# Patient Record
Sex: MALE | Race: WHITE | Employment: OTHER | URBAN - NONMETROPOLITAN AREA
[De-identification: names, ages, dates, MRNs, and addresses within clinical notes are randomized per-mention and may not be internally consistent; named-entity substitution may affect disease eponyms.]

---

## 2017-10-31 LAB
ALBUMIN SERPL-MCNC: 4.2 G/DL
ALP BLD-CCNC: 131 U/L
ALT SERPL-CCNC: 26 U/L
ANION GAP SERPL CALCULATED.3IONS-SCNC: NORMAL MMOL/L
AST SERPL-CCNC: 19 U/L
AVERAGE GLUCOSE: NORMAL
BASOPHILS ABSOLUTE: NORMAL
BASOPHILS RELATIVE PERCENT: NORMAL
BILIRUB SERPL-MCNC: 0.5 MG/DL (ref 0.1–1.4)
BUN BLDV-MCNC: 28 MG/DL
CALCIUM SERPL-MCNC: 9.9 MG/DL
CHLORIDE BLD-SCNC: 94 MMOL/L
CHOLESTEROL, TOTAL: 348 MG/DL
CHOLESTEROL/HDL RATIO: ABNORMAL
CO2: 19 MMOL/L
CREAT SERPL-MCNC: 1.8 MG/DL
CREATININE, URINE: 51.7
EOSINOPHILS ABSOLUTE: NORMAL
EOSINOPHILS RELATIVE PERCENT: NORMAL
GFR CALCULATED: 40
GLUCOSE BLD-MCNC: 475 MG/DL
HBA1C MFR BLD: 10.8 %
HCT VFR BLD CALC: 42.5 % (ref 41–53)
HDLC SERPL-MCNC: 43 MG/DL (ref 35–70)
HEMOGLOBIN: 14.4 G/DL (ref 13.5–17.5)
LDL CHOLESTEROL CALCULATED: 209 MG/DL (ref 0–160)
LYMPHOCYTES ABSOLUTE: NORMAL
LYMPHOCYTES RELATIVE PERCENT: NORMAL
MCH RBC QN AUTO: NORMAL PG
MCHC RBC AUTO-ENTMCNC: NORMAL G/DL
MCV RBC AUTO: NORMAL FL
MICROALBUMIN/CREAT 24H UR: 53.7 MG/G{CREAT}
MICROALBUMIN/CREAT UR-RTO: NORMAL
MONOCYTES ABSOLUTE: NORMAL
MONOCYTES RELATIVE PERCENT: NORMAL
NEUTROPHILS ABSOLUTE: NORMAL
NEUTROPHILS RELATIVE PERCENT: NORMAL
NONHDLC SERPL-MCNC: ABNORMAL MG/DL
PLATELET # BLD: 145 K/ΜL
PMV BLD AUTO: NORMAL FL
POTASSIUM SERPL-SCNC: 5.1 MMOL/L
PROSTATE SPECIFIC ANTIGEN: 0.13 NG/ML
RBC # BLD: 4.43 10^6/ΜL
SODIUM BLD-SCNC: 134 MMOL/L
TOTAL PROTEIN: 7.8
TRIGL SERPL-MCNC: 484 MG/DL
VLDLC SERPL CALC-MCNC: 97 MG/DL
WBC # BLD: 4.5 10^3/ML

## 2018-06-05 LAB
ALBUMIN SERPL-MCNC: 4.5 G/DL
ALP BLD-CCNC: 129 U/L
ALT SERPL-CCNC: 23 U/L
ANION GAP SERPL CALCULATED.3IONS-SCNC: NORMAL MMOL/L
AST SERPL-CCNC: 23 U/L
AVERAGE GLUCOSE: NORMAL
BILIRUB SERPL-MCNC: 0.3 MG/DL (ref 0.1–1.4)
BUN BLDV-MCNC: 24 MG/DL
CALCIUM SERPL-MCNC: 9.5 MG/DL
CHLORIDE BLD-SCNC: 96 MMOL/L
CHOLESTEROL, TOTAL: 324 MG/DL
CHOLESTEROL/HDL RATIO: ABNORMAL
CO2: 24 MMOL/L
CREAT SERPL-MCNC: 1.6 MG/DL
CREATININE, URINE: 93.7
GFR CALCULATED: 46
GLUCOSE BLD-MCNC: 419 MG/DL
HBA1C MFR BLD: 11.9 %
HDLC SERPL-MCNC: 60 MG/DL (ref 35–70)
LDL CHOLESTEROL CALCULATED: 214 MG/DL (ref 0–160)
MICROALBUMIN/CREAT 24H UR: 129 MG/G{CREAT}
MICROALBUMIN/CREAT UR-RTO: NORMAL
NONHDLC SERPL-MCNC: ABNORMAL MG/DL
POTASSIUM SERPL-SCNC: 4.5 MMOL/L
SODIUM BLD-SCNC: 132 MMOL/L
TOTAL PROTEIN: 7.7
TRIGL SERPL-MCNC: 253 MG/DL
VLDLC SERPL CALC-MCNC: 51 MG/DL

## 2019-01-25 LAB
BASOPHILS ABSOLUTE: ABNORMAL
BASOPHILS RELATIVE PERCENT: ABNORMAL
BUN BLDV-MCNC: 16 MG/DL
CALCIUM SERPL-MCNC: 8.9 MG/DL
CHLORIDE BLD-SCNC: 104 MMOL/L
CO2: 27 MMOL/L
CREAT SERPL-MCNC: 1.2 MG/DL
EOSINOPHILS ABSOLUTE: ABNORMAL
EOSINOPHILS RELATIVE PERCENT: ABNORMAL
GFR CALCULATED: 64
GLUCOSE BLD-MCNC: 202 MG/DL
HCT VFR BLD CALC: 37.8 % (ref 41–53)
HEMOGLOBIN: 12.5 G/DL (ref 13.5–17.5)
LYMPHOCYTES ABSOLUTE: ABNORMAL
LYMPHOCYTES RELATIVE PERCENT: ABNORMAL
MCH RBC QN AUTO: ABNORMAL PG
MCHC RBC AUTO-ENTMCNC: ABNORMAL G/DL
MCV RBC AUTO: ABNORMAL FL
MONOCYTES ABSOLUTE: ABNORMAL
MONOCYTES RELATIVE PERCENT: ABNORMAL
NEUTROPHILS ABSOLUTE: ABNORMAL
NEUTROPHILS RELATIVE PERCENT: ABNORMAL
PLATELET # BLD: 155 K/ΜL
PMV BLD AUTO: ABNORMAL FL
POTASSIUM SERPL-SCNC: 4.9 MMOL/L
RBC # BLD: 3.83 10^6/ΜL
SODIUM BLD-SCNC: 138 MMOL/L
WBC # BLD: 3.84 10^3/ML

## 2019-03-04 LAB
ALBUMIN SERPL-MCNC: 4.6 G/DL
ALP BLD-CCNC: 155 U/L
ALT SERPL-CCNC: 25 U/L
ANION GAP SERPL CALCULATED.3IONS-SCNC: NORMAL MMOL/L
AST SERPL-CCNC: 15 U/L
AVERAGE GLUCOSE: NORMAL
BILIRUB SERPL-MCNC: 0.8 MG/DL (ref 0.1–1.4)
BUN BLDV-MCNC: 32 MG/DL
CALCIUM SERPL-MCNC: 10.3 MG/DL
CHLORIDE BLD-SCNC: 88 MMOL/L
CHOLESTEROL, TOTAL: 345 MG/DL
CHOLESTEROL/HDL RATIO: ABNORMAL
CO2: 23 MMOL/L
CREAT SERPL-MCNC: 1.5 MG/DL
FOLATE: 8.5
GFR CALCULATED: 50
GLUCOSE BLD-MCNC: 605 MG/DL
HBA1C MFR BLD: 12.1 %
HDLC SERPL-MCNC: 50 MG/DL (ref 35–70)
LDL CHOLESTEROL CALCULATED: 198 MG/DL (ref 0–160)
NONHDLC SERPL-MCNC: ABNORMAL MG/DL
POTASSIUM SERPL-SCNC: 4.5 MMOL/L
SODIUM BLD-SCNC: 127 MMOL/L
TOTAL PROTEIN: 8.6
TRIGL SERPL-MCNC: 485 MG/DL
VITAMIN B-12: 892
VLDLC SERPL CALC-MCNC: 97 MG/DL

## 2019-06-11 LAB
ALBUMIN SERPL-MCNC: 4.2 G/DL
ALP BLD-CCNC: 149 U/L
ALT SERPL-CCNC: 29 U/L
ANION GAP SERPL CALCULATED.3IONS-SCNC: NORMAL MMOL/L
AST SERPL-CCNC: 16 U/L
AVERAGE GLUCOSE: NORMAL
BASOPHILS ABSOLUTE: ABNORMAL
BASOPHILS RELATIVE PERCENT: ABNORMAL
BILIRUB SERPL-MCNC: 0.4 MG/DL (ref 0.1–1.4)
BUN BLDV-MCNC: 32 MG/DL
CALCIUM SERPL-MCNC: 10.5 MG/DL
CHLORIDE BLD-SCNC: 91 MMOL/L
CHOLESTEROL, TOTAL: 194 MG/DL
CHOLESTEROL/HDL RATIO: NORMAL
CO2: 26 MMOL/L
CREAT SERPL-MCNC: 1.3 MG/DL
CREATININE, URINE: 109.4
EOSINOPHILS ABSOLUTE: ABNORMAL
EOSINOPHILS RELATIVE PERCENT: ABNORMAL
GFR CALCULATED: 59
GLUCOSE BLD-MCNC: 375 MG/DL
HBA1C MFR BLD: 12.3 %
HCT VFR BLD CALC: 39.1 % (ref 41–53)
HDLC SERPL-MCNC: 58 MG/DL (ref 35–70)
HEMOGLOBIN: 13 G/DL (ref 13.5–17.5)
LDL CHOLESTEROL CALCULATED: 99 MG/DL (ref 0–160)
LYMPHOCYTES ABSOLUTE: ABNORMAL
LYMPHOCYTES RELATIVE PERCENT: ABNORMAL
MCH RBC QN AUTO: ABNORMAL PG
MCHC RBC AUTO-ENTMCNC: ABNORMAL G/DL
MCV RBC AUTO: ABNORMAL FL
MICROALBUMIN/CREAT 24H UR: 172.7 MG/G{CREAT}
MICROALBUMIN/CREAT UR-RTO: NORMAL
MONOCYTES ABSOLUTE: ABNORMAL
MONOCYTES RELATIVE PERCENT: ABNORMAL
NEUTROPHILS ABSOLUTE: ABNORMAL
NEUTROPHILS RELATIVE PERCENT: ABNORMAL
NONHDLC SERPL-MCNC: NORMAL MG/DL
PLATELET # BLD: 150 K/ΜL
PMV BLD AUTO: ABNORMAL FL
POTASSIUM SERPL-SCNC: 5 MMOL/L
RBC # BLD: 4.05 10^6/ΜL
SODIUM BLD-SCNC: 128 MMOL/L
TOTAL PROTEIN: 7.8
TRIGL SERPL-MCNC: 186 MG/DL
VLDLC SERPL CALC-MCNC: 37 MG/DL
WBC # BLD: 4.6 10^3/ML

## 2019-07-05 LAB
BUN BLDV-MCNC: 22 MG/DL
CALCIUM SERPL-MCNC: 10.1 MG/DL
CHLORIDE BLD-SCNC: 93 MMOL/L
CO2: 21 MMOL/L
CREAT SERPL-MCNC: 1.4 MG/DL
GFR CALCULATED: 54
GLUCOSE BLD-MCNC: 520 MG/DL
POTASSIUM SERPL-SCNC: 5.3 MMOL/L
SODIUM BLD-SCNC: 127 MMOL/L

## 2019-07-09 LAB
BUN BLDV-MCNC: 30 MG/DL
CALCIUM SERPL-MCNC: 9.7 MG/DL
CHLORIDE BLD-SCNC: 91 MMOL/L
CO2: 19 MMOL/L
CREAT SERPL-MCNC: 1.4 MG/DL
GFR CALCULATED: 54
GLUCOSE BLD-MCNC: 518 MG/DL
POTASSIUM SERPL-SCNC: 4.8 MMOL/L
SODIUM BLD-SCNC: 127 MMOL/L
TSH SERPL DL<=0.05 MIU/L-ACNC: 3.85 UIU/ML

## 2019-09-09 LAB
ALBUMIN SERPL-MCNC: 4.2 G/DL
ALP BLD-CCNC: 147 U/L
ALT SERPL-CCNC: 22 U/L
ANION GAP SERPL CALCULATED.3IONS-SCNC: NORMAL MMOL/L
AST SERPL-CCNC: 21 U/L
AVERAGE GLUCOSE: NORMAL
BASOPHILS ABSOLUTE: ABNORMAL
BASOPHILS RELATIVE PERCENT: ABNORMAL
BILIRUB SERPL-MCNC: 0.3 MG/DL (ref 0.1–1.4)
BUN BLDV-MCNC: 27 MG/DL
CALCIUM SERPL-MCNC: 10.1 MG/DL
CHLORIDE BLD-SCNC: 99 MMOL/L
CHOLESTEROL, TOTAL: 185 MG/DL
CHOLESTEROL/HDL RATIO: NORMAL
CO2: 23 MMOL/L
CREAT SERPL-MCNC: 1.4 MG/DL
CREATININE, URINE: 54.9
EOSINOPHILS ABSOLUTE: ABNORMAL
EOSINOPHILS RELATIVE PERCENT: ABNORMAL
GFR CALCULATED: 54
GLUCOSE BLD-MCNC: 367 MG/DL
HBA1C MFR BLD: 11.7 %
HCT VFR BLD CALC: 39.4 % (ref 41–53)
HDLC SERPL-MCNC: 55 MG/DL (ref 35–70)
HEMOGLOBIN: 13.3 G/DL (ref 13.5–17.5)
LDL CHOLESTEROL CALCULATED: 103 MG/DL (ref 0–160)
LYMPHOCYTES ABSOLUTE: ABNORMAL
LYMPHOCYTES RELATIVE PERCENT: ABNORMAL
MCH RBC QN AUTO: ABNORMAL PG
MCHC RBC AUTO-ENTMCNC: ABNORMAL G/DL
MCV RBC AUTO: ABNORMAL FL
MICROALBUMIN/CREAT 24H UR: 130.3 MG/G{CREAT}
MICROALBUMIN/CREAT UR-RTO: NORMAL
MONOCYTES ABSOLUTE: ABNORMAL
MONOCYTES RELATIVE PERCENT: ABNORMAL
NEUTROPHILS ABSOLUTE: ABNORMAL
NEUTROPHILS RELATIVE PERCENT: ABNORMAL
NONHDLC SERPL-MCNC: NORMAL MG/DL
PLATELET # BLD: 175 K/ΜL
PMV BLD AUTO: ABNORMAL FL
POTASSIUM SERPL-SCNC: 4.6 MMOL/L
RBC # BLD: 4.09 10^6/ΜL
SODIUM BLD-SCNC: 136 MMOL/L
TOTAL PROTEIN: 7.9
TRIGL SERPL-MCNC: 135 MG/DL
VLDLC SERPL CALC-MCNC: 27 MG/DL
WBC # BLD: 3.81 10^3/ML

## 2019-12-11 LAB
ALBUMIN SERPL-MCNC: 3.9 G/DL
ALP BLD-CCNC: 93 U/L
ALT SERPL-CCNC: 46 U/L
ANION GAP SERPL CALCULATED.3IONS-SCNC: NORMAL MMOL/L
AST SERPL-CCNC: 28 U/L
AVERAGE GLUCOSE: NORMAL
BILIRUB SERPL-MCNC: 0.4 MG/DL (ref 0.1–1.4)
BUN BLDV-MCNC: 15 MG/DL
CALCIUM SERPL-MCNC: 9.5 MG/DL
CHLORIDE BLD-SCNC: 100 MMOL/L
CO2: 25 MMOL/L
CREAT SERPL-MCNC: 1.1 MG/DL
CREATININE, URINE: 207.4
GFR CALCULATED: 71
GLUCOSE BLD-MCNC: 178 MG/DL
HBA1C MFR BLD: 11.8 %
MICROALBUMIN/CREAT 24H UR: 733.8 MG/G{CREAT}
MICROALBUMIN/CREAT UR-RTO: NORMAL
POTASSIUM SERPL-SCNC: 4.3 MMOL/L
SODIUM BLD-SCNC: 137 MMOL/L
TOTAL PROTEIN: 7.2

## 2020-03-12 LAB
ALBUMIN SERPL-MCNC: 3.9 G/DL
ALP BLD-CCNC: 151 U/L
ALT SERPL-CCNC: 23 U/L
ANION GAP SERPL CALCULATED.3IONS-SCNC: NORMAL MMOL/L
AST SERPL-CCNC: 14 U/L
AVERAGE GLUCOSE: NORMAL
BILIRUB SERPL-MCNC: 0.2 MG/DL (ref 0.1–1.4)
BUN BLDV-MCNC: 27 MG/DL
CALCIUM SERPL-MCNC: 9.5 MG/DL
CHLORIDE BLD-SCNC: 101 MMOL/L
CHOLESTEROL, TOTAL: 282 MG/DL
CHOLESTEROL/HDL RATIO: NORMAL
CO2: 25 MMOL/L
CREAT SERPL-MCNC: 1.3 MG/DL
GFR CALCULATED: 58
GLUCOSE BLD-MCNC: 282 MG/DL
HBA1C MFR BLD: 14 %
HDLC SERPL-MCNC: 47 MG/DL (ref 35–70)
LDL CHOLESTEROL CALCULATED: 142 MG/DL (ref 0–160)
NONHDLC SERPL-MCNC: NORMAL MG/DL
POTASSIUM SERPL-SCNC: 4.4 MMOL/L
SODIUM BLD-SCNC: 136 MMOL/L
TOTAL PROTEIN: 7
TRIGL SERPL-MCNC: 466 MG/DL
VLDLC SERPL CALC-MCNC: 93 MG/DL

## 2020-06-08 LAB
ALBUMIN SERPL-MCNC: 4.2 G/DL
ALP BLD-CCNC: 111 U/L
ALT SERPL-CCNC: 26 U/L
ANION GAP SERPL CALCULATED.3IONS-SCNC: NORMAL MMOL/L
AST SERPL-CCNC: 24 U/L
AVERAGE GLUCOSE: NORMAL
BILIRUB SERPL-MCNC: 0.3 MG/DL (ref 0.1–1.4)
BUN BLDV-MCNC: 28 MG/DL
CALCIUM SERPL-MCNC: 10 MG/DL
CHLORIDE BLD-SCNC: 96 MMOL/L
CO2: 26 MMOL/L
CREAT SERPL-MCNC: 1.8 MG/DL
GFR CALCULATED: 40
GLUCOSE BLD-MCNC: 236 MG/DL
HBA1C MFR BLD: 12.3 %
POTASSIUM SERPL-SCNC: 4.4 MMOL/L
SODIUM BLD-SCNC: 134 MMOL/L
TOTAL PROTEIN: 7.4

## 2020-08-18 LAB
ALBUMIN SERPL-MCNC: 4 G/DL
ALP BLD-CCNC: 120 U/L
ALT SERPL-CCNC: 18 U/L
ANION GAP SERPL CALCULATED.3IONS-SCNC: NORMAL MMOL/L
AST SERPL-CCNC: 13 U/L
BASOPHILS ABSOLUTE: ABNORMAL
BASOPHILS RELATIVE PERCENT: ABNORMAL
BILIRUB SERPL-MCNC: 0.5 MG/DL (ref 0.1–1.4)
BILIRUBIN, URINE: POSITIVE
BLOOD, URINE: POSITIVE
BUN BLDV-MCNC: 41 MG/DL
CALCIUM SERPL-MCNC: 9.6 MG/DL
CHLORIDE BLD-SCNC: 91 MMOL/L
CLARITY: CLEAR
CO2: 14 MMOL/L
COLOR: YELLOW
CREAT SERPL-MCNC: 1.9 MG/DL
EOSINOPHILS ABSOLUTE: ABNORMAL
EOSINOPHILS RELATIVE PERCENT: ABNORMAL
GFR CALCULATED: 38
GLUCOSE BLD-MCNC: 496 MG/DL
GLUCOSE URINE: >=1000
HCT VFR BLD CALC: 37.5 % (ref 41–53)
HEMOGLOBIN: 12.3 G/DL (ref 13.5–17.5)
KETONES, URINE: POSITIVE
LEUKOCYTE ESTERASE, URINE: POSITIVE
LYMPHOCYTES ABSOLUTE: ABNORMAL
LYMPHOCYTES RELATIVE PERCENT: ABNORMAL
MCH RBC QN AUTO: ABNORMAL PG
MCHC RBC AUTO-ENTMCNC: ABNORMAL G/DL
MCV RBC AUTO: ABNORMAL FL
MONOCYTES ABSOLUTE: ABNORMAL
MONOCYTES RELATIVE PERCENT: ABNORMAL
NEUTROPHILS ABSOLUTE: ABNORMAL
NEUTROPHILS RELATIVE PERCENT: ABNORMAL
NITRITE, URINE: NEGATIVE
PH UA: 5.5 (ref 4.5–8)
PLATELET # BLD: 270 K/ΜL
PMV BLD AUTO: ABNORMAL FL
POTASSIUM SERPL-SCNC: 5.5 MMOL/L
PROTEIN UA: ABNORMAL
RBC # BLD: 3.92 10^6/ΜL
SODIUM BLD-SCNC: 128 MMOL/L
SPECIFIC GRAVITY, URINE: 1.02
TOTAL PROTEIN: 7.9
UROBILINOGEN, URINE: NORMAL
WBC # BLD: 9.34 10^3/ML

## 2020-08-21 LAB
ALBUMIN SERPL-MCNC: 3.3 G/DL
ALP BLD-CCNC: 105 U/L
ALT SERPL-CCNC: 16 U/L
ANION GAP SERPL CALCULATED.3IONS-SCNC: NORMAL MMOL/L
AST SERPL-CCNC: 16 U/L
BASOPHILS ABSOLUTE: ABNORMAL
BASOPHILS RELATIVE PERCENT: ABNORMAL
BILIRUB SERPL-MCNC: 0.3 MG/DL (ref 0.1–1.4)
BUN BLDV-MCNC: 27 MG/DL
CALCIUM SERPL-MCNC: 9.5 MG/DL
CHLORIDE BLD-SCNC: 99 MMOL/L
CO2: 22 MMOL/L
CREAT SERPL-MCNC: 1.3 MG/DL
EOSINOPHILS ABSOLUTE: ABNORMAL
EOSINOPHILS RELATIVE PERCENT: ABNORMAL
GFR CALCULATED: 58
GLUCOSE BLD-MCNC: 172 MG/DL
HCT VFR BLD CALC: 31.9 % (ref 41–53)
HEMOGLOBIN: 10.5 G/DL (ref 13.5–17.5)
LYMPHOCYTES ABSOLUTE: ABNORMAL
LYMPHOCYTES RELATIVE PERCENT: ABNORMAL
MCH RBC QN AUTO: ABNORMAL PG
MCHC RBC AUTO-ENTMCNC: ABNORMAL G/DL
MCV RBC AUTO: ABNORMAL FL
MONOCYTES ABSOLUTE: ABNORMAL
MONOCYTES RELATIVE PERCENT: ABNORMAL
NEUTROPHILS ABSOLUTE: ABNORMAL
NEUTROPHILS RELATIVE PERCENT: ABNORMAL
PLATELET # BLD: 175 K/ΜL
PMV BLD AUTO: ABNORMAL FL
POTASSIUM SERPL-SCNC: 4.3 MMOL/L
RBC # BLD: 3.37 10^6/ΜL
SODIUM BLD-SCNC: 133 MMOL/L
TOTAL PROTEIN: 7
WBC # BLD: 7.8 10^3/ML

## 2020-12-07 LAB
ALBUMIN SERPL-MCNC: 4.1 G/DL
ALP BLD-CCNC: 72 U/L
ALT SERPL-CCNC: 19 U/L
ANION GAP SERPL CALCULATED.3IONS-SCNC: NORMAL MMOL/L
AST SERPL-CCNC: 19 U/L
AVERAGE GLUCOSE: NORMAL
BASOPHILS ABSOLUTE: ABNORMAL
BASOPHILS RELATIVE PERCENT: ABNORMAL
BILIRUB SERPL-MCNC: 0.4 MG/DL (ref 0.1–1.4)
BUN BLDV-MCNC: 19 MG/DL
C-PEPTIDE: 0.3
CALCIUM SERPL-MCNC: 9.3 MG/DL
CHLORIDE BLD-SCNC: 99 MMOL/L
CHOLESTEROL, TOTAL: 188 MG/DL
CHOLESTEROL/HDL RATIO: NORMAL
CO2: 26 MMOL/L
CREAT SERPL-MCNC: 1.4 MG/DL
CREATININE, URINE: 180.3
EOSINOPHILS ABSOLUTE: ABNORMAL
EOSINOPHILS RELATIVE PERCENT: ABNORMAL
FOLATE: 16.5
GFR CALCULATED: 54
GLUCOSE BLD-MCNC: 103 MG/DL
HBA1C MFR BLD: 9.4 %
HCT VFR BLD CALC: 38.4 % (ref 41–53)
HDLC SERPL-MCNC: 51 MG/DL (ref 35–70)
HEMOGLOBIN: 12.6 G/DL (ref 13.5–17.5)
LDL CHOLESTEROL CALCULATED: 111 MG/DL (ref 0–160)
LYMPHOCYTES ABSOLUTE: ABNORMAL
LYMPHOCYTES RELATIVE PERCENT: ABNORMAL
MCH RBC QN AUTO: ABNORMAL PG
MCHC RBC AUTO-ENTMCNC: ABNORMAL G/DL
MCV RBC AUTO: ABNORMAL FL
MICROALBUMIN/CREAT 24H UR: 198.1 MG/G{CREAT}
MICROALBUMIN/CREAT UR-RTO: NORMAL
MONOCYTES ABSOLUTE: ABNORMAL
MONOCYTES RELATIVE PERCENT: ABNORMAL
NEUTROPHILS ABSOLUTE: ABNORMAL
NEUTROPHILS RELATIVE PERCENT: ABNORMAL
NONHDLC SERPL-MCNC: NORMAL MG/DL
PLATELET # BLD: 129 K/ΜL
PMV BLD AUTO: ABNORMAL FL
POTASSIUM SERPL-SCNC: 4.3 MMOL/L
RBC # BLD: 4.08 10^6/ΜL
SODIUM BLD-SCNC: 137 MMOL/L
TOTAL PROTEIN: 7.3
TRIGL SERPL-MCNC: 130 MG/DL
VITAMIN B-12: 434
VLDLC SERPL CALC-MCNC: 26 MG/DL
WBC # BLD: 6.67 10^3/ML

## 2021-03-16 LAB
ALBUMIN SERPL-MCNC: 4.2 G/DL
ALP BLD-CCNC: 101 U/L
ALT SERPL-CCNC: 21 U/L
ANION GAP SERPL CALCULATED.3IONS-SCNC: NORMAL MMOL/L
AST SERPL-CCNC: 16 U/L
AVERAGE GLUCOSE: NORMAL
BILIRUB SERPL-MCNC: 0.6 MG/DL (ref 0.1–1.4)
BUN BLDV-MCNC: 32 MG/DL
CALCIUM SERPL-MCNC: 9.9 MG/DL
CHLORIDE BLD-SCNC: 98 MMOL/L
CHOLESTEROL, TOTAL: 199 MG/DL
CHOLESTEROL/HDL RATIO: NORMAL
CO2: 24 MMOL/L
CREAT SERPL-MCNC: 1.6 MG/DL
GFR CALCULATED: 46
GLUCOSE BLD-MCNC: 339 MG/DL
HBA1C MFR BLD: 8.3 %
HDLC SERPL-MCNC: 52 MG/DL (ref 35–70)
LDL CHOLESTEROL CALCULATED: 94 MG/DL (ref 0–160)
NONHDLC SERPL-MCNC: NORMAL MG/DL
POTASSIUM SERPL-SCNC: 4.9 MMOL/L
SODIUM BLD-SCNC: 137 MMOL/L
TOTAL PROTEIN: 7.8
TRIGL SERPL-MCNC: 266 MG/DL
VLDLC SERPL CALC-MCNC: 53 MG/DL

## 2021-03-19 LAB
CREATININE, URINE: 126.3
MICROALBUMIN/CREAT 24H UR: 179.5 MG/G{CREAT}
MICROALBUMIN/CREAT UR-RTO: NORMAL

## 2021-06-16 LAB
ALBUMIN SERPL-MCNC: 4 G/DL
ALP BLD-CCNC: 83 U/L
ALT SERPL-CCNC: 37 U/L
ANION GAP SERPL CALCULATED.3IONS-SCNC: NORMAL MMOL/L
AST SERPL-CCNC: 23 U/L
AVERAGE GLUCOSE: NORMAL
BASOPHILS ABSOLUTE: NORMAL
BASOPHILS RELATIVE PERCENT: NORMAL
BILIRUB SERPL-MCNC: 0.3 MG/DL (ref 0.1–1.4)
BUN BLDV-MCNC: 21 MG/DL
CALCIUM SERPL-MCNC: 9 MG/DL
CHLORIDE BLD-SCNC: 106 MMOL/L
CHOLESTEROL, TOTAL: 154 MG/DL
CHOLESTEROL/HDL RATIO: NORMAL
CO2: 28 MMOL/L
CREAT SERPL-MCNC: 1.1 MG/DL
EOSINOPHILS ABSOLUTE: NORMAL
EOSINOPHILS RELATIVE PERCENT: NORMAL
GFR CALCULATED: 71
GLUCOSE BLD-MCNC: 66 MG/DL
HBA1C MFR BLD: 7.8 %
HCT VFR BLD CALC: 42.2 % (ref 41–53)
HDLC SERPL-MCNC: 52 MG/DL (ref 35–70)
HEMOGLOBIN: 13.8 G/DL (ref 13.5–17.5)
LDL CHOLESTEROL CALCULATED: 83 MG/DL (ref 0–160)
LYMPHOCYTES ABSOLUTE: NORMAL
LYMPHOCYTES RELATIVE PERCENT: NORMAL
MCH RBC QN AUTO: NORMAL PG
MCHC RBC AUTO-ENTMCNC: NORMAL G/DL
MCV RBC AUTO: NORMAL FL
MONOCYTES ABSOLUTE: NORMAL
MONOCYTES RELATIVE PERCENT: NORMAL
NEUTROPHILS ABSOLUTE: NORMAL
NEUTROPHILS RELATIVE PERCENT: NORMAL
NONHDLC SERPL-MCNC: NORMAL MG/DL
PLATELET # BLD: 141 K/ΜL
PMV BLD AUTO: NORMAL FL
POTASSIUM SERPL-SCNC: 4.4 MMOL/L
RBC # BLD: 4.33 10^6/ΜL
SODIUM BLD-SCNC: 144 MMOL/L
TOTAL PROTEIN: 7.1
TRIGL SERPL-MCNC: 94 MG/DL
VLDLC SERPL CALC-MCNC: 19 MG/DL
WBC # BLD: 5.13 10^3/ML

## 2021-09-13 LAB
ALBUMIN SERPL-MCNC: 4.3 G/DL
ALP BLD-CCNC: 106 U/L
ALT SERPL-CCNC: 41 U/L
ANION GAP SERPL CALCULATED.3IONS-SCNC: 14 MMOL/L
AST SERPL-CCNC: 33 U/L
BASOPHILS ABSOLUTE: ABNORMAL
BASOPHILS RELATIVE PERCENT: ABNORMAL
BILIRUB SERPL-MCNC: 0.3 MG/DL (ref 0.1–1.4)
BILIRUBIN, URINE: NEGATIVE
BLOOD, URINE: NEGATIVE
BUN BLDV-MCNC: 26 MG/DL
CALCIUM SERPL-MCNC: 9 MG/DL
CHLORIDE BLD-SCNC: 100 MMOL/L
CHOLESTEROL, TOTAL: 157 MG/DL
CHOLESTEROL/HDL RATIO: NORMAL
CLARITY: CLEAR
CO2: 26 MMOL/L
COLOR: YELLOW
CREAT SERPL-MCNC: 1.3 MG/DL
EOSINOPHILS ABSOLUTE: ABNORMAL
EOSINOPHILS RELATIVE PERCENT: ABNORMAL
GFR CALCULATED: 58
GLUCOSE BLD-MCNC: 220 MG/DL
GLUCOSE URINE: NORMAL
HCT VFR BLD CALC: 38.2 % (ref 41–53)
HDLC SERPL-MCNC: 48 MG/DL (ref 35–70)
HEMOGLOBIN: 12.9 G/DL (ref 13.5–17.5)
KETONES, URINE: NORMAL
LDL CHOLESTEROL CALCULATED: 71 MG/DL (ref 0–160)
LEUKOCYTE ESTERASE, URINE: NEGATIVE
LYMPHOCYTES ABSOLUTE: ABNORMAL
LYMPHOCYTES RELATIVE PERCENT: ABNORMAL
MCH RBC QN AUTO: ABNORMAL PG
MCHC RBC AUTO-ENTMCNC: ABNORMAL G/DL
MCV RBC AUTO: ABNORMAL FL
MONOCYTES ABSOLUTE: ABNORMAL
MONOCYTES RELATIVE PERCENT: ABNORMAL
NEUTROPHILS ABSOLUTE: ABNORMAL
NEUTROPHILS RELATIVE PERCENT: ABNORMAL
NITRITE, URINE: NEGATIVE
NONHDLC SERPL-MCNC: NORMAL MG/DL
PH UA: 6 (ref 4.5–8)
PLATELET # BLD: 141 K/ΜL
PMV BLD AUTO: ABNORMAL FL
POTASSIUM SERPL-SCNC: 4.2 MMOL/L
PROTEIN UA: NORMAL
RBC # BLD: 3.93 10^6/ΜL
SODIUM BLD-SCNC: 136 MMOL/L
SPECIFIC GRAVITY, URINE: 1.02
TOTAL PROTEIN: 6.9
TRIGL SERPL-MCNC: 191 MG/DL
UROBILINOGEN, URINE: NORMAL
VLDLC SERPL CALC-MCNC: 38 MG/DL
WBC # BLD: 5.78 10^3/ML

## 2021-11-15 LAB
ALBUMIN SERPL-MCNC: 4.3 G/DL
ALP BLD-CCNC: 108 U/L
ALT SERPL-CCNC: 87 U/L
ANION GAP SERPL CALCULATED.3IONS-SCNC: NORMAL MMOL/L
AST SERPL-CCNC: 46 U/L
AVERAGE GLUCOSE: NORMAL
BASOPHILS ABSOLUTE: NORMAL
BASOPHILS RELATIVE PERCENT: NORMAL
BILIRUB SERPL-MCNC: 0.4 MG/DL (ref 0.1–1.4)
BUN BLDV-MCNC: 27 MG/DL
CALCIUM SERPL-MCNC: 9.9 MG/DL
CHLORIDE BLD-SCNC: 100 MMOL/L
CHOLESTEROL, TOTAL: 211 MG/DL
CHOLESTEROL/HDL RATIO: NORMAL
CO2: 26 MMOL/L
CREAT SERPL-MCNC: 1.4 MG/DL
CREATININE, URINE: 120.9
EOSINOPHILS ABSOLUTE: NORMAL
EOSINOPHILS RELATIVE PERCENT: NORMAL
GFR CALCULATED: 53
GLUCOSE BLD-MCNC: 328 MG/DL
HBA1C MFR BLD: 10.2 %
HCT VFR BLD CALC: 41.4 % (ref 41–53)
HDLC SERPL-MCNC: 58 MG/DL (ref 35–70)
HEMOGLOBIN: 13.8 G/DL (ref 13.5–17.5)
LDL CHOLESTEROL CALCULATED: 131 MG/DL (ref 0–160)
LYMPHOCYTES ABSOLUTE: NORMAL
LYMPHOCYTES RELATIVE PERCENT: NORMAL
MCH RBC QN AUTO: NORMAL PG
MCHC RBC AUTO-ENTMCNC: NORMAL G/DL
MCV RBC AUTO: NORMAL FL
MICROALBUMIN/CREAT 24H UR: 784.9 MG/G{CREAT}
MICROALBUMIN/CREAT UR-RTO: NORMAL
MONOCYTES ABSOLUTE: NORMAL
MONOCYTES RELATIVE PERCENT: NORMAL
NEUTROPHILS ABSOLUTE: NORMAL
NEUTROPHILS RELATIVE PERCENT: NORMAL
NONHDLC SERPL-MCNC: NORMAL MG/DL
PLATELET # BLD: 150 K/ΜL
PMV BLD AUTO: NORMAL FL
POTASSIUM SERPL-SCNC: 5 MMOL/L
RBC # BLD: 4.2 10^6/ΜL
SODIUM BLD-SCNC: 136 MMOL/L
TOTAL PROTEIN: 7.6
TRIGL SERPL-MCNC: 110 MG/DL
VLDLC SERPL CALC-MCNC: 22 MG/DL
WBC # BLD: 5.36 10^3/ML

## 2021-12-14 LAB
ALBUMIN SERPL-MCNC: 4.2 G/DL
ALP BLD-CCNC: 116 U/L
ALT SERPL-CCNC: 43 U/L
ANION GAP SERPL CALCULATED.3IONS-SCNC: NORMAL MMOL/L
AST SERPL-CCNC: 28 U/L
BILIRUB SERPL-MCNC: 0.3 MG/DL (ref 0.1–1.4)
BUN BLDV-MCNC: 22 MG/DL
CALCIUM SERPL-MCNC: 9.1 MG/DL
CHLORIDE BLD-SCNC: 103 MMOL/L
CO2: 27 MMOL/L
CREAT SERPL-MCNC: 1.1 MG/DL
GFR CALCULATED: 71
GLUCOSE BLD-MCNC: 164 MG/DL
POTASSIUM SERPL-SCNC: 4.2 MMOL/L
SODIUM BLD-SCNC: 138 MMOL/L
TOTAL PROTEIN: 6.9

## 2021-12-16 PROBLEM — M47.812 SPONDYLOSIS OF CERVICAL SPINE: Status: ACTIVE | Noted: 2021-12-16

## 2021-12-16 PROBLEM — E11.21 DIABETIC NEPHROPATHY (HCC): Status: ACTIVE | Noted: 2021-12-16

## 2021-12-16 PROBLEM — N18.30 ANEMIA DUE TO STAGE 3 CHRONIC KIDNEY DISEASE (HCC): Status: ACTIVE | Noted: 2021-12-16

## 2021-12-16 PROBLEM — E66.3 OVERWEIGHT WITH BODY MASS INDEX (BMI) 25.0-29.9: Status: ACTIVE | Noted: 2021-12-16

## 2021-12-16 PROBLEM — D63.1 ANEMIA DUE TO STAGE 3 CHRONIC KIDNEY DISEASE (HCC): Status: ACTIVE | Noted: 2021-12-16

## 2021-12-16 PROBLEM — F41.8 MIXED ANXIETY DEPRESSIVE DISORDER: Status: ACTIVE | Noted: 2021-12-16

## 2021-12-16 PROBLEM — G89.4 CHRONIC PAIN SYNDROME: Status: ACTIVE | Noted: 2021-12-16

## 2021-12-16 PROBLEM — E11.9 DIABETES MELLITUS (HCC): Status: ACTIVE | Noted: 2021-12-16

## 2021-12-16 PROBLEM — I48.91 ATRIAL FIBRILLATION (HCC): Status: ACTIVE | Noted: 2021-12-16

## 2021-12-16 PROBLEM — R09.89 CAROTID BRUIT: Status: ACTIVE | Noted: 2021-12-16

## 2021-12-16 PROBLEM — I35.0 AORTIC VALVE STENOSIS: Status: ACTIVE | Noted: 2021-12-16

## 2021-12-16 PROBLEM — I10 BENIGN ESSENTIAL HYPERTENSION: Status: ACTIVE | Noted: 2021-12-16

## 2021-12-16 RX ORDER — INSULIN ASPART 100 [IU]/ML
8 INJECTION, SOLUTION INTRAVENOUS; SUBCUTANEOUS
COMMUNITY

## 2021-12-16 RX ORDER — ACETAMINOPHEN 500 MG
500 TABLET ORAL EVERY 6 HOURS PRN
COMMUNITY

## 2021-12-16 RX ORDER — INSULIN GLARGINE 100 [IU]/ML
22 INJECTION, SOLUTION SUBCUTANEOUS 2 TIMES DAILY
COMMUNITY

## 2021-12-16 RX ORDER — LISINOPRIL 20 MG/1
20 TABLET ORAL DAILY
COMMUNITY

## 2021-12-16 RX ORDER — M-VIT,TX,IRON,MINS/CALC/FOLIC 27MG-0.4MG
1 TABLET ORAL DAILY
COMMUNITY

## 2021-12-16 RX ORDER — LOVASTATIN 40 MG/1
40 TABLET ORAL NIGHTLY
COMMUNITY

## 2021-12-16 RX ORDER — GABAPENTIN 800 MG/1
800 TABLET ORAL 2 TIMES DAILY
COMMUNITY

## 2021-12-16 RX ORDER — MAGNESIUM OXIDE 400 MG/1
400 TABLET ORAL DAILY
COMMUNITY

## 2022-08-05 LAB
ALBUMIN SERPL-MCNC: 3.1 G/DL
ALP BLD-CCNC: 134 U/L
ALT SERPL-CCNC: 36 U/L
ANION GAP SERPL CALCULATED.3IONS-SCNC: 13 MMOL/L
AST SERPL-CCNC: 29 U/L
BASOPHILS ABSOLUTE: ABNORMAL
BASOPHILS RELATIVE PERCENT: ABNORMAL
BILIRUB SERPL-MCNC: 0.3 MG/DL (ref 0.1–1.4)
BUN BLDV-MCNC: 37 MG/DL
CALCIUM SERPL-MCNC: 9 MG/DL
CHLORIDE BLD-SCNC: 106 MMOL/L
CHOLESTEROL, TOTAL: 120 MG/DL
CHOLESTEROL/HDL RATIO: NORMAL
CO2: 25 MMOL/L
CREAT SERPL-MCNC: 2.3 MG/DL
CREATININE, URINE: 84.6
EOSINOPHILS ABSOLUTE: ABNORMAL
EOSINOPHILS RELATIVE PERCENT: ABNORMAL
GFR CALCULATED: 30
GLUCOSE BLD-MCNC: 89 MG/DL
HCT VFR BLD CALC: 29.5 % (ref 41–53)
HDLC SERPL-MCNC: 56 MG/DL (ref 35–70)
HEMOGLOBIN: 9.5 G/DL (ref 13.5–17.5)
LDL CHOLESTEROL CALCULATED: 53 MG/DL (ref 0–160)
LYMPHOCYTES ABSOLUTE: ABNORMAL
LYMPHOCYTES RELATIVE PERCENT: ABNORMAL
MCH RBC QN AUTO: ABNORMAL PG
MCHC RBC AUTO-ENTMCNC: ABNORMAL G/DL
MCV RBC AUTO: ABNORMAL FL
MICROALBUMIN/CREAT 24H UR: NORMAL MG/G{CREAT}
MICROALBUMIN/CREAT UR-RTO: 352
MONOCYTES ABSOLUTE: ABNORMAL
MONOCYTES RELATIVE PERCENT: ABNORMAL
NEUTROPHILS ABSOLUTE: ABNORMAL
NEUTROPHILS RELATIVE PERCENT: ABNORMAL
NONHDLC SERPL-MCNC: NORMAL MG/DL
PLATELET # BLD: 154 K/ΜL
PMV BLD AUTO: ABNORMAL FL
POTASSIUM SERPL-SCNC: 4.4 MMOL/L
RBC # BLD: 3.02 10^6/ΜL
SODIUM BLD-SCNC: 140 MMOL/L
TOTAL PROTEIN: 7.5
TRIGL SERPL-MCNC: 55 MG/DL
VLDLC SERPL CALC-MCNC: 11 MG/DL
WBC # BLD: 6.72 10^3/ML

## 2022-09-16 LAB
ALBUMIN SERPL-MCNC: 3.4 G/DL
ALP BLD-CCNC: 165 U/L
ALT SERPL-CCNC: 26 U/L
ANION GAP SERPL CALCULATED.3IONS-SCNC: 13 MMOL/L
AST SERPL-CCNC: 24 U/L
BASOPHILS ABSOLUTE: ABNORMAL
BASOPHILS RELATIVE PERCENT: ABNORMAL
BILIRUB SERPL-MCNC: NORMAL MG/DL
BUN BLDV-MCNC: 31 MG/DL
CALCIUM SERPL-MCNC: 9.2 MG/DL
CHLORIDE BLD-SCNC: 103 MMOL/L
CO2: 26 MMOL/L
CREAT SERPL-MCNC: 2.5 MG/DL
EOSINOPHILS ABSOLUTE: ABNORMAL
EOSINOPHILS RELATIVE PERCENT: ABNORMAL
GFR CALCULATED: 27
GLUCOSE BLD-MCNC: 327 MG/DL
HCT VFR BLD CALC: 30.2 % (ref 41–53)
HEMOGLOBIN: 10 G/DL (ref 13.5–17.5)
LYMPHOCYTES ABSOLUTE: ABNORMAL
LYMPHOCYTES RELATIVE PERCENT: ABNORMAL
MCH RBC QN AUTO: ABNORMAL PG
MCHC RBC AUTO-ENTMCNC: ABNORMAL G/DL
MCV RBC AUTO: ABNORMAL FL
MONOCYTES ABSOLUTE: ABNORMAL
MONOCYTES RELATIVE PERCENT: ABNORMAL
NEUTROPHILS ABSOLUTE: ABNORMAL
NEUTROPHILS RELATIVE PERCENT: ABNORMAL
PLATELET # BLD: 157 K/ΜL
PMV BLD AUTO: ABNORMAL FL
POTASSIUM SERPL-SCNC: 5.3 MMOL/L
RBC # BLD: 3.13 10^6/ΜL
SODIUM BLD-SCNC: 135 MMOL/L
TOTAL PROTEIN: 7.2
WBC # BLD: 6.64 10^3/ML

## 2022-09-19 PROBLEM — A41.9 SEPSIS (HCC): Status: ACTIVE | Noted: 2022-09-19

## 2022-09-19 PROBLEM — I10 ESSENTIAL HYPERTENSION, BENIGN: Status: ACTIVE | Noted: 2022-09-19

## 2022-09-19 PROBLEM — E11.3299 MILD NONPROLIFERATIVE DIABETIC RETINOPATHY ASSOCIATED WITH TYPE 2 DIABETES MELLITUS (HCC): Status: ACTIVE | Noted: 2021-09-02

## 2022-09-19 PROBLEM — M20.42 ACQUIRED HAMMER TOE OF LEFT FOOT: Status: ACTIVE | Noted: 2022-09-19

## 2022-09-19 PROBLEM — I35.0 AORTIC STENOSIS: Status: ACTIVE | Noted: 2022-09-19

## 2022-09-19 PROBLEM — R55 VASOVAGAL SYNCOPE: Status: ACTIVE | Noted: 2019-01-07

## 2022-09-19 PROBLEM — H04.123 DRY EYES: Status: ACTIVE | Noted: 2021-09-02

## 2022-09-19 PROBLEM — I30.9 ACUTE PERICARDIAL EFFUSION: Status: ACTIVE | Noted: 2022-09-19

## 2022-09-19 PROBLEM — R19.7 DIARRHEA: Status: ACTIVE | Noted: 2022-09-19

## 2022-09-19 PROBLEM — R74.8 ELEVATED LIVER ENZYMES: Status: ACTIVE | Noted: 2022-09-19

## 2022-09-19 PROBLEM — L97.521 ULCER OF LEFT FOOT, LIMITED TO BREAKDOWN OF SKIN (HCC): Status: ACTIVE | Noted: 2022-09-19

## 2022-09-19 PROBLEM — M20.41 ACQUIRED HAMMER TOE OF RIGHT FOOT: Status: ACTIVE | Noted: 2022-09-19

## 2022-09-19 PROBLEM — E78.2 MIXED HYPERLIPIDEMIA: Status: ACTIVE | Noted: 2022-09-19

## 2022-09-19 PROBLEM — R06.09 DYSPNEA ON EXERTION: Status: ACTIVE | Noted: 2019-01-07

## 2022-09-19 PROBLEM — E11.42 POLYNEUROPATHY DUE TO TYPE 2 DIABETES MELLITUS (HCC): Status: ACTIVE | Noted: 2022-09-19

## 2022-09-28 LAB
ALBUMIN SERPL-MCNC: 3.8 G/DL
ALP BLD-CCNC: 179 U/L
ALT SERPL-CCNC: 50 U/L
ANION GAP SERPL CALCULATED.3IONS-SCNC: 14 MMOL/L
AST SERPL-CCNC: 36 U/L
BASOPHILS ABSOLUTE: ABNORMAL
BASOPHILS RELATIVE PERCENT: ABNORMAL
BILIRUB SERPL-MCNC: 0.2 MG/DL (ref 0.1–1.4)
BUN BLDV-MCNC: 38 MG/DL
CALCIUM SERPL-MCNC: 9.4 MG/DL
CHLORIDE BLD-SCNC: 103 MMOL/L
CO2: 25 MMOL/L
CREAT SERPL-MCNC: 2 MG/DL
EOSINOPHILS ABSOLUTE: ABNORMAL
EOSINOPHILS RELATIVE PERCENT: ABNORMAL
GFR CALCULATED: 24
GLUCOSE BLD-MCNC: 181 MG/DL
HCT VFR BLD CALC: 35.9 % (ref 41–53)
HEMOGLOBIN: 11.5 G/DL (ref 13.5–17.5)
LYMPHOCYTES ABSOLUTE: ABNORMAL
LYMPHOCYTES RELATIVE PERCENT: ABNORMAL
MCH RBC QN AUTO: ABNORMAL PG
MCHC RBC AUTO-ENTMCNC: ABNORMAL G/DL
MCV RBC AUTO: ABNORMAL FL
MONOCYTES ABSOLUTE: ABNORMAL
MONOCYTES RELATIVE PERCENT: ABNORMAL
NEUTROPHILS ABSOLUTE: ABNORMAL
NEUTROPHILS RELATIVE PERCENT: ABNORMAL
PLATELET # BLD: 189 K/ΜL
PMV BLD AUTO: ABNORMAL FL
POTASSIUM SERPL-SCNC: 5.4 MMOL/L
RBC # BLD: 3.6 10^6/ΜL
SODIUM BLD-SCNC: 137 MMOL/L
TOTAL PROTEIN: 8.1
WBC # BLD: 7.05 10^3/ML

## 2022-10-05 LAB
ALBUMIN SERPL-MCNC: 3.5 G/DL
BUN / CREAT RATIO: NORMAL
BUN BLDV-MCNC: 30 MG/DL
CALCIUM SERPL-MCNC: 8.8 MG/DL
CHLORIDE BLD-SCNC: 102 MMOL/L
CO2: 22 MMOL/L
CREAT SERPL-MCNC: 2.7 MG/DL
GLUCOSE: 390
PHOSPHORUS: 3.7 MG/DL
POTASSIUM SERPL-SCNC: 4.7 MMOL/L
SODIUM BLD-SCNC: 136 MMOL/L

## 2022-10-13 LAB
T4 TOTAL: 0.35
TSH SERPL DL<=0.05 MIU/L-ACNC: 108.83 UIU/ML

## 2023-03-30 LAB
ALBUMIN SERPL-MCNC: 4.1 G/DL
ALP BLD-CCNC: 91 U/L
ALT SERPL-CCNC: 43 U/L
ANION GAP SERPL CALCULATED.3IONS-SCNC: 14 MMOL/L
AST SERPL-CCNC: 46 U/L
BILIRUB SERPL-MCNC: 0.4 MG/DL (ref 0.1–1.4)
BUN BLDV-MCNC: 30 MG/DL
CALCIUM SERPL-MCNC: 8.9 MG/DL
CHLORIDE BLD-SCNC: 103 MMOL/L
CO2: 21 MMOL/L
CREAT SERPL-MCNC: 2.1 MG/DL
EGFR: 33
GLUCOSE BLD-MCNC: 89 MG/DL
POTASSIUM SERPL-SCNC: 4.4 MMOL/L
SODIUM BLD-SCNC: 134 MMOL/L
TOTAL PROTEIN: 6.8

## 2023-06-16 LAB
ALBUMIN SERPL-MCNC: 4.1 G/DL
ALP BLD-CCNC: 134 U/L
ALT SERPL-CCNC: 38 U/L
ANION GAP SERPL CALCULATED.3IONS-SCNC: 20 MMOL/L
AST SERPL-CCNC: 35 U/L
BILIRUB SERPL-MCNC: 0.3 MG/DL (ref 0.1–1.4)
BUN BLDV-MCNC: 52 MG/DL
CALCIUM SERPL-MCNC: 9.5 MG/DL
CHLORIDE BLD-SCNC: 97 MMOL/L
CHOLESTEROL, TOTAL: 211 MG/DL
CHOLESTEROL/HDL RATIO: NORMAL
CO2: 20 MMOL/L
CREAT SERPL-MCNC: 3.6 MG/DL
EGFR: 18
GLUCOSE BLD-MCNC: 317 MG/DL
HDLC SERPL-MCNC: 49 MG/DL (ref 35–70)
LDL CHOLESTEROL CALCULATED: 83 MG/DL (ref 0–160)
NONHDLC SERPL-MCNC: NORMAL MG/DL
POTASSIUM SERPL-SCNC: 4.9 MMOL/L
SODIUM BLD-SCNC: 132 MMOL/L
TOTAL PROTEIN: 6.9
TRIGL SERPL-MCNC: 397 MG/DL
TSH SERPL DL<=0.05 MIU/L-ACNC: 37.1 UIU/ML
VLDLC SERPL CALC-MCNC: 79 MG/DL

## 2023-06-22 RX ORDER — AMIODARONE HYDROCHLORIDE 100 MG/1
100 TABLET ORAL DAILY
COMMUNITY

## 2023-06-22 RX ORDER — AMLODIPINE BESYLATE 5 MG/1
5 TABLET ORAL DAILY
COMMUNITY

## 2023-06-22 RX ORDER — QUETIAPINE FUMARATE 100 MG/1
100 TABLET, FILM COATED ORAL 2 TIMES DAILY
COMMUNITY

## 2023-06-22 RX ORDER — LEVOTHYROXINE SODIUM 0.1 MG/1
50 TABLET ORAL DAILY
COMMUNITY

## 2023-07-14 LAB
BUN BLDV-MCNC: 37 MG/DL
CALCIUM SERPL-MCNC: 8.8 MG/DL
CHLORIDE BLD-SCNC: 105 MMOL/L
CO2: 20 MMOL/L
CREAT SERPL-MCNC: 3 MG/DL
EGFR: 22
GLUCOSE BLD-MCNC: 144 MG/DL
PHOSPHORUS: 3.7 MG/DL
POTASSIUM SERPL-SCNC: 4.4 MMOL/L
SODIUM BLD-SCNC: 138 MMOL/L

## 2023-08-08 ENCOUNTER — OFFICE VISIT (OUTPATIENT)
Dept: NEPHROLOGY | Age: 73
End: 2023-08-08
Payer: MEDICARE

## 2023-08-08 VITALS
HEIGHT: 72 IN | WEIGHT: 167 LBS | SYSTOLIC BLOOD PRESSURE: 116 MMHG | OXYGEN SATURATION: 98 % | HEART RATE: 74 BPM | DIASTOLIC BLOOD PRESSURE: 70 MMHG | BODY MASS INDEX: 22.62 KG/M2

## 2023-08-08 DIAGNOSIS — N18.4 CKD (CHRONIC KIDNEY DISEASE) STAGE 4, GFR 15-29 ML/MIN (HCC): Primary | ICD-10-CM

## 2023-08-08 PROBLEM — R74.8 ABNORMAL LEVELS OF OTHER SERUM ENZYMES: Status: ACTIVE | Noted: 2021-12-14

## 2023-08-08 PROBLEM — K85.20 ALCOHOL INDUCED ACUTE PANCREATITIS WITHOUT NECROSIS OR INFECTION: Status: ACTIVE | Noted: 2022-01-22

## 2023-08-08 PROBLEM — R81 GLYCOSURIA: Status: ACTIVE | Noted: 2022-06-08

## 2023-08-08 PROBLEM — L89.150 PRESSURE ULCER OF SACRAL REGION, UNSTAGEABLE (HCC): Status: ACTIVE | Noted: 2022-06-08

## 2023-08-08 PROBLEM — T46.2X5A: Status: ACTIVE | Noted: 2022-10-13

## 2023-08-08 PROBLEM — D75.89 OTHER SPECIFIED DISEASES OF BLOOD AND BLOOD-FORMING ORGANS: Status: ACTIVE | Noted: 2022-06-08

## 2023-08-08 PROBLEM — K85.90 ACUTE PANCREATITIS WITHOUT NECROSIS OR INFECTION, UNSPECIFIED: Status: ACTIVE | Noted: 2022-01-22

## 2023-08-08 PROBLEM — E83.51 HYPOCALCEMIA: Status: ACTIVE | Noted: 2022-06-08

## 2023-08-08 PROBLEM — D50.0 IRON DEFICIENCY ANEMIA SECONDARY TO BLOOD LOSS (CHRONIC): Status: ACTIVE | Noted: 2022-03-29

## 2023-08-08 PROBLEM — F17.220 NICOTINE DEPENDENCE, CHEWING TOBACCO, UNCOMPLICATED: Status: ACTIVE | Noted: 2022-03-29

## 2023-08-08 PROBLEM — C61 MALIGNANT NEOPLASM OF PROSTATE (HCC): Status: ACTIVE | Noted: 2022-04-26

## 2023-08-08 PROBLEM — I21.A1 MYOCARDIAL INFARCTION TYPE 2 (HCC): Status: ACTIVE | Noted: 2022-09-16

## 2023-08-08 PROBLEM — E87.6 HYPOKALEMIA: Status: ACTIVE | Noted: 2022-06-08

## 2023-08-08 PROBLEM — R35.0 FREQUENCY OF MICTURITION: Status: ACTIVE | Noted: 2022-08-05

## 2023-08-08 PROBLEM — E83.39 OTHER DISORDERS OF PHOSPHORUS METABOLISM: Status: ACTIVE | Noted: 2022-06-08

## 2023-08-08 PROBLEM — E87.20 ACIDOSIS: Status: ACTIVE | Noted: 2022-06-08

## 2023-08-08 PROBLEM — E83.41 HYPERMAGNESEMIA: Status: ACTIVE | Noted: 2022-06-08

## 2023-08-08 PROBLEM — D64.9 ANEMIA, UNSPECIFIED: Status: ACTIVE | Noted: 2022-03-18

## 2023-08-08 PROBLEM — E05.80 OTHER THYROTOXICOSIS WITHOUT THYROTOXIC CRISIS OR STORM: Status: ACTIVE | Noted: 2022-10-13

## 2023-08-08 PROBLEM — L89.890 PRESSURE ULCER OF OTHER SITE, UNSTAGEABLE (HCC): Status: ACTIVE | Noted: 2022-06-08

## 2023-08-08 PROBLEM — R78.81 BACTEREMIA: Status: ACTIVE | Noted: 2022-06-08

## 2023-08-08 PROBLEM — R77.1 ABNORMALITY OF GLOBULIN: Status: ACTIVE | Noted: 2022-04-26

## 2023-08-08 PROBLEM — N17.9 ACUTE KIDNEY FAILURE, UNSPECIFIED (HCC): Status: ACTIVE | Noted: 2022-03-29

## 2023-08-08 PROBLEM — N30.01 ACUTE CYSTITIS WITH HEMATURIA: Status: ACTIVE | Noted: 2022-03-29

## 2023-08-08 PROBLEM — E44.0 MODERATE PROTEIN-CALORIE MALNUTRITION (HCC): Status: ACTIVE | Noted: 2022-03-07

## 2023-08-08 PROBLEM — D72.829 ELEVATED WHITE BLOOD CELL COUNT, UNSPECIFIED: Status: ACTIVE | Noted: 2022-06-08

## 2023-08-08 PROCEDURE — 1123F ACP DISCUSS/DSCN MKR DOCD: CPT | Performed by: INTERNAL MEDICINE

## 2023-08-08 PROCEDURE — 3078F DIAST BP <80 MM HG: CPT | Performed by: INTERNAL MEDICINE

## 2023-08-08 PROCEDURE — 99204 OFFICE O/P NEW MOD 45 MIN: CPT | Performed by: INTERNAL MEDICINE

## 2023-08-08 PROCEDURE — 3074F SYST BP LT 130 MM HG: CPT | Performed by: INTERNAL MEDICINE

## 2023-08-08 RX ORDER — INSULIN DETEMIR 100 [IU]/ML
INJECTION, SOLUTION SUBCUTANEOUS
COMMUNITY
Start: 2023-07-27

## 2023-08-08 RX ORDER — TAMSULOSIN HYDROCHLORIDE 0.4 MG/1
0.4 CAPSULE ORAL DAILY
COMMUNITY

## 2023-08-08 RX ORDER — PANTOPRAZOLE SODIUM 40 MG/1
40 TABLET, DELAYED RELEASE ORAL DAILY
COMMUNITY

## 2023-08-08 RX ORDER — THIAMINE HYDROCHLORIDE 100 MG/ML
100 INJECTION, SOLUTION INTRAMUSCULAR; INTRAVENOUS DAILY
COMMUNITY

## 2023-08-08 RX ORDER — LANOLIN ALCOHOL/MO/W.PET/CERES
400 CREAM (GRAM) TOPICAL DAILY
COMMUNITY

## 2023-08-08 RX ORDER — BUSPIRONE HYDROCHLORIDE 10 MG/1
10 TABLET ORAL 2 TIMES DAILY
COMMUNITY
Start: 2023-05-28

## 2023-08-08 NOTE — PROGRESS NOTES
[address]  [facility] [place] [address]  Dept: [phone]  Loc: [phone]  Outpatient Consult  [phone]  8/8/2023 1:26 PM EDT        Pt Name:    Suzette Noel  YOB: 1950  Primary Care Physician:  Easton Crystal MD     Chief Complaint:   Chief Complaint   Patient presents with    New Patient     Dr. Paresh Cox proteinuria         Background Information/Interval History:   The patient is a 72y.o. year old  male referred by Dr. Paresh Cox for worsening CKD IV. He has a history of uncontrolled DM >20 years. A1C was up to 10 in March. Has been above 12 in 2018 and 2019, > 14 in 2020. Has uncontrolled hypothyroidism ( TSH was 172 in Feb), PAFib, Hx CVA, prostate CA s/p radiation, moderate As, renal cysts  Last Urine MA was 352 mg/g in 2022. Creatinine was ranging around 2.5-2.8 mg/dL. Went up to 3.6 in June with a repeat of 3.0 mg/dL 7/14. He is accompanied by his son and daughter-in-law. Denies family history of kidney disease. Denies nsaid use. Bp looks good today  No edema. Has frothy urine. He reports sugars are doing better with Mounjaro. Has renal cysts seen on prior imaging. Allergies:  Patient has no known allergies.         Past Medical History:  Past Medical History:   Diagnosis Date    A-fib ([facility])     Chronic pain syndrome     CVA (cerebral vascular accident) ([facility])     Diabetes mellitus ([facility])     Heart murmur     Hyperlipidemia         Past Surgical History:  Past Surgical History:   Procedure Laterality Date    CATARACT REMOVAL Bilateral     CIRCUMCISION      SHOULDER SURGERY      TONSILLECTOMY      UPPER GASTROINTESTINAL ENDOSCOPY          Family History:  Family History   Problem Relation Age of Onset    Heart Disease Mother     Heart Attack Mother         Social History:  Social History     Socioeconomic History    Marital status:      Spouse name: Not on file

## 2023-08-09 DIAGNOSIS — N18.4 CKD (CHRONIC KIDNEY DISEASE) STAGE 4, GFR 15-29 ML/MIN (HCC): ICD-10-CM

## 2023-08-10 ENCOUNTER — TELEPHONE (OUTPATIENT)
Dept: NEPHROLOGY | Age: 73
End: 2023-08-10

## 2023-08-10 NOTE — TELEPHONE ENCOUNTER
----- Message from Riky Carlos DO sent at 8/9/2023  5:03 PM EDT -----  Ultrasound reviewed.  Cysts on kidneys look ok, no concerns

## 2023-08-14 LAB
BASOPHILS ABSOLUTE: 0.03 /ΜL
BASOPHILS RELATIVE PERCENT: 0.5 %
BUN BLDV-MCNC: 39 MG/DL
CALCIUM SERPL-MCNC: 9.6 MG/DL
CHLORIDE BLD-SCNC: 105 MMOL/L
CO2: 21 MMOL/L
CREAT SERPL-MCNC: 2.9 MG/DL
CREATININE, URINE: 71.9
EGFR: 23
EOSINOPHILS ABSOLUTE: 0.14 /ΜL
EOSINOPHILS RELATIVE PERCENT: 2.5 %
GLUCOSE BLD-MCNC: 176 MG/DL
HCT VFR BLD CALC: 34.4 % (ref 41–53)
HEMOGLOBIN: 11.5 G/DL (ref 13.5–17.5)
LYMPHOCYTES ABSOLUTE: 1.24 /ΜL
LYMPHOCYTES RELATIVE PERCENT: 21.8 %
MAGNESIUM: 2.2 MG/DL
MCH RBC QN AUTO: 31.9 PG
MCHC RBC AUTO-ENTMCNC: 33.4 G/DL
MCV RBC AUTO: 95.5 FL
MICROALBUMIN/CREAT 24H UR: >1140 MG/G{CREAT}
MICROALBUMIN/CREAT UR-RTO: 1585.5
MONOCYTES ABSOLUTE: 0.45 /ΜL
MONOCYTES RELATIVE PERCENT: 7.9 %
NEUTROPHILS ABSOLUTE: 3.8 /ΜL
NEUTROPHILS RELATIVE PERCENT: 66.9 %
PHOSPHORUS: 4.4 MG/DL
PLATELET # BLD: 176 K/ΜL
PMV BLD AUTO: 11 FL
POTASSIUM SERPL-SCNC: 4.3 MMOL/L
PTH INTACT: 96
RBC # BLD: 3.6 10^6/ΜL
SODIUM BLD-SCNC: 139 MMOL/L
WBC # BLD: 5.68 10^3/ML

## 2023-09-15 LAB — VITAMIN B-12: 837

## 2023-09-26 ENCOUNTER — OFFICE VISIT (OUTPATIENT)
Dept: NEPHROLOGY | Age: 73
End: 2023-09-26
Payer: MEDICARE

## 2023-09-26 VITALS
OXYGEN SATURATION: 98 % | SYSTOLIC BLOOD PRESSURE: 114 MMHG | HEART RATE: 74 BPM | BODY MASS INDEX: 23.06 KG/M2 | DIASTOLIC BLOOD PRESSURE: 58 MMHG | WEIGHT: 170 LBS

## 2023-09-26 DIAGNOSIS — N18.4 CKD (CHRONIC KIDNEY DISEASE) STAGE 4, GFR 15-29 ML/MIN (HCC): Primary | ICD-10-CM

## 2023-09-26 PROCEDURE — 1123F ACP DISCUSS/DSCN MKR DOCD: CPT | Performed by: INTERNAL MEDICINE

## 2023-09-26 PROCEDURE — 99214 OFFICE O/P EST MOD 30 MIN: CPT | Performed by: INTERNAL MEDICINE

## 2023-09-26 PROCEDURE — 3074F SYST BP LT 130 MM HG: CPT | Performed by: INTERNAL MEDICINE

## 2023-09-26 PROCEDURE — 3078F DIAST BP <80 MM HG: CPT | Performed by: INTERNAL MEDICINE

## 2023-09-26 RX ORDER — LISINOPRIL 5 MG/1
5 TABLET ORAL DAILY
Qty: 90 TABLET | Refills: 1 | Status: SHIPPED | OUTPATIENT
Start: 2023-09-26

## 2023-09-26 RX ORDER — VIT C/B6/B5/MAGNESIUM/HERB 173 50-5-6-5MG
CAPSULE ORAL DAILY
COMMUNITY

## 2023-10-16 ENCOUNTER — TELEPHONE (OUTPATIENT)
Dept: NEPHROLOGY | Age: 73
End: 2023-10-16

## 2023-10-16 DIAGNOSIS — N18.4 CKD (CHRONIC KIDNEY DISEASE) STAGE 4, GFR 15-29 ML/MIN (HCC): Primary | ICD-10-CM

## 2023-10-16 NOTE — TELEPHONE ENCOUNTER
Left message informing pt to stop Lisinopril and repeat labs in 2 weeks. Asked for a call back to confirm he understood the message.

## 2023-10-16 NOTE — TELEPHONE ENCOUNTER
Avelino Carrasco DO   Sent: Mon October 16, 2023  3:19 PM   To: [facility] Clinical Staff                Message    Stop lisinopril as kidney function is worse with it   Bmp 2 weeks

## 2023-10-30 LAB
BUN BLDV-MCNC: 36 MG/DL
CALCIUM SERPL-MCNC: 9.4 MG/DL
CHLORIDE BLD-SCNC: 105 MMOL/L
CO2: 21 MMOL/L
CREAT SERPL-MCNC: 3.2 MG/DL
EGFR: 20
GLUCOSE BLD-MCNC: 88 MG/DL
POTASSIUM SERPL-SCNC: 4.5 MMOL/L
SODIUM BLD-SCNC: 139 MMOL/L

## 2023-12-30 ENCOUNTER — HOSPITAL ENCOUNTER (INPATIENT)
Age: 73
LOS: 17 days | Discharge: NURSING, REHAB OR LONG-TERM CARE | DRG: 329 | End: 2024-01-16
Attending: STUDENT IN AN ORGANIZED HEALTH CARE EDUCATION/TRAINING PROGRAM | Admitting: STUDENT IN AN ORGANIZED HEALTH CARE EDUCATION/TRAINING PROGRAM
Payer: MEDICARE

## 2023-12-30 ENCOUNTER — APPOINTMENT (OUTPATIENT)
Dept: CT IMAGING | Age: 73
DRG: 329 | End: 2023-12-30
Attending: STUDENT IN AN ORGANIZED HEALTH CARE EDUCATION/TRAINING PROGRAM
Payer: MEDICARE

## 2023-12-30 DIAGNOSIS — K35.32 PERFORATED APPENDIX: ICD-10-CM

## 2023-12-30 DIAGNOSIS — D63.1 ANEMIA DUE TO STAGE 3A CHRONIC KIDNEY DISEASE (HCC): ICD-10-CM

## 2023-12-30 DIAGNOSIS — R01.1 MURMUR: Primary | ICD-10-CM

## 2023-12-30 DIAGNOSIS — N18.31 ANEMIA DUE TO STAGE 3A CHRONIC KIDNEY DISEASE (HCC): ICD-10-CM

## 2023-12-30 DIAGNOSIS — N17.9 ACUTE KIDNEY INJURY (HCC): ICD-10-CM

## 2023-12-30 PROBLEM — K56.7 ILEUS (HCC): Status: ACTIVE | Noted: 2023-12-30

## 2023-12-30 PROBLEM — N18.4 CKD (CHRONIC KIDNEY DISEASE) STAGE 4, GFR 15-29 ML/MIN (HCC): Status: ACTIVE | Noted: 2023-12-30

## 2023-12-30 LAB
ALBUMIN SERPL BCG-MCNC: 3.3 G/DL (ref 3.5–5.1)
ALP SERPL-CCNC: 94 U/L (ref 38–126)
ALT SERPL W/O P-5'-P-CCNC: 20 U/L (ref 11–66)
ANION GAP SERPL CALC-SCNC: 17 MEQ/L (ref 8–16)
APTT PPP: 46.1 SECONDS (ref 22–38)
AST SERPL-CCNC: 28 U/L (ref 5–40)
BACTERIA: ABNORMAL
BASOPHILS ABSOLUTE: 0 THOU/MM3 (ref 0–0.1)
BASOPHILS NFR BLD AUTO: 0.1 %
BILIRUB SERPL-MCNC: 0.2 MG/DL (ref 0.3–1.2)
BILIRUB UR QL STRIP: NEGATIVE
BUN SERPL-MCNC: 63 MG/DL (ref 7–22)
C CAYETANENSIS DNA SPEC QL NAA+PROBE: NOT DETECTED
C DIFF GDH STL QL: NEGATIVE
CALCIUM SERPL-MCNC: 8.8 MG/DL (ref 8.5–10.5)
CAMPY SP DNA.DIARRHEA STL QL NAA+PROBE: NOT DETECTED
CASTS #/AREA URNS LPF: ABNORMAL /LPF
CASTS #/AREA URNS LPF: ABNORMAL /LPF
CHARACTER UR: CLEAR
CHARCOAL URNS QL MICRO: ABNORMAL
CHLORIDE 24H UR-SRATE: < 20 MEQ/L
CHLORIDE SERPL-SCNC: 103 MEQ/L (ref 98–111)
CO2 SERPL-SCNC: 18 MEQ/L (ref 23–33)
COLOR UR: YELLOW
CREAT SERPL-MCNC: 6.4 MG/DL (ref 0.4–1.2)
CREAT UR-MCNC: 173.5 MG/DL
CRYPTOSP DNA SPEC QL NAA+PROBE: NOT DETECTED
CRYSTALS URNS QL MICRO: ABNORMAL
DEPRECATED RDW RBC AUTO: 46.9 FL (ref 35–45)
DEPRECATED RDW RBC AUTO: 48.3 FL (ref 35–45)
E COLI O157H7 DNA SPEC QL NAA+PROBE: ABNORMAL
E HISTOLYT DNA SPEC QL NAA+PROBE: NOT DETECTED
EAEC PAA PLAS AGGR+AATA ST NAA+NON-PRB: NOT DETECTED
EC STX1+STX2 + H7 FLIC SPEC NAA+PROBE: NOT DETECTED
EKG ATRIAL RATE: 90 BPM
EKG P AXIS: 70 DEGREES
EKG P-R INTERVAL: 192 MS
EKG Q-T INTERVAL: 352 MS
EKG QRS DURATION: 96 MS
EKG QTC CALCULATION (BAZETT): 430 MS
EKG R AXIS: -40 DEGREES
EKG T AXIS: 93 DEGREES
EKG VENTRICULAR RATE: 90 BPM
EOSINOPHIL NFR BLD AUTO: 0.4 %
EOSINOPHILS ABSOLUTE: 0 THOU/MM3 (ref 0–0.4)
EPEC EAE GENE STL QL NAA+NON-PROBE: DETECTED
EPITHELIAL CELLS, UA: ABNORMAL /HPF
ERYTHROCYTE [DISTWIDTH] IN BLOOD BY AUTOMATED COUNT: 12.7 % (ref 11.5–14.5)
ERYTHROCYTE [DISTWIDTH] IN BLOOD BY AUTOMATED COUNT: 12.7 % (ref 11.5–14.5)
ETEC LTA+ST1A+ST1B TOX ST NAA+NON-PROBE: NOT DETECTED
G LAMBLIA DNA SPEC QL NAA+PROBE: NOT DETECTED
GFR SERPL CREATININE-BSD FRML MDRD: 9 ML/MIN/1.73M2
GLUCOSE BLD STRIP.AUTO-MCNC: 103 MG/DL (ref 70–108)
GLUCOSE BLD STRIP.AUTO-MCNC: 235 MG/DL (ref 70–108)
GLUCOSE BLD STRIP.AUTO-MCNC: 241 MG/DL (ref 70–108)
GLUCOSE SERPL-MCNC: 84 MG/DL (ref 70–108)
GLUCOSE UR QL STRIP.AUTO: NEGATIVE MG/DL
HADV DNA SPEC QL NAA+PROBE: NOT DETECTED
HASTV RNA SPEC QL NAA+PROBE: NOT DETECTED
HCT VFR BLD AUTO: 29.3 % (ref 42–52)
HCT VFR BLD AUTO: 31.2 % (ref 42–52)
HGB BLD-MCNC: 10.1 GM/DL (ref 14–18)
HGB BLD-MCNC: 9.3 GM/DL (ref 14–18)
HGB UR QL STRIP.AUTO: ABNORMAL
IMM GRANULOCYTES # BLD AUTO: 0.05 THOU/MM3 (ref 0–0.07)
IMM GRANULOCYTES NFR BLD AUTO: 0.5 %
INR PPP: 1.23 (ref 0.85–1.13)
KETONES UR QL STRIP.AUTO: NEGATIVE
LACTATE SERPL-SCNC: 1.4 MMOL/L (ref 0.5–2)
LEUKOCYTE ESTERASE UR QL STRIP.AUTO: NEGATIVE
LYMPHOCYTES ABSOLUTE: 0.5 THOU/MM3 (ref 1–4.8)
LYMPHOCYTES NFR BLD AUTO: 5 %
MAGNESIUM SERPL-MCNC: 2.7 MG/DL (ref 1.6–2.4)
MCH RBC QN AUTO: 32.4 PG (ref 26–33)
MCH RBC QN AUTO: 32.6 PG (ref 26–33)
MCHC RBC AUTO-ENTMCNC: 31.7 GM/DL (ref 32.2–35.5)
MCHC RBC AUTO-ENTMCNC: 32.4 GM/DL (ref 32.2–35.5)
MCV RBC AUTO: 100.6 FL (ref 80–94)
MCV RBC AUTO: 102.1 FL (ref 80–94)
MONOCYTES ABSOLUTE: 0.3 THOU/MM3 (ref 0.4–1.3)
MONOCYTES NFR BLD AUTO: 3.5 %
NEUTROPHILS NFR BLD AUTO: 90.5 %
NITRITE UR QL STRIP.AUTO: NEGATIVE
NOROVIRUS GI + GII RNA STL NAA+PROBE: NOT DETECTED
NRBC BLD AUTO-RTO: 0 /100 WBC
OSMOLALITY UR: 334 MOSMOL/KG (ref 250–750)
P SHIGELLOIDES DNA STL QL NAA+PROBE: NOT DETECTED
PH UR STRIP.AUTO: 5 [PH] (ref 5–9)
PHOSPHATE SERPL-MCNC: 3.6 MG/DL (ref 2.4–4.7)
PLATELET # BLD AUTO: 124 THOU/MM3 (ref 130–400)
PLATELET # BLD AUTO: 140 THOU/MM3 (ref 130–400)
PMV BLD AUTO: 12 FL (ref 9.4–12.4)
PMV BLD AUTO: 12.1 FL (ref 9.4–12.4)
POTASSIUM SERPL-SCNC: 4.4 MEQ/L (ref 3.5–5.2)
POTASSIUM UR-SCNC: 49.7 MEQ/L
PROT SERPL-MCNC: 6.5 G/DL (ref 6.1–8)
PROT UR STRIP.AUTO-MCNC: 100 MG/DL
RBC # BLD AUTO: 2.87 MILL/MM3 (ref 4.7–6.1)
RBC # BLD AUTO: 3.1 MILL/MM3 (ref 4.7–6.1)
RBC #/AREA URNS HPF: ABNORMAL /HPF
RENAL EPI CELLS #/AREA URNS HPF: ABNORMAL /[HPF]
RV RNA SPEC QL NAA+PROBE: NOT DETECTED
SALMONELLA DNA SPEC QL NAA+PROBE: NOT DETECTED
SAPOVIRUS RNA SPEC QL NAA+PROBE: NOT DETECTED
SEGMENTED NEUTROPHILS ABSOLUTE COUNT: 8.5 THOU/MM3 (ref 1.8–7.7)
SHIGELLA SP+EIEC IPAH ST NAA+NON-PROBE: NOT DETECTED
SODIUM SERPL-SCNC: 138 MEQ/L (ref 135–145)
SODIUM UR-SCNC: 29 MEQ/L
SPECIFIC GRAVITY UA: 1.01 (ref 1–1.03)
UROBILINOGEN, URINE: 0.2 EU/DL (ref 0–1)
V CHOLERAE DNA SPEC QL NAA+PROBE: NOT DETECTED
VIBRIO DNA SPEC NAA+PROBE: NOT DETECTED
WBC # BLD AUTO: 8.8 THOU/MM3 (ref 4.8–10.8)
WBC # BLD AUTO: 9.4 THOU/MM3 (ref 4.8–10.8)
WBC #/AREA URNS HPF: ABNORMAL /HPF
Y ENTERO RECN STL QL NAA+PROBE: NOT DETECTED
YEAST LIKE FUNGI URNS QL MICRO: ABNORMAL

## 2023-12-30 PROCEDURE — 85025 COMPLETE CBC W/AUTO DIFF WBC: CPT

## 2023-12-30 PROCEDURE — 84100 ASSAY OF PHOSPHORUS: CPT

## 2023-12-30 PROCEDURE — 2580000003 HC RX 258: Performed by: PHYSICIAN ASSISTANT

## 2023-12-30 PROCEDURE — 87086 URINE CULTURE/COLONY COUNT: CPT

## 2023-12-30 PROCEDURE — 83735 ASSAY OF MAGNESIUM: CPT

## 2023-12-30 PROCEDURE — 82948 REAGENT STRIP/BLOOD GLUCOSE: CPT

## 2023-12-30 PROCEDURE — 83935 ASSAY OF URINE OSMOLALITY: CPT

## 2023-12-30 PROCEDURE — 51798 US URINE CAPACITY MEASURE: CPT

## 2023-12-30 PROCEDURE — 2580000003 HC RX 258: Performed by: INTERNAL MEDICINE

## 2023-12-30 PROCEDURE — 6360000002 HC RX W HCPCS: Performed by: INTERNAL MEDICINE

## 2023-12-30 PROCEDURE — 6370000000 HC RX 637 (ALT 250 FOR IP)

## 2023-12-30 PROCEDURE — 6360000002 HC RX W HCPCS: Performed by: STUDENT IN AN ORGANIZED HEALTH CARE EDUCATION/TRAINING PROGRAM

## 2023-12-30 PROCEDURE — 2580000003 HC RX 258: Performed by: STUDENT IN AN ORGANIZED HEALTH CARE EDUCATION/TRAINING PROGRAM

## 2023-12-30 PROCEDURE — 99223 1ST HOSP IP/OBS HIGH 75: CPT | Performed by: INTERNAL MEDICINE

## 2023-12-30 PROCEDURE — 85027 COMPLETE CBC AUTOMATED: CPT

## 2023-12-30 PROCEDURE — 99223 1ST HOSP IP/OBS HIGH 75: CPT | Performed by: STUDENT IN AN ORGANIZED HEALTH CARE EDUCATION/TRAINING PROGRAM

## 2023-12-30 PROCEDURE — 85730 THROMBOPLASTIN TIME PARTIAL: CPT

## 2023-12-30 PROCEDURE — 2140000000 HC CCU INTERMEDIATE R&B

## 2023-12-30 PROCEDURE — 87040 BLOOD CULTURE FOR BACTERIA: CPT

## 2023-12-30 PROCEDURE — 83605 ASSAY OF LACTIC ACID: CPT

## 2023-12-30 PROCEDURE — 81001 URINALYSIS AUTO W/SCOPE: CPT

## 2023-12-30 PROCEDURE — 82436 ASSAY OF URINE CHLORIDE: CPT

## 2023-12-30 PROCEDURE — 87449 NOS EACH ORGANISM AG IA: CPT

## 2023-12-30 PROCEDURE — 84133 ASSAY OF URINE POTASSIUM: CPT

## 2023-12-30 PROCEDURE — 84300 ASSAY OF URINE SODIUM: CPT

## 2023-12-30 PROCEDURE — 87507 IADNA-DNA/RNA PROBE TQ 12-25: CPT

## 2023-12-30 PROCEDURE — 93005 ELECTROCARDIOGRAM TRACING: CPT | Performed by: STUDENT IN AN ORGANIZED HEALTH CARE EDUCATION/TRAINING PROGRAM

## 2023-12-30 PROCEDURE — 36415 COLL VENOUS BLD VENIPUNCTURE: CPT

## 2023-12-30 PROCEDURE — 93010 ELECTROCARDIOGRAM REPORT: CPT | Performed by: INTERNAL MEDICINE

## 2023-12-30 PROCEDURE — 85610 PROTHROMBIN TIME: CPT

## 2023-12-30 PROCEDURE — 82570 ASSAY OF URINE CREATININE: CPT

## 2023-12-30 PROCEDURE — 80053 COMPREHEN METABOLIC PANEL: CPT

## 2023-12-30 RX ORDER — ATORVASTATIN CALCIUM 10 MG/1
10 TABLET, FILM COATED ORAL NIGHTLY
Status: DISCONTINUED | OUTPATIENT
Start: 2023-12-30 | End: 2024-01-16 | Stop reason: HOSPADM

## 2023-12-30 RX ORDER — LEVOTHYROXINE SODIUM 0.05 MG/1
50 TABLET ORAL DAILY
Status: DISCONTINUED | OUTPATIENT
Start: 2023-12-30 | End: 2024-01-16 | Stop reason: HOSPADM

## 2023-12-30 RX ORDER — HEPARIN SODIUM 10000 [USP'U]/100ML
5-30 INJECTION, SOLUTION INTRAVENOUS CONTINUOUS
Status: DISCONTINUED | OUTPATIENT
Start: 2023-12-30 | End: 2024-01-02

## 2023-12-30 RX ORDER — MAGNESIUM SULFATE IN WATER 40 MG/ML
2000 INJECTION, SOLUTION INTRAVENOUS PRN
Status: DISCONTINUED | OUTPATIENT
Start: 2023-12-30 | End: 2023-12-30

## 2023-12-30 RX ORDER — DEXTROSE, SODIUM CHLORIDE, SODIUM LACTATE, POTASSIUM CHLORIDE, AND CALCIUM CHLORIDE 5; .6; .31; .03; .02 G/100ML; G/100ML; G/100ML; G/100ML; G/100ML
INJECTION, SOLUTION INTRAVENOUS CONTINUOUS
Status: DISCONTINUED | OUTPATIENT
Start: 2023-12-30 | End: 2023-12-30

## 2023-12-30 RX ORDER — SODIUM CHLORIDE 0.9 % (FLUSH) 0.9 %
5-40 SYRINGE (ML) INJECTION EVERY 12 HOURS SCHEDULED
Status: DISCONTINUED | OUTPATIENT
Start: 2023-12-30 | End: 2024-01-16 | Stop reason: HOSPADM

## 2023-12-30 RX ORDER — SODIUM CHLORIDE 9 MG/ML
INJECTION, SOLUTION INTRAVENOUS CONTINUOUS
Status: DISCONTINUED | OUTPATIENT
Start: 2023-12-30 | End: 2023-12-30

## 2023-12-30 RX ORDER — SODIUM CHLORIDE 0.9 % (FLUSH) 0.9 %
5-40 SYRINGE (ML) INJECTION PRN
Status: DISCONTINUED | OUTPATIENT
Start: 2023-12-30 | End: 2024-01-16 | Stop reason: HOSPADM

## 2023-12-30 RX ORDER — BUSPIRONE HYDROCHLORIDE 10 MG/1
10 TABLET ORAL 2 TIMES DAILY
Status: DISCONTINUED | OUTPATIENT
Start: 2023-12-30 | End: 2024-01-16 | Stop reason: HOSPADM

## 2023-12-30 RX ORDER — HEPARIN SODIUM 1000 [USP'U]/ML
4000 INJECTION, SOLUTION INTRAVENOUS; SUBCUTANEOUS PRN
Status: DISCONTINUED | OUTPATIENT
Start: 2023-12-30 | End: 2024-01-02

## 2023-12-30 RX ORDER — POTASSIUM CHLORIDE 20 MEQ/1
40 TABLET, EXTENDED RELEASE ORAL PRN
Status: DISCONTINUED | OUTPATIENT
Start: 2023-12-30 | End: 2023-12-30

## 2023-12-30 RX ORDER — SODIUM CHLORIDE 9 MG/ML
INJECTION, SOLUTION INTRAVENOUS PRN
Status: DISCONTINUED | OUTPATIENT
Start: 2023-12-30 | End: 2024-01-16 | Stop reason: HOSPADM

## 2023-12-30 RX ORDER — AMLODIPINE BESYLATE 5 MG/1
5 TABLET ORAL DAILY
Status: DISCONTINUED | OUTPATIENT
Start: 2023-12-30 | End: 2024-01-08

## 2023-12-30 RX ORDER — HEPARIN SODIUM 1000 [USP'U]/ML
4000 INJECTION, SOLUTION INTRAVENOUS; SUBCUTANEOUS ONCE
Status: COMPLETED | OUTPATIENT
Start: 2023-12-30 | End: 2023-12-30

## 2023-12-30 RX ORDER — ACETAMINOPHEN 650 MG/1
650 SUPPOSITORY RECTAL EVERY 6 HOURS PRN
Status: DISCONTINUED | OUTPATIENT
Start: 2023-12-30 | End: 2024-01-02

## 2023-12-30 RX ORDER — POLYETHYLENE GLYCOL 3350 17 G/17G
17 POWDER, FOR SOLUTION ORAL DAILY PRN
Status: DISCONTINUED | OUTPATIENT
Start: 2023-12-30 | End: 2024-01-02

## 2023-12-30 RX ORDER — SODIUM CHLORIDE, SODIUM LACTATE, POTASSIUM CHLORIDE, CALCIUM CHLORIDE 600; 310; 30; 20 MG/100ML; MG/100ML; MG/100ML; MG/100ML
INJECTION, SOLUTION INTRAVENOUS CONTINUOUS
Status: DISCONTINUED | OUTPATIENT
Start: 2023-12-30 | End: 2023-12-31

## 2023-12-30 RX ORDER — ACETAMINOPHEN 325 MG/1
650 TABLET ORAL EVERY 6 HOURS PRN
Status: DISCONTINUED | OUTPATIENT
Start: 2023-12-30 | End: 2024-01-02

## 2023-12-30 RX ORDER — HEPARIN SODIUM 1000 [USP'U]/ML
2000 INJECTION, SOLUTION INTRAVENOUS; SUBCUTANEOUS PRN
Status: DISCONTINUED | OUTPATIENT
Start: 2023-12-30 | End: 2024-01-02

## 2023-12-30 RX ORDER — DEXTROSE AND SODIUM CHLORIDE 5; .45 G/100ML; G/100ML
INJECTION, SOLUTION INTRAVENOUS CONTINUOUS
Status: DISCONTINUED | OUTPATIENT
Start: 2023-12-30 | End: 2023-12-30

## 2023-12-30 RX ORDER — GABAPENTIN 600 MG/1
300 TABLET ORAL NIGHTLY
Status: DISCONTINUED | OUTPATIENT
Start: 2023-12-30 | End: 2024-01-16 | Stop reason: HOSPADM

## 2023-12-30 RX ORDER — QUETIAPINE FUMARATE 100 MG/1
100 TABLET, FILM COATED ORAL 2 TIMES DAILY
Status: DISCONTINUED | OUTPATIENT
Start: 2023-12-30 | End: 2024-01-16 | Stop reason: HOSPADM

## 2023-12-30 RX ORDER — AMIODARONE HYDROCHLORIDE 200 MG/1
100 TABLET ORAL DAILY
Status: DISCONTINUED | OUTPATIENT
Start: 2023-12-30 | End: 2024-01-16 | Stop reason: HOSPADM

## 2023-12-30 RX ORDER — DEXTROSE MONOHYDRATE, SODIUM CHLORIDE, AND POTASSIUM CHLORIDE 50; 1.49; 4.5 G/1000ML; G/1000ML; G/1000ML
INJECTION, SOLUTION INTRAVENOUS CONTINUOUS
Status: DISCONTINUED | OUTPATIENT
Start: 2023-12-30 | End: 2023-12-30

## 2023-12-30 RX ORDER — ONDANSETRON 4 MG/1
4 TABLET, ORALLY DISINTEGRATING ORAL EVERY 8 HOURS PRN
Status: DISCONTINUED | OUTPATIENT
Start: 2023-12-30 | End: 2024-01-16 | Stop reason: HOSPADM

## 2023-12-30 RX ORDER — TAMSULOSIN HYDROCHLORIDE 0.4 MG/1
0.4 CAPSULE ORAL DAILY
Status: DISCONTINUED | OUTPATIENT
Start: 2023-12-30 | End: 2024-01-16 | Stop reason: HOSPADM

## 2023-12-30 RX ORDER — POTASSIUM CHLORIDE 7.45 MG/ML
10 INJECTION INTRAVENOUS PRN
Status: DISCONTINUED | OUTPATIENT
Start: 2023-12-30 | End: 2023-12-30

## 2023-12-30 RX ORDER — ONDANSETRON 2 MG/ML
4 INJECTION INTRAMUSCULAR; INTRAVENOUS EVERY 6 HOURS PRN
Status: DISCONTINUED | OUTPATIENT
Start: 2023-12-30 | End: 2024-01-16 | Stop reason: HOSPADM

## 2023-12-30 RX ADMIN — PIPERACILLIN AND TAZOBACTAM 3375 MG: 3; .375 INJECTION, POWDER, FOR SOLUTION INTRAVENOUS at 20:20

## 2023-12-30 RX ADMIN — SODIUM CHLORIDE, POTASSIUM CHLORIDE, SODIUM LACTATE AND CALCIUM CHLORIDE: 600; 310; 30; 20 INJECTION, SOLUTION INTRAVENOUS at 22:27

## 2023-12-30 RX ADMIN — DEXTROSE AND SODIUM CHLORIDE: 5; 450 INJECTION, SOLUTION INTRAVENOUS at 06:07

## 2023-12-30 RX ADMIN — HEPARIN SODIUM 2000 UNITS: 1000 INJECTION INTRAVENOUS; SUBCUTANEOUS at 22:25

## 2023-12-30 RX ADMIN — BUSPIRONE HYDROCHLORIDE 10 MG: 10 TABLET ORAL at 20:03

## 2023-12-30 RX ADMIN — LEVOTHYROXINE SODIUM 50 MCG: 0.05 TABLET ORAL at 09:56

## 2023-12-30 RX ADMIN — AMIODARONE HYDROCHLORIDE 100 MG: 200 TABLET ORAL at 09:56

## 2023-12-30 RX ADMIN — HEPARIN SODIUM 12 UNITS/KG/HR: 10000 INJECTION, SOLUTION INTRAVENOUS at 15:43

## 2023-12-30 RX ADMIN — QUETIAPINE FUMARATE 100 MG: 100 TABLET ORAL at 20:03

## 2023-12-30 RX ADMIN — QUETIAPINE FUMARATE 100 MG: 100 TABLET ORAL at 09:56

## 2023-12-30 RX ADMIN — PIPERACILLIN AND TAZOBACTAM 4500 MG: 4; .5 INJECTION, POWDER, FOR SOLUTION INTRAVENOUS at 06:47

## 2023-12-30 RX ADMIN — SODIUM CHLORIDE, SODIUM LACTATE, POTASSIUM CHLORIDE, CALCIUM CHLORIDE AND DEXTROSE MONOHYDRATE: 5; 600; 310; 30; 20 INJECTION, SOLUTION INTRAVENOUS at 18:33

## 2023-12-30 RX ADMIN — SODIUM CHLORIDE, SODIUM LACTATE, POTASSIUM CHLORIDE, CALCIUM CHLORIDE AND DEXTROSE MONOHYDRATE: 5; 600; 310; 30; 20 INJECTION, SOLUTION INTRAVENOUS at 09:48

## 2023-12-30 RX ADMIN — BUSPIRONE HYDROCHLORIDE 10 MG: 10 TABLET ORAL at 09:56

## 2023-12-30 RX ADMIN — PIPERACILLIN AND TAZOBACTAM 3375 MG: 3; .375 INJECTION, POWDER, FOR SOLUTION INTRAVENOUS at 12:17

## 2023-12-30 RX ADMIN — HEPARIN SODIUM 4000 UNITS: 1000 INJECTION INTRAVENOUS; SUBCUTANEOUS at 15:42

## 2023-12-30 RX ADMIN — GABAPENTIN 300 MG: 600 TABLET, FILM COATED ORAL at 20:03

## 2023-12-30 RX ADMIN — ATORVASTATIN CALCIUM 10 MG: 10 TABLET, FILM COATED ORAL at 20:03

## 2023-12-30 NOTE — PROCEDURES
Bladder scan completed at 4:36AM and results told to Linda ABURTO. Scan showed 343 mL in the patients bladder. Ledy

## 2023-12-30 NOTE — H&P
has been created using voice recognition software. It may contain minor errors which are inherent in voice recognition technology

## 2023-12-30 NOTE — CONSULTS
[facility]    [address] [id number]  [phone]     Hospital Consult  12/30/2023 7:31 AM    Pt Name:    Phi Sanz  MRN:     [id number]   [id number]  YOB: 1950  Admit Date:    12/30/2023  4:02 AM  Primary Care Physician:  Kaushal Ramsay MD        Reason for Consult:  JOSE on CKD IV    History:   The patient is a 73 y.o. male seen in renal consult for JOSE. Baseline creatinine around 3 with CKD due to diabetic nephropathy. Also has hx of prostate CA, uncontrolled DM, HTN, Pafib, CVA. He presented to [facility] c/o severe diarrhea since Tuesday associated with abdominal pain. Was found to have JOSE with creatinine in the 6s so was transferred here for nephrology care.  There was concern for possible appendicitis as well so surgery was consulted, they feel more colitis and managing medically.  Nephrology consulted for JOSE. Has had severe diarrhea, recent antibiotic use.  CT at outside hospital also showed blaccer wall thickening consistent with chronic bladder outlet obstruction or cystitis, enlarged prostate bland, b/l hyperdense renal lesions, suggestive of hemorrhagic of proteinaceious cysts, recommending CT or MRI.     Past Medical History:  Past Medical History:   Diagnosis Date    A-fib (HCC)     Chronic pain syndrome     CVA (cerebral vascular accident) (HCC)     Diabetes mellitus (HCC)     Heart murmur     Hyperlipidemia        Past Surgical History:  Past Surgical History:   Procedure Laterality Date    CATARACT REMOVAL Bilateral     CIRCUMCISION      SHOULDER SURGERY      TONSILLECTOMY      UPPER GASTROINTESTINAL ENDOSCOPY         Family History:  Family History   Problem Relation Age of Onset    Heart Disease Mother     Heart Attack Mother        Social History:  Social History     Socioeconomic History    Marital status:      Spouse name: Not on file    Number of children: Not on file    Years of education: Not on file    Highest

## 2023-12-30 NOTE — PROCEDURES
Bladder scan was completed by J Leopold at 1445. The residual amount of 331 ml was resulted to Teja RN.

## 2023-12-31 ENCOUNTER — APPOINTMENT (OUTPATIENT)
Dept: GENERAL RADIOLOGY | Age: 73
DRG: 329 | End: 2023-12-31
Attending: STUDENT IN AN ORGANIZED HEALTH CARE EDUCATION/TRAINING PROGRAM
Payer: MEDICARE

## 2023-12-31 LAB
ALBUMIN SERPL BCG-MCNC: 2.5 G/DL (ref 3.5–5.1)
ALP SERPL-CCNC: 82 U/L (ref 38–126)
ALT SERPL W/O P-5'-P-CCNC: 16 U/L (ref 11–66)
ANION GAP SERPL CALC-SCNC: 17 MEQ/L (ref 8–16)
APTT PPP: 52.6 SECONDS (ref 22–38)
APTT PPP: 55.3 SECONDS (ref 22–38)
APTT PPP: 56.7 SECONDS (ref 22–38)
AST SERPL-CCNC: 18 U/L (ref 5–40)
BACTERIA UR CULT: ABNORMAL
BASE EXCESS BLDA CALC-SCNC: -8.4 MMOL/L (ref -2–3)
BILIRUB CONJ SERPL-MCNC: < 0.2 MG/DL (ref 0–0.3)
BILIRUB SERPL-MCNC: 0.2 MG/DL (ref 0.3–1.2)
BUN SERPL-MCNC: 62 MG/DL (ref 7–22)
CALCIUM SERPL-MCNC: 8.2 MG/DL (ref 8.5–10.5)
CHLORIDE SERPL-SCNC: 100 MEQ/L (ref 98–111)
CO2 SERPL-SCNC: 15 MEQ/L (ref 23–33)
COLLECTED BY:: ABNORMAL
CREAT SERPL-MCNC: 6.5 MG/DL (ref 0.4–1.2)
DAT IGG: NORMAL
DAT POLY-SP REAG RBC QL: NORMAL
DEPRECATED MEAN GLUCOSE BLD GHB EST-ACNC: 126 MG/DL (ref 70–126)
DEPRECATED RDW RBC AUTO: 48.2 FL (ref 35–45)
ERYTHROCYTE [DISTWIDTH] IN BLOOD BY AUTOMATED COUNT: 12.9 % (ref 11.5–14.5)
FERRITIN SERPL IA-MCNC: 1536 NG/ML (ref 22–322)
FOLATE SERPL-MCNC: > 20 NG/ML (ref 4.8–24.2)
GFR SERPL CREATININE-BSD FRML MDRD: 8 ML/MIN/1.73M2
GLUCOSE BLD STRIP.AUTO-MCNC: 230 MG/DL (ref 70–108)
GLUCOSE BLD STRIP.AUTO-MCNC: 244 MG/DL (ref 70–108)
GLUCOSE BLD STRIP.AUTO-MCNC: 264 MG/DL (ref 70–108)
GLUCOSE BLD STRIP.AUTO-MCNC: 279 MG/DL (ref 70–108)
GLUCOSE SERPL-MCNC: 221 MG/DL (ref 70–108)
HBA1C MFR BLD HPLC: 6.2 % (ref 4.4–6.4)
HCO3 BLDA-SCNC: 15 MMOL/L (ref 23–28)
HCT VFR BLD AUTO: 26.7 % (ref 42–52)
HGB BLD-MCNC: 8.5 GM/DL (ref 14–18)
HGB RETIC QN AUTO: 30.6 PG (ref 28.2–35.7)
IMM RETICS NFR: 6.3 % (ref 2.3–13.4)
IRON SATN MFR SERPL: 11 % (ref 20–50)
IRON SERPL-MCNC: 15 UG/DL (ref 65–195)
LDH SERPL L TO P-CCNC: 126 U/L (ref 100–190)
MAGNESIUM SERPL-MCNC: 2.3 MG/DL (ref 1.6–2.4)
MCH RBC QN AUTO: 32.4 PG (ref 26–33)
MCHC RBC AUTO-ENTMCNC: 31.8 GM/DL (ref 32.2–35.5)
MCV RBC AUTO: 101.9 FL (ref 80–94)
ORGANISM: ABNORMAL
PCO2 TEMP ADJ BLDMV: 24 MMHG (ref 41–51)
PH BLDMV: 7.41 [PH] (ref 7.31–7.41)
PHOSPHATE SERPL-MCNC: 4 MG/DL (ref 2.4–4.7)
PLATELET # BLD AUTO: 121 THOU/MM3 (ref 130–400)
PMV BLD AUTO: 12 FL (ref 9.4–12.4)
PO2 BLDMV: 55 MMHG (ref 25–40)
POTASSIUM SERPL-SCNC: 4.2 MEQ/L (ref 3.5–5.2)
PROT SERPL-MCNC: 5.2 G/DL (ref 6.1–8)
RBC # BLD AUTO: 2.62 MILL/MM3 (ref 4.7–6.1)
RETICS # AUTO: 27 THOU/MM3 (ref 20–115)
RETICS/RBC NFR AUTO: 0.9 % (ref 0.5–2)
SAO2 % BLDMV: 89 %
SODIUM SERPL-SCNC: 132 MEQ/L (ref 135–145)
TIBC SERPL-MCNC: 135 UG/DL (ref 171–450)
VIT B12 SERPL-MCNC: 1218 PG/ML (ref 211–911)
WBC # BLD AUTO: 9.1 THOU/MM3 (ref 4.8–10.8)

## 2023-12-31 PROCEDURE — 85046 RETICYTE/HGB CONCENTRATE: CPT

## 2023-12-31 PROCEDURE — 86880 COOMBS TEST DIRECT: CPT

## 2023-12-31 PROCEDURE — 83735 ASSAY OF MAGNESIUM: CPT

## 2023-12-31 PROCEDURE — 82803 BLOOD GASES ANY COMBINATION: CPT

## 2023-12-31 PROCEDURE — 99232 SBSQ HOSP IP/OBS MODERATE 35: CPT | Performed by: INTERNAL MEDICINE

## 2023-12-31 PROCEDURE — 6370000000 HC RX 637 (ALT 250 FOR IP)

## 2023-12-31 PROCEDURE — 2580000003 HC RX 258: Performed by: INTERNAL MEDICINE

## 2023-12-31 PROCEDURE — 83036 HEMOGLOBIN GLYCOSYLATED A1C: CPT

## 2023-12-31 PROCEDURE — 6360000002 HC RX W HCPCS: Performed by: INTERNAL MEDICINE

## 2023-12-31 PROCEDURE — 6360000002 HC RX W HCPCS: Performed by: STUDENT IN AN ORGANIZED HEALTH CARE EDUCATION/TRAINING PROGRAM

## 2023-12-31 PROCEDURE — 82607 VITAMIN B-12: CPT

## 2023-12-31 PROCEDURE — 85027 COMPLETE CBC AUTOMATED: CPT

## 2023-12-31 PROCEDURE — 83615 LACTATE (LD) (LDH) ENZYME: CPT

## 2023-12-31 PROCEDURE — 6370000000 HC RX 637 (ALT 250 FOR IP): Performed by: INTERNAL MEDICINE

## 2023-12-31 PROCEDURE — 51798 US URINE CAPACITY MEASURE: CPT

## 2023-12-31 PROCEDURE — 83010 ASSAY OF HAPTOGLOBIN QUANT: CPT

## 2023-12-31 PROCEDURE — 83540 ASSAY OF IRON: CPT

## 2023-12-31 PROCEDURE — 2500000003 HC RX 250 WO HCPCS: Performed by: INTERNAL MEDICINE

## 2023-12-31 PROCEDURE — 82948 REAGENT STRIP/BLOOD GLUCOSE: CPT

## 2023-12-31 PROCEDURE — 82746 ASSAY OF FOLIC ACID SERUM: CPT

## 2023-12-31 PROCEDURE — 2140000000 HC CCU INTERMEDIATE R&B

## 2023-12-31 PROCEDURE — 85730 THROMBOPLASTIN TIME PARTIAL: CPT

## 2023-12-31 PROCEDURE — 82248 BILIRUBIN DIRECT: CPT

## 2023-12-31 PROCEDURE — 36415 COLL VENOUS BLD VENIPUNCTURE: CPT

## 2023-12-31 PROCEDURE — 82728 ASSAY OF FERRITIN: CPT

## 2023-12-31 PROCEDURE — 84100 ASSAY OF PHOSPHORUS: CPT

## 2023-12-31 PROCEDURE — 80053 COMPREHEN METABOLIC PANEL: CPT

## 2023-12-31 PROCEDURE — 74018 RADEX ABDOMEN 1 VIEW: CPT

## 2023-12-31 PROCEDURE — 83550 IRON BINDING TEST: CPT

## 2023-12-31 PROCEDURE — 2580000003 HC RX 258: Performed by: STUDENT IN AN ORGANIZED HEALTH CARE EDUCATION/TRAINING PROGRAM

## 2023-12-31 RX ORDER — INSULIN LISPRO 100 [IU]/ML
0-4 INJECTION, SOLUTION INTRAVENOUS; SUBCUTANEOUS NIGHTLY
Status: DISCONTINUED | OUTPATIENT
Start: 2023-12-31 | End: 2024-01-02

## 2023-12-31 RX ORDER — INSULIN LISPRO 100 [IU]/ML
0-4 INJECTION, SOLUTION INTRAVENOUS; SUBCUTANEOUS
Status: DISCONTINUED | OUTPATIENT
Start: 2023-12-31 | End: 2024-01-02

## 2023-12-31 RX ORDER — DEXTROSE MONOHYDRATE 100 MG/ML
INJECTION, SOLUTION INTRAVENOUS CONTINUOUS PRN
Status: DISCONTINUED | OUTPATIENT
Start: 2023-12-31 | End: 2024-01-16 | Stop reason: HOSPADM

## 2023-12-31 RX ORDER — IBUPROFEN 600 MG/1
1 TABLET ORAL PRN
Status: DISCONTINUED | OUTPATIENT
Start: 2023-12-31 | End: 2024-01-16 | Stop reason: HOSPADM

## 2023-12-31 RX ADMIN — BUSPIRONE HYDROCHLORIDE 10 MG: 10 TABLET ORAL at 08:10

## 2023-12-31 RX ADMIN — QUETIAPINE FUMARATE 100 MG: 100 TABLET ORAL at 19:55

## 2023-12-31 RX ADMIN — PIPERACILLIN AND TAZOBACTAM 3375 MG: 3; .375 INJECTION, POWDER, FOR SOLUTION INTRAVENOUS at 04:10

## 2023-12-31 RX ADMIN — HEPARIN SODIUM 19 UNITS/KG/HR: 10000 INJECTION, SOLUTION INTRAVENOUS at 15:31

## 2023-12-31 RX ADMIN — LEVOTHYROXINE SODIUM 50 MCG: 0.05 TABLET ORAL at 08:10

## 2023-12-31 RX ADMIN — BUSPIRONE HYDROCHLORIDE 10 MG: 10 TABLET ORAL at 19:55

## 2023-12-31 RX ADMIN — GABAPENTIN 300 MG: 600 TABLET, FILM COATED ORAL at 19:55

## 2023-12-31 RX ADMIN — QUETIAPINE FUMARATE 100 MG: 100 TABLET ORAL at 08:10

## 2023-12-31 RX ADMIN — ATORVASTATIN CALCIUM 10 MG: 10 TABLET, FILM COATED ORAL at 19:55

## 2023-12-31 RX ADMIN — PIPERACILLIN SODIUM AND TAZOBACTAM SODIUM 3375 MG: 3; .375 INJECTION, POWDER, LYOPHILIZED, FOR SOLUTION INTRAVENOUS at 17:05

## 2023-12-31 RX ADMIN — INSULIN LISPRO 2 UNITS: 100 INJECTION, SOLUTION INTRAVENOUS; SUBCUTANEOUS at 11:32

## 2023-12-31 RX ADMIN — SODIUM BICARBONATE: 84 INJECTION, SOLUTION INTRAVENOUS at 09:20

## 2023-12-31 RX ADMIN — AMIODARONE HYDROCHLORIDE 100 MG: 200 TABLET ORAL at 08:10

## 2023-12-31 RX ADMIN — INSULIN LISPRO 2 UNITS: 100 INJECTION, SOLUTION INTRAVENOUS; SUBCUTANEOUS at 17:04

## 2023-12-31 RX ADMIN — INSULIN LISPRO 1 UNITS: 100 INJECTION, SOLUTION INTRAVENOUS; SUBCUTANEOUS at 09:15

## 2023-12-31 NOTE — CONSULTS
[address] [id number]                                  CONSULTATION    PATIENT NAME: NABIL GUALLPA                    :        1950  MED REC NO:   [id number]                           ROOM:       0032  ACCOUNT NO:   [id number]                           ADMIT DATE: 2023  PROVIDER:     Chaitanya Noble MD    CONSULT DATE:  2023    CHIEF COMPLAINT: Possible perforated appendicitis.    HISTORY OF PRESENT ILLNESS:  The patient is a 73-year-old male who  basically has had a 5-day history of diarrhea.  He initially denies  having any abdominal pain with the diarrhea, but over the last 24 hours  or so he has had onset of some right lower quadrant pain.  The patient  was weak, was taken to the emergency room at [facility]  where he was noted to have a creatinine of 6, and a CT scan was  performed that was apparently read there as likely colitis.  He was  transferred here for further evaluation.  He does see Dr. Richardson as  an outpatient.  I did discuss the case with her, and apparently he is  rather noncompliant, having out of control of diabetes.  He denies any  other issues with me, but he does have some hypertension and  hypercholesterolism.  Nonetheless, a CT scan was then read by outside  radiologist; it was read as possible perforated appendicitis with a few  air bubbles in the right lower quadrant, significant inflammation.  He  was transferred and admitted to the Hospital Service.  I was called  _____ this morning for my evaluation.    PAST MEDICAL HISTORY:  Again is apparently positive for AFib which he  did not mention to me.  He has a history of CVA, diabetes,  hyperlipidemia, and hypertension.    PAST SURGICAL HISTORY:  Includes a remote tonsillectomy.  He has had  cataract removal.  He has had some orthopedic procedures on his  shoulder.  He has had no abdominal surgeries.    SOCIAL

## 2023-12-31 NOTE — PROCEDURES
Bladder scan completed at 12:00AM and results told to Linda ABURTO. Scan showed 304 mL in the patients bladder. Ledy

## 2024-01-01 ENCOUNTER — APPOINTMENT (OUTPATIENT)
Dept: CT IMAGING | Age: 74
DRG: 329 | End: 2024-01-01
Attending: STUDENT IN AN ORGANIZED HEALTH CARE EDUCATION/TRAINING PROGRAM
Payer: MEDICARE

## 2024-01-01 LAB
ANION GAP SERPL CALC-SCNC: 17 MEQ/L (ref 8–16)
APTT PPP: 64.3 SECONDS (ref 22–38)
APTT PPP: 67.2 SECONDS (ref 22–38)
APTT PPP: 69.1 SECONDS (ref 22–38)
BUN SERPL-MCNC: 62 MG/DL (ref 7–22)
CALCIUM SERPL-MCNC: 8.2 MG/DL (ref 8.5–10.5)
CHLORIDE SERPL-SCNC: 98 MEQ/L (ref 98–111)
CO2 SERPL-SCNC: 17 MEQ/L (ref 23–33)
CREAT SERPL-MCNC: 5.7 MG/DL (ref 0.4–1.2)
DEPRECATED RDW RBC AUTO: 47.1 FL (ref 35–45)
ERYTHROCYTE [DISTWIDTH] IN BLOOD BY AUTOMATED COUNT: 13 % (ref 11.5–14.5)
GFR SERPL CREATININE-BSD FRML MDRD: 10 ML/MIN/1.73M2
GLUCOSE BLD STRIP.AUTO-MCNC: 262 MG/DL (ref 70–108)
GLUCOSE BLD STRIP.AUTO-MCNC: 277 MG/DL (ref 70–108)
GLUCOSE BLD STRIP.AUTO-MCNC: 288 MG/DL (ref 70–108)
GLUCOSE BLD STRIP.AUTO-MCNC: 294 MG/DL (ref 70–108)
GLUCOSE SERPL-MCNC: 256 MG/DL (ref 70–108)
HCT VFR BLD AUTO: 26.4 % (ref 42–52)
HGB BLD-MCNC: 8.7 GM/DL (ref 14–18)
MCH RBC QN AUTO: 33 PG (ref 26–33)
MCHC RBC AUTO-ENTMCNC: 33 GM/DL (ref 32.2–35.5)
MCV RBC AUTO: 100 FL (ref 80–94)
PHOSPHATE SERPL-MCNC: 4.4 MG/DL (ref 2.4–4.7)
PLATELET # BLD AUTO: 146 THOU/MM3 (ref 130–400)
PMV BLD AUTO: 11.6 FL (ref 9.4–12.4)
POTASSIUM SERPL-SCNC: 4.2 MEQ/L (ref 3.5–5.2)
RBC # BLD AUTO: 2.64 MILL/MM3 (ref 4.7–6.1)
REVIEWED BY: NORMAL
SMEAR REVIEW: NORMAL
SODIUM SERPL-SCNC: 132 MEQ/L (ref 135–145)
WBC # BLD AUTO: 9.7 THOU/MM3 (ref 4.8–10.8)

## 2024-01-01 PROCEDURE — 99232 SBSQ HOSP IP/OBS MODERATE 35: CPT | Performed by: INTERNAL MEDICINE

## 2024-01-01 PROCEDURE — 2580000003 HC RX 258: Performed by: STUDENT IN AN ORGANIZED HEALTH CARE EDUCATION/TRAINING PROGRAM

## 2024-01-01 PROCEDURE — 2140000000 HC CCU INTERMEDIATE R&B

## 2024-01-01 PROCEDURE — 84100 ASSAY OF PHOSPHORUS: CPT

## 2024-01-01 PROCEDURE — 36415 COLL VENOUS BLD VENIPUNCTURE: CPT

## 2024-01-01 PROCEDURE — 80048 BASIC METABOLIC PNL TOTAL CA: CPT

## 2024-01-01 PROCEDURE — 74176 CT ABD & PELVIS W/O CONTRAST: CPT

## 2024-01-01 PROCEDURE — 2500000003 HC RX 250 WO HCPCS: Performed by: INTERNAL MEDICINE

## 2024-01-01 PROCEDURE — 6370000000 HC RX 637 (ALT 250 FOR IP): Performed by: INTERNAL MEDICINE

## 2024-01-01 PROCEDURE — 2580000003 HC RX 258: Performed by: INTERNAL MEDICINE

## 2024-01-01 PROCEDURE — 85730 THROMBOPLASTIN TIME PARTIAL: CPT

## 2024-01-01 PROCEDURE — 82948 REAGENT STRIP/BLOOD GLUCOSE: CPT

## 2024-01-01 PROCEDURE — 6360000002 HC RX W HCPCS: Performed by: STUDENT IN AN ORGANIZED HEALTH CARE EDUCATION/TRAINING PROGRAM

## 2024-01-01 PROCEDURE — 85027 COMPLETE CBC AUTOMATED: CPT

## 2024-01-01 PROCEDURE — 6370000000 HC RX 637 (ALT 250 FOR IP)

## 2024-01-01 RX ADMIN — AMIODARONE HYDROCHLORIDE 100 MG: 200 TABLET ORAL at 08:40

## 2024-01-01 RX ADMIN — LEVOTHYROXINE SODIUM 50 MCG: 0.05 TABLET ORAL at 08:40

## 2024-01-01 RX ADMIN — SODIUM BICARBONATE: 84 INJECTION, SOLUTION INTRAVENOUS at 03:19

## 2024-01-01 RX ADMIN — ATORVASTATIN CALCIUM 10 MG: 10 TABLET, FILM COATED ORAL at 22:15

## 2024-01-01 RX ADMIN — PIPERACILLIN SODIUM AND TAZOBACTAM SODIUM 3375 MG: 3; .375 INJECTION, POWDER, LYOPHILIZED, FOR SOLUTION INTRAVENOUS at 03:19

## 2024-01-01 RX ADMIN — BUSPIRONE HYDROCHLORIDE 10 MG: 10 TABLET ORAL at 08:40

## 2024-01-01 RX ADMIN — INSULIN LISPRO 2 UNITS: 100 INJECTION, SOLUTION INTRAVENOUS; SUBCUTANEOUS at 17:17

## 2024-01-01 RX ADMIN — INSULIN LISPRO 2 UNITS: 100 INJECTION, SOLUTION INTRAVENOUS; SUBCUTANEOUS at 08:46

## 2024-01-01 RX ADMIN — QUETIAPINE FUMARATE 100 MG: 100 TABLET ORAL at 22:14

## 2024-01-01 RX ADMIN — QUETIAPINE FUMARATE 100 MG: 100 TABLET ORAL at 08:40

## 2024-01-01 RX ADMIN — PIPERACILLIN SODIUM AND TAZOBACTAM SODIUM 3375 MG: 3; .375 INJECTION, POWDER, LYOPHILIZED, FOR SOLUTION INTRAVENOUS at 17:19

## 2024-01-01 RX ADMIN — ACETAMINOPHEN 650 MG: 325 TABLET ORAL at 08:40

## 2024-01-01 RX ADMIN — INSULIN LISPRO 2 UNITS: 100 INJECTION, SOLUTION INTRAVENOUS; SUBCUTANEOUS at 13:23

## 2024-01-01 RX ADMIN — GABAPENTIN 300 MG: 600 TABLET, FILM COATED ORAL at 22:14

## 2024-01-01 RX ADMIN — BUSPIRONE HYDROCHLORIDE 10 MG: 10 TABLET ORAL at 22:15

## 2024-01-01 ASSESSMENT — PAIN DESCRIPTION - LOCATION: LOCATION: ABDOMEN

## 2024-01-01 ASSESSMENT — PAIN DESCRIPTION - DESCRIPTORS: DESCRIPTORS: ACHING;DISCOMFORT

## 2024-01-01 ASSESSMENT — PAIN DESCRIPTION - ORIENTATION: ORIENTATION: RIGHT;LOWER

## 2024-01-01 ASSESSMENT — PAIN SCALES - GENERAL
PAINLEVEL_OUTOF10: 3
PAINLEVEL_OUTOF10: 0

## 2024-01-01 ASSESSMENT — PAIN - FUNCTIONAL ASSESSMENT: PAIN_FUNCTIONAL_ASSESSMENT: ACTIVITIES ARE NOT PREVENTED

## 2024-01-02 ENCOUNTER — ANESTHESIA (OUTPATIENT)
Dept: OPERATING ROOM | Age: 74
End: 2024-01-02
Payer: MEDICARE

## 2024-01-02 ENCOUNTER — ANESTHESIA EVENT (OUTPATIENT)
Dept: OPERATING ROOM | Age: 74
End: 2024-01-02
Payer: MEDICARE

## 2024-01-02 PROBLEM — E44.0 MODERATE MALNUTRITION (HCC): Chronic | Status: ACTIVE | Noted: 2022-03-07

## 2024-01-02 LAB
ANION GAP SERPL CALC-SCNC: 23 MEQ/L (ref 8–16)
BUN SERPL-MCNC: 58 MG/DL (ref 7–22)
CALCIUM SERPL-MCNC: 8.1 MG/DL (ref 8.5–10.5)
CHLORIDE SERPL-SCNC: 96 MEQ/L (ref 98–111)
CO2 SERPL-SCNC: 14 MEQ/L (ref 23–33)
CREAT SERPL-MCNC: 5.7 MG/DL (ref 0.4–1.2)
DEPRECATED RDW RBC AUTO: 46.5 FL (ref 35–45)
ERYTHROCYTE [DISTWIDTH] IN BLOOD BY AUTOMATED COUNT: 13 % (ref 11.5–14.5)
GFR SERPL CREATININE-BSD FRML MDRD: 10 ML/MIN/1.73M2
GLUCOSE BLD STRIP.AUTO-MCNC: 247 MG/DL (ref 70–108)
GLUCOSE BLD STRIP.AUTO-MCNC: 289 MG/DL (ref 70–108)
GLUCOSE BLD STRIP.AUTO-MCNC: 289 MG/DL (ref 70–108)
GLUCOSE SERPL-MCNC: 274 MG/DL (ref 70–108)
HCT VFR BLD AUTO: 26 % (ref 42–52)
HGB BLD-MCNC: 8.4 GM/DL (ref 14–18)
LACTATE SERPL-SCNC: 0.6 MMOL/L (ref 0.5–2)
MCH RBC QN AUTO: 32.1 PG (ref 26–33)
MCHC RBC AUTO-ENTMCNC: 32.3 GM/DL (ref 32.2–35.5)
MCV RBC AUTO: 99.2 FL (ref 80–94)
PHOSPHATE SERPL-MCNC: 4.3 MG/DL (ref 2.4–4.7)
PLATELET # BLD AUTO: 161 THOU/MM3 (ref 130–400)
PMV BLD AUTO: 11.3 FL (ref 9.4–12.4)
POTASSIUM SERPL-SCNC: 3.9 MEQ/L (ref 3.5–5.2)
RBC # BLD AUTO: 2.62 MILL/MM3 (ref 4.7–6.1)
SODIUM SERPL-SCNC: 133 MEQ/L (ref 135–145)
WBC # BLD AUTO: 9.4 THOU/MM3 (ref 4.8–10.8)

## 2024-01-02 PROCEDURE — 87205 SMEAR GRAM STAIN: CPT

## 2024-01-02 PROCEDURE — 2580000003 HC RX 258: Performed by: STUDENT IN AN ORGANIZED HEALTH CARE EDUCATION/TRAINING PROGRAM

## 2024-01-02 PROCEDURE — 2580000003 HC RX 258: Performed by: INTERNAL MEDICINE

## 2024-01-02 PROCEDURE — 99232 SBSQ HOSP IP/OBS MODERATE 35: CPT | Performed by: INTERNAL MEDICINE

## 2024-01-02 PROCEDURE — 36415 COLL VENOUS BLD VENIPUNCTURE: CPT

## 2024-01-02 PROCEDURE — 3700000001 HC ADD 15 MINUTES (ANESTHESIA): Performed by: SURGERY

## 2024-01-02 PROCEDURE — 6360000002 HC RX W HCPCS: Performed by: NURSE ANESTHETIST, CERTIFIED REGISTERED

## 2024-01-02 PROCEDURE — 82948 REAGENT STRIP/BLOOD GLUCOSE: CPT

## 2024-01-02 PROCEDURE — 0W9J0ZZ DRAINAGE OF PELVIC CAVITY, OPEN APPROACH: ICD-10-PCS | Performed by: SURGERY

## 2024-01-02 PROCEDURE — 6360000002 HC RX W HCPCS: Performed by: INTERNAL MEDICINE

## 2024-01-02 PROCEDURE — 85027 COMPLETE CBC AUTOMATED: CPT

## 2024-01-02 PROCEDURE — 0DTH0ZZ RESECTION OF CECUM, OPEN APPROACH: ICD-10-PCS | Performed by: SURGERY

## 2024-01-02 PROCEDURE — 2500000003 HC RX 250 WO HCPCS: Performed by: INTERNAL MEDICINE

## 2024-01-02 PROCEDURE — 3600000014 HC SURGERY LEVEL 4 ADDTL 15MIN: Performed by: SURGERY

## 2024-01-02 PROCEDURE — 84100 ASSAY OF PHOSPHORUS: CPT

## 2024-01-02 PROCEDURE — 7100000000 HC PACU RECOVERY - FIRST 15 MIN: Performed by: SURGERY

## 2024-01-02 PROCEDURE — 6360000002 HC RX W HCPCS: Performed by: STUDENT IN AN ORGANIZED HEALTH CARE EDUCATION/TRAINING PROGRAM

## 2024-01-02 PROCEDURE — 2500000003 HC RX 250 WO HCPCS: Performed by: NURSE ANESTHETIST, CERTIFIED REGISTERED

## 2024-01-02 PROCEDURE — 6370000000 HC RX 637 (ALT 250 FOR IP)

## 2024-01-02 PROCEDURE — 2720000010 HC SURG SUPPLY STERILE: Performed by: SURGERY

## 2024-01-02 PROCEDURE — 6370000000 HC RX 637 (ALT 250 FOR IP): Performed by: INTERNAL MEDICINE

## 2024-01-02 PROCEDURE — 3700000000 HC ANESTHESIA ATTENDED CARE: Performed by: SURGERY

## 2024-01-02 PROCEDURE — 2500000003 HC RX 250 WO HCPCS: Performed by: SURGERY

## 2024-01-02 PROCEDURE — 2580000003 HC RX 258: Performed by: NURSE ANESTHETIST, CERTIFIED REGISTERED

## 2024-01-02 PROCEDURE — 80048 BASIC METABOLIC PNL TOTAL CA: CPT

## 2024-01-02 PROCEDURE — 2709999900 HC NON-CHARGEABLE SUPPLY: Performed by: SURGERY

## 2024-01-02 PROCEDURE — 87070 CULTURE OTHR SPECIMN AEROBIC: CPT

## 2024-01-02 PROCEDURE — 2140000000 HC CCU INTERMEDIATE R&B

## 2024-01-02 PROCEDURE — 6370000000 HC RX 637 (ALT 250 FOR IP): Performed by: SURGERY

## 2024-01-02 PROCEDURE — 7100000001 HC PACU RECOVERY - ADDTL 15 MIN: Performed by: SURGERY

## 2024-01-02 PROCEDURE — 87077 CULTURE AEROBIC IDENTIFY: CPT

## 2024-01-02 PROCEDURE — 88307 TISSUE EXAM BY PATHOLOGIST: CPT

## 2024-01-02 PROCEDURE — 87075 CULTR BACTERIA EXCEPT BLOOD: CPT

## 2024-01-02 PROCEDURE — 83605 ASSAY OF LACTIC ACID: CPT

## 2024-01-02 PROCEDURE — 2580000003 HC RX 258: Performed by: SURGERY

## 2024-01-02 PROCEDURE — 3600000004 HC SURGERY LEVEL 4 BASE: Performed by: SURGERY

## 2024-01-02 PROCEDURE — 87186 SC STD MICRODIL/AGAR DIL: CPT

## 2024-01-02 PROCEDURE — 6360000002 HC RX W HCPCS: Performed by: SURGERY

## 2024-01-02 RX ORDER — DEXAMETHASONE SODIUM PHOSPHATE 10 MG/ML
INJECTION, EMULSION INTRAMUSCULAR; INTRAVENOUS PRN
Status: DISCONTINUED | OUTPATIENT
Start: 2024-01-02 | End: 2024-01-02 | Stop reason: SDUPTHER

## 2024-01-02 RX ORDER — SODIUM CHLORIDE 9 MG/ML
INJECTION, SOLUTION INTRAVENOUS CONTINUOUS PRN
Status: DISCONTINUED | OUTPATIENT
Start: 2024-01-02 | End: 2024-01-02 | Stop reason: SDUPTHER

## 2024-01-02 RX ORDER — SUCCINYLCHOLINE/SOD CL,ISO/PF 200MG/10ML
SYRINGE (ML) INTRAVENOUS PRN
Status: DISCONTINUED | OUTPATIENT
Start: 2024-01-02 | End: 2024-01-02 | Stop reason: SDUPTHER

## 2024-01-02 RX ORDER — INSULIN LISPRO 100 [IU]/ML
0-8 INJECTION, SOLUTION INTRAVENOUS; SUBCUTANEOUS
Status: DISCONTINUED | OUTPATIENT
Start: 2024-01-02 | End: 2024-01-03

## 2024-01-02 RX ORDER — INSULIN LISPRO 100 [IU]/ML
0.05 INJECTION, SOLUTION INTRAVENOUS; SUBCUTANEOUS
Status: DISCONTINUED | OUTPATIENT
Start: 2024-01-02 | End: 2024-01-03

## 2024-01-02 RX ORDER — ONDANSETRON 2 MG/ML
INJECTION INTRAMUSCULAR; INTRAVENOUS PRN
Status: DISCONTINUED | OUTPATIENT
Start: 2024-01-02 | End: 2024-01-02 | Stop reason: SDUPTHER

## 2024-01-02 RX ORDER — ROCURONIUM BROMIDE 10 MG/ML
INJECTION, SOLUTION INTRAVENOUS PRN
Status: DISCONTINUED | OUTPATIENT
Start: 2024-01-02 | End: 2024-01-02 | Stop reason: SDUPTHER

## 2024-01-02 RX ORDER — LIDOCAINE HCL/PF 100 MG/5ML
SYRINGE (ML) INJECTION PRN
Status: DISCONTINUED | OUTPATIENT
Start: 2024-01-02 | End: 2024-01-02 | Stop reason: SDUPTHER

## 2024-01-02 RX ORDER — FENTANYL CITRATE 50 UG/ML
INJECTION, SOLUTION INTRAMUSCULAR; INTRAVENOUS PRN
Status: DISCONTINUED | OUTPATIENT
Start: 2024-01-02 | End: 2024-01-02 | Stop reason: SDUPTHER

## 2024-01-02 RX ORDER — MIDAZOLAM HYDROCHLORIDE 1 MG/ML
INJECTION INTRAMUSCULAR; INTRAVENOUS PRN
Status: DISCONTINUED | OUTPATIENT
Start: 2024-01-02 | End: 2024-01-02 | Stop reason: SDUPTHER

## 2024-01-02 RX ORDER — PROPOFOL 10 MG/ML
INJECTION, EMULSION INTRAVENOUS PRN
Status: DISCONTINUED | OUTPATIENT
Start: 2024-01-02 | End: 2024-01-02 | Stop reason: SDUPTHER

## 2024-01-02 RX ORDER — INSULIN LISPRO 100 [IU]/ML
0-4 INJECTION, SOLUTION INTRAVENOUS; SUBCUTANEOUS NIGHTLY
Status: DISCONTINUED | OUTPATIENT
Start: 2024-01-02 | End: 2024-01-03

## 2024-01-02 RX ORDER — INSULIN GLARGINE 100 [IU]/ML
0.15 INJECTION, SOLUTION SUBCUTANEOUS NIGHTLY
Status: DISCONTINUED | OUTPATIENT
Start: 2024-01-02 | End: 2024-01-03

## 2024-01-02 RX ORDER — PHENYLEPHRINE HCL IN 0.9% NACL 1 MG/10 ML
SYRINGE (ML) INTRAVENOUS PRN
Status: DISCONTINUED | OUTPATIENT
Start: 2024-01-02 | End: 2024-01-02 | Stop reason: SDUPTHER

## 2024-01-02 RX ADMIN — Medication 140 MG: at 17:23

## 2024-01-02 RX ADMIN — Medication 200 MCG: at 18:52

## 2024-01-02 RX ADMIN — ONDANSETRON 4 MG: 2 INJECTION INTRAMUSCULAR; INTRAVENOUS at 17:55

## 2024-01-02 RX ADMIN — HEPARIN SODIUM 21 UNITS/KG/HR: 10000 INJECTION, SOLUTION INTRAVENOUS at 03:38

## 2024-01-02 RX ADMIN — SODIUM BICARBONATE: 84 INJECTION, SOLUTION INTRAVENOUS at 01:33

## 2024-01-02 RX ADMIN — INSULIN LISPRO 4 UNITS: 100 INJECTION, SOLUTION INTRAVENOUS; SUBCUTANEOUS at 08:31

## 2024-01-02 RX ADMIN — ROCURONIUM BROMIDE 10 MG: 10 INJECTION INTRAVENOUS at 17:45

## 2024-01-02 RX ADMIN — LEVOTHYROXINE SODIUM 50 MCG: 0.05 TABLET ORAL at 06:28

## 2024-01-02 RX ADMIN — SODIUM CHLORIDE: 9 INJECTION, SOLUTION INTRAVENOUS at 17:18

## 2024-01-02 RX ADMIN — ROCURONIUM BROMIDE 40 MG: 10 INJECTION INTRAVENOUS at 17:30

## 2024-01-02 RX ADMIN — SUGAMMADEX 200 MG: 100 INJECTION, SOLUTION INTRAVENOUS at 19:23

## 2024-01-02 RX ADMIN — BUSPIRONE HYDROCHLORIDE 10 MG: 10 TABLET ORAL at 08:31

## 2024-01-02 RX ADMIN — Medication 200 MCG: at 17:55

## 2024-01-02 RX ADMIN — Medication 200 MCG: at 18:39

## 2024-01-02 RX ADMIN — Medication 100 MG: at 17:23

## 2024-01-02 RX ADMIN — PIPERACILLIN SODIUM AND TAZOBACTAM SODIUM 3375 MG: 3; .375 INJECTION, POWDER, LYOPHILIZED, FOR SOLUTION INTRAVENOUS at 16:00

## 2024-01-02 RX ADMIN — PIPERACILLIN SODIUM AND TAZOBACTAM SODIUM 3375 MG: 3; .375 INJECTION, POWDER, LYOPHILIZED, FOR SOLUTION INTRAVENOUS at 03:44

## 2024-01-02 RX ADMIN — QUETIAPINE FUMARATE 100 MG: 100 TABLET ORAL at 08:31

## 2024-01-02 RX ADMIN — MIDAZOLAM 2 MG: 1 INJECTION INTRAMUSCULAR; INTRAVENOUS at 17:18

## 2024-01-02 RX ADMIN — PROPOFOL 130 MG: 10 INJECTION, EMULSION INTRAVENOUS at 17:23

## 2024-01-02 RX ADMIN — FENTANYL CITRATE 50 MCG: 50 INJECTION, SOLUTION INTRAMUSCULAR; INTRAVENOUS at 17:49

## 2024-01-02 RX ADMIN — AMIODARONE HYDROCHLORIDE 100 MG: 200 TABLET ORAL at 08:31

## 2024-01-02 RX ADMIN — Medication 200 MCG: at 18:05

## 2024-01-02 RX ADMIN — SODIUM CHLORIDE: 9 INJECTION, SOLUTION INTRAVENOUS at 19:41

## 2024-01-02 RX ADMIN — Medication 100 MCG: at 17:37

## 2024-01-02 RX ADMIN — FENTANYL CITRATE 50 MCG: 50 INJECTION, SOLUTION INTRAMUSCULAR; INTRAVENOUS at 18:46

## 2024-01-02 RX ADMIN — FENTANYL CITRATE 75 MCG: 50 INJECTION, SOLUTION INTRAMUSCULAR; INTRAVENOUS at 19:34

## 2024-01-02 RX ADMIN — HYDROMORPHONE HYDROCHLORIDE 0.5 MG: 1 INJECTION, SOLUTION INTRAMUSCULAR; INTRAVENOUS; SUBCUTANEOUS at 22:40

## 2024-01-02 RX ADMIN — INSULIN GLARGINE 12 UNITS: 100 INJECTION, SOLUTION SUBCUTANEOUS at 22:40

## 2024-01-02 RX ADMIN — SODIUM BICARBONATE: 84 INJECTION, SOLUTION INTRAVENOUS at 22:38

## 2024-01-02 RX ADMIN — FENTANYL CITRATE 25 MCG: 50 INJECTION, SOLUTION INTRAMUSCULAR; INTRAVENOUS at 19:28

## 2024-01-02 RX ADMIN — DEXAMETHASONE SODIUM PHOSPHATE 10 MG: 10 INJECTION, EMULSION INTRAMUSCULAR; INTRAVENOUS at 17:55

## 2024-01-02 RX ADMIN — Medication 200 MCG: at 18:28

## 2024-01-02 ASSESSMENT — PAIN DESCRIPTION - DESCRIPTORS: DESCRIPTORS: ACHING;CRUSHING

## 2024-01-02 ASSESSMENT — PAIN DESCRIPTION - ORIENTATION: ORIENTATION: MID

## 2024-01-02 ASSESSMENT — PAIN DESCRIPTION - FREQUENCY: FREQUENCY: CONTINUOUS

## 2024-01-02 ASSESSMENT — PAIN DESCRIPTION - DIRECTION: RADIATING_TOWARDS: BELLY

## 2024-01-02 ASSESSMENT — PAIN SCALES - GENERAL
PAINLEVEL_OUTOF10: 6
PAINLEVEL_OUTOF10: 0

## 2024-01-02 ASSESSMENT — PAIN DESCRIPTION - ONSET: ONSET: ON-GOING

## 2024-01-02 ASSESSMENT — PAIN DESCRIPTION - LOCATION: LOCATION: ABDOMEN

## 2024-01-02 ASSESSMENT — PAIN SCALES - WONG BAKER
WONGBAKER_NUMERICALRESPONSE: 0
WONGBAKER_NUMERICALRESPONSE: 0

## 2024-01-02 ASSESSMENT — PAIN - FUNCTIONAL ASSESSMENT
PAIN_FUNCTIONAL_ASSESSMENT: WONG-BAKER FACES
PAIN_FUNCTIONAL_ASSESSMENT: ACTIVITIES ARE NOT PREVENTED

## 2024-01-02 ASSESSMENT — PAIN DESCRIPTION - PAIN TYPE: TYPE: SURGICAL PAIN

## 2024-01-02 NOTE — ANESTHESIA PRE PROCEDURE
uncomplicated F17.220    Myocardial infarction type 2 (HCC) I21.A1    Other disorders of phosphorus metabolism E83.39    Other specified diseases of blood and blood-forming organs D75.89    Other thyrotoxicosis without thyrotoxic crisis or storm E05.80    Pressure ulcer of other site, unstageable (HCC) L89.890    Pressure ulcer of sacral region, unstageable (HCC) L89.150    Glycosuria R81    Acute kidney injury (HCC) N17.9    Ruptured appendicitis K35.32    Ileus (HCC) K56.7    CKD (chronic kidney disease) stage 4, GFR 15-29 ml/min (HCC) N18.4       Past Medical History:        Diagnosis Date    A-fib (HCC)     Chronic pain syndrome     CVA (cerebral vascular accident) (HCC)     Diabetes mellitus (HCC)     Heart murmur     Hyperlipidemia        Past Surgical History:        Procedure Laterality Date    CATARACT REMOVAL Bilateral     CIRCUMCISION      SHOULDER SURGERY      TONSILLECTOMY      UPPER GASTROINTESTINAL ENDOSCOPY         Social History:    Social History     Tobacco Use    Smoking status: Former    Smokeless tobacco: Current   Substance Use Topics    Alcohol use: Yes                                Ready to quit: Not Answered  Counseling given: Not Answered      Vital Signs (Current):   Vitals:    01/02/24 0800 01/02/24 1110 01/02/24 1545 01/02/24 1603   BP: (!) 141/71 111/61 (!) 143/64    Pulse: 87 78 82    Resp: 20 18 18    Temp: 98 °F (36.7 °C) 98.2 °F (36.8 °C) 97.9 °F (36.6 °C)    TempSrc: Oral Oral Oral    SpO2: 98% 99% 100%    Weight:       Height:    1.829 m (6' 0.01\")                                              BP Readings from Last 3 Encounters:   01/02/24 (!) 143/64   09/26/23 (!) 114/58   08/08/23 116/70       NPO Status:                                                                                 BMI:   Wt Readings from Last 3 Encounters:   01/02/24 77.8 kg (171 lb 8.3 oz)   09/26/23 77.1 kg (170 lb)   08/08/23 75.8 kg (167 lb)     Body mass index is 23.26 kg/m².    CBC:   Lab Results

## 2024-01-03 ENCOUNTER — APPOINTMENT (OUTPATIENT)
Dept: GENERAL RADIOLOGY | Age: 74
DRG: 329 | End: 2024-01-03
Attending: STUDENT IN AN ORGANIZED HEALTH CARE EDUCATION/TRAINING PROGRAM
Payer: MEDICARE

## 2024-01-03 PROBLEM — A09 DIARRHEA, INFECTIOUS, ADULT: Status: ACTIVE | Noted: 2024-01-03

## 2024-01-03 PROBLEM — N40.0 BENIGN PROSTATIC HYPERPLASIA WITHOUT LOWER URINARY TRACT SYMPTOMS: Status: ACTIVE | Noted: 2024-01-03

## 2024-01-03 PROBLEM — R53.1 GENERALIZED WEAKNESS: Status: ACTIVE | Noted: 2024-01-03

## 2024-01-03 PROBLEM — E11.9 TYPE 2 DIABETES MELLITUS WITH INSULIN THERAPY (HCC): Status: ACTIVE | Noted: 2022-09-19

## 2024-01-03 PROBLEM — R01.1 MURMUR: Status: ACTIVE | Noted: 2024-01-03

## 2024-01-03 PROBLEM — D69.6 THROMBOCYTOPENIA (HCC): Status: ACTIVE | Noted: 2024-01-03

## 2024-01-03 PROBLEM — Z79.01 CHRONIC ANTICOAGULATION: Status: ACTIVE | Noted: 2024-01-03

## 2024-01-03 PROBLEM — Z79.4 TYPE 2 DIABETES MELLITUS WITH INSULIN THERAPY (HCC): Status: ACTIVE | Noted: 2022-09-19

## 2024-01-03 PROBLEM — K65.1 RIGHT LOWER QUADRANT ABDOMINAL ABSCESS (HCC): Status: ACTIVE | Noted: 2024-01-03

## 2024-01-03 PROBLEM — E03.9 HYPOTHYROIDISM: Status: ACTIVE | Noted: 2024-01-03

## 2024-01-03 PROBLEM — I48.0 PAF (PAROXYSMAL ATRIAL FIBRILLATION) (HCC): Status: ACTIVE | Noted: 2021-12-16

## 2024-01-03 PROBLEM — E87.1 HYPONATREMIA: Status: ACTIVE | Noted: 2024-01-03

## 2024-01-03 PROBLEM — E78.5 HYPERLIPIDEMIA: Status: ACTIVE | Noted: 2022-09-19

## 2024-01-03 LAB
ANION GAP SERPL CALC-SCNC: 30 MEQ/L (ref 8–16)
BASE EXCESS BLDA CALC-SCNC: -13.7 MMOL/L (ref -2–3)
BUN SERPL-MCNC: 66 MG/DL (ref 7–22)
CALCIUM SERPL-MCNC: 7.9 MG/DL (ref 8.5–10.5)
CHLORIDE SERPL-SCNC: 93 MEQ/L (ref 98–111)
CO2 SERPL-SCNC: 11 MEQ/L (ref 23–33)
COLLECTED BY:: ABNORMAL
CREAT SERPL-MCNC: 6.4 MG/DL (ref 0.4–1.2)
DEPRECATED RDW RBC AUTO: 50.7 FL (ref 35–45)
ERYTHROCYTE [DISTWIDTH] IN BLOOD BY AUTOMATED COUNT: 13.3 % (ref 11.5–14.5)
GFR SERPL CREATININE-BSD FRML MDRD: 9 ML/MIN/1.73M2
GLUCOSE BLD STRIP.AUTO-MCNC: 270 MG/DL (ref 70–108)
GLUCOSE BLD STRIP.AUTO-MCNC: 346 MG/DL (ref 70–108)
GLUCOSE BLD STRIP.AUTO-MCNC: 354 MG/DL (ref 70–108)
GLUCOSE SERPL-MCNC: 381 MG/DL (ref 70–108)
HAPTOGLOB SERPL-MCNC: 363 MG/DL (ref 30–200)
HCO3 BLDA-SCNC: 11 MMOL/L (ref 23–28)
HCT VFR BLD AUTO: 28.5 % (ref 42–52)
HGB BLD-MCNC: 8.8 GM/DL (ref 14–18)
MCH RBC QN AUTO: 31.9 PG (ref 26–33)
MCHC RBC AUTO-ENTMCNC: 30.9 GM/DL (ref 32.2–35.5)
MCV RBC AUTO: 103.3 FL (ref 80–94)
PCO2 TEMP ADJ BLDMV: 19 MMHG (ref 41–51)
PH BLDMV: 7.34 [PH] (ref 7.31–7.41)
PHOSPHATE SERPL-MCNC: 7.2 MG/DL (ref 2.4–4.7)
PLATELET # BLD AUTO: 181 THOU/MM3 (ref 130–400)
PMV BLD AUTO: 11.2 FL (ref 9.4–12.4)
PO2 BLDMV: 54 MMHG (ref 25–40)
POTASSIUM SERPL-SCNC: 5.1 MEQ/L (ref 3.5–5.2)
RBC # BLD AUTO: 2.76 MILL/MM3 (ref 4.7–6.1)
SAO2 % BLDMV: 87 %
SODIUM SERPL-SCNC: 134 MEQ/L (ref 135–145)
WBC # BLD AUTO: 9.7 THOU/MM3 (ref 4.8–10.8)

## 2024-01-03 PROCEDURE — 85027 COMPLETE CBC AUTOMATED: CPT

## 2024-01-03 PROCEDURE — 2580000003 HC RX 258: Performed by: INTERNAL MEDICINE

## 2024-01-03 PROCEDURE — 6360000002 HC RX W HCPCS: Performed by: SURGERY

## 2024-01-03 PROCEDURE — 80048 BASIC METABOLIC PNL TOTAL CA: CPT

## 2024-01-03 PROCEDURE — 6370000000 HC RX 637 (ALT 250 FOR IP): Performed by: FAMILY MEDICINE

## 2024-01-03 PROCEDURE — 2500000003 HC RX 250 WO HCPCS: Performed by: INTERNAL MEDICINE

## 2024-01-03 PROCEDURE — 99233 SBSQ HOSP IP/OBS HIGH 50: CPT | Performed by: FAMILY MEDICINE

## 2024-01-03 PROCEDURE — 74018 RADEX ABDOMEN 1 VIEW: CPT

## 2024-01-03 PROCEDURE — 6370000000 HC RX 637 (ALT 250 FOR IP): Performed by: SURGERY

## 2024-01-03 PROCEDURE — 2140000000 HC CCU INTERMEDIATE R&B

## 2024-01-03 PROCEDURE — 82803 BLOOD GASES ANY COMBINATION: CPT

## 2024-01-03 PROCEDURE — 0DH67UZ INSERTION OF FEEDING DEVICE INTO STOMACH, VIA NATURAL OR ARTIFICIAL OPENING: ICD-10-PCS | Performed by: SURGERY

## 2024-01-03 PROCEDURE — 82948 REAGENT STRIP/BLOOD GLUCOSE: CPT

## 2024-01-03 PROCEDURE — 36415 COLL VENOUS BLD VENIPUNCTURE: CPT

## 2024-01-03 PROCEDURE — 99232 SBSQ HOSP IP/OBS MODERATE 35: CPT | Performed by: INTERNAL MEDICINE

## 2024-01-03 PROCEDURE — 84100 ASSAY OF PHOSPHORUS: CPT

## 2024-01-03 PROCEDURE — 2580000003 HC RX 258: Performed by: SURGERY

## 2024-01-03 RX ORDER — INSULIN LISPRO 100 [IU]/ML
0-16 INJECTION, SOLUTION INTRAVENOUS; SUBCUTANEOUS EVERY 6 HOURS
Status: DISCONTINUED | OUTPATIENT
Start: 2024-01-03 | End: 2024-01-05

## 2024-01-03 RX ORDER — INSULIN LISPRO 100 [IU]/ML
6 INJECTION, SOLUTION INTRAVENOUS; SUBCUTANEOUS EVERY 6 HOURS
Status: DISCONTINUED | OUTPATIENT
Start: 2024-01-03 | End: 2024-01-05

## 2024-01-03 RX ORDER — INSULIN GLARGINE 100 [IU]/ML
20 INJECTION, SOLUTION SUBCUTANEOUS NIGHTLY
Status: DISCONTINUED | OUTPATIENT
Start: 2024-01-03 | End: 2024-01-05

## 2024-01-03 RX ADMIN — BUSPIRONE HYDROCHLORIDE 10 MG: 10 TABLET ORAL at 19:01

## 2024-01-03 RX ADMIN — QUETIAPINE FUMARATE 100 MG: 100 TABLET ORAL at 08:01

## 2024-01-03 RX ADMIN — GABAPENTIN 300 MG: 600 TABLET, FILM COATED ORAL at 19:01

## 2024-01-03 RX ADMIN — QUETIAPINE FUMARATE 100 MG: 100 TABLET ORAL at 19:01

## 2024-01-03 RX ADMIN — LEVOTHYROXINE SODIUM 50 MCG: 0.05 TABLET ORAL at 08:05

## 2024-01-03 RX ADMIN — INSULIN LISPRO 6 UNITS: 100 INJECTION, SOLUTION INTRAVENOUS; SUBCUTANEOUS at 08:01

## 2024-01-03 RX ADMIN — PIPERACILLIN SODIUM AND TAZOBACTAM SODIUM 3375 MG: 3; .375 INJECTION, POWDER, LYOPHILIZED, FOR SOLUTION INTRAVENOUS at 15:34

## 2024-01-03 RX ADMIN — INSULIN LISPRO 8 UNITS: 100 INJECTION, SOLUTION INTRAVENOUS; SUBCUTANEOUS at 21:16

## 2024-01-03 RX ADMIN — INSULIN LISPRO 8 UNITS: 100 INJECTION, SOLUTION INTRAVENOUS; SUBCUTANEOUS at 12:55

## 2024-01-03 RX ADMIN — ATORVASTATIN CALCIUM 10 MG: 10 TABLET, FILM COATED ORAL at 19:01

## 2024-01-03 RX ADMIN — SODIUM BICARBONATE: 84 INJECTION, SOLUTION INTRAVENOUS at 10:30

## 2024-01-03 RX ADMIN — PIPERACILLIN SODIUM AND TAZOBACTAM SODIUM 3375 MG: 3; .375 INJECTION, POWDER, LYOPHILIZED, FOR SOLUTION INTRAVENOUS at 03:27

## 2024-01-03 RX ADMIN — INSULIN LISPRO 6 UNITS: 100 INJECTION, SOLUTION INTRAVENOUS; SUBCUTANEOUS at 13:23

## 2024-01-03 RX ADMIN — HYDROMORPHONE HYDROCHLORIDE 0.5 MG: 1 INJECTION, SOLUTION INTRAMUSCULAR; INTRAVENOUS; SUBCUTANEOUS at 05:30

## 2024-01-03 RX ADMIN — SODIUM BICARBONATE: 84 INJECTION, SOLUTION INTRAVENOUS at 17:48

## 2024-01-03 RX ADMIN — HYDROMORPHONE HYDROCHLORIDE 0.5 MG: 1 INJECTION, SOLUTION INTRAMUSCULAR; INTRAVENOUS; SUBCUTANEOUS at 19:07

## 2024-01-03 RX ADMIN — INSULIN GLARGINE 20 UNITS: 100 INJECTION, SOLUTION SUBCUTANEOUS at 21:16

## 2024-01-03 RX ADMIN — BUSPIRONE HYDROCHLORIDE 10 MG: 10 TABLET ORAL at 08:01

## 2024-01-03 RX ADMIN — AMIODARONE HYDROCHLORIDE 100 MG: 200 TABLET ORAL at 08:05

## 2024-01-03 ASSESSMENT — PAIN SCALES - WONG BAKER
WONGBAKER_NUMERICALRESPONSE: 2
WONGBAKER_NUMERICALRESPONSE: 0

## 2024-01-03 ASSESSMENT — PAIN SCALES - GENERAL
PAINLEVEL_OUTOF10: 6
PAINLEVEL_OUTOF10: 6
PAINLEVEL_OUTOF10: 0

## 2024-01-03 ASSESSMENT — PAIN DESCRIPTION - DESCRIPTORS: DESCRIPTORS: ACHING

## 2024-01-03 ASSESSMENT — PAIN - FUNCTIONAL ASSESSMENT: PAIN_FUNCTIONAL_ASSESSMENT: PREVENTS OR INTERFERES WITH MANY ACTIVE NOT PASSIVE ACTIVITIES

## 2024-01-03 ASSESSMENT — PAIN DESCRIPTION - FREQUENCY: FREQUENCY: CONTINUOUS

## 2024-01-03 ASSESSMENT — PAIN DESCRIPTION - ONSET: ONSET: ON-GOING

## 2024-01-03 ASSESSMENT — PAIN DESCRIPTION - ORIENTATION
ORIENTATION: MID
ORIENTATION: MID

## 2024-01-03 ASSESSMENT — PAIN DESCRIPTION - LOCATION
LOCATION: ABDOMEN
LOCATION: ABDOMEN

## 2024-01-03 ASSESSMENT — PAIN DESCRIPTION - PAIN TYPE: TYPE: SURGICAL PAIN

## 2024-01-03 NOTE — OP NOTE
[address] [address]                                OPERATIVE REPORT    PATIENT NAME: NABIL GUALLPA                    :        1950  MED REC NO:   [id number]                           ROOM:       0032  ACCOUNT NO:   [id number]                           ADMIT DATE: 2023  PROVIDER:     Chaitanya Noble MD    DATE OF PROCEDURE:  2024    PREOPERATIVE DIAGNOSES:  Possible perforated appendicitis versus  colitis.    POSTOPERATIVE DIAGNOSES:  Perforated appendicitis with a large right  lower quadrant abscess.    OPERATION  1.  Abdominal exploration.  2.  Drainage of pelvic abscess.  3.  Ileocecectomy.    SURGEON:  Chaitanya Noble MD    ANESTHESIA:  General.    COMPLICATIONS:  None.    BLOOD LOSS:  100 mL.    INDICATIONS FOR PROCEDURE:  The patient is a 73-year-old male who has  been in the hospital for four days with either what was felt to be  colitis or possible perforated appendicitis.  He was maintained on  antibiotics.  He did not improve.  He is brought to surgery for  evaluation.    FINDINGS:  The patient did have a large right lower quadrant pelvic  abscess.  The patient had an apparent perforated appendicitis.  The  appendix was completely obliterated in the inflammatory process.  An  ileocecectomy was performed and drainage of the abscess.    DESCRIPTION OF THE PROCEDURE:  The patient was brought to the operating  suite, placed supine on the operating room table.  After adequate  inhalational anesthesia was administered, the patient's abdomen was  prepped and draped in usual sterile fashion.  Incision was made in the  midline, taken from just below the umbilicus and then around the  umbilicus and taken down through the subcutaneous tissue and the linea  alba, and the abdominal cavity was entered.  On entrance, we did have  some cloudy ascitic fluid, but then we did extend the incision  inferiorly.  The

## 2024-01-03 NOTE — BRIEF OP NOTE
Brief Postoperative Note      Patient: Phi Sanz  YOB: 1950  MRN: [id number]    Date of Procedure: 1/2/2024    Pre-Op Diagnosis Codes:     * Perforated appendix [K35.32]    Post-Op Diagnosis: same with abscess       Procedure Ileocecectomy    Surgeon(s):  Chaitanya Noble MD    Assistant:  * No surgical staff found *    Anesthesia: General    Estimated Blood Loss (mL): 75 ml    Complications: none    Specimens:   ID Type Source Tests Collected by Time Destination   1 : Pelvic Abcess Body Fluid Pelvic Washings CULTURE, ANAEROBIC AND AEROBIC Chaitanya Noble MD 1/2/2024 1751    A : ileocecum Tissue Colon SURGICAL PATHOLOGY Chaitanya Noble MD 1/2/2024 1751        Implants:        Drains:   Closed/Suction Drain RUQ (Active)       Urinary Catheter 01/02/24 Meraz-Temperature (Active)       Findings: see op note  This procedure was not performed to treat colon cancer through resection      Electronically signed by Chaitanya Noble MD on 1/2/2024 at 7:21 PM

## 2024-01-03 NOTE — ANESTHESIA POSTPROCEDURE EVALUATION
Department of Anesthesiology  Postprocedure Note    Patient: Phi Sanz  MRN: [id number]  YOB: 1950  Date of evaluation: 1/2/2024    Procedure Summary       Date: 01/02/24 Room / Location: [facility] OR 01 / [facility] OR    Anesthesia Start: 1718 Anesthesia Stop: 1941    Procedure: Right Colon Resection, ileocecectomy appendectomy (Abdomen) Diagnosis:       Perforated appendix      (Perforated appendix [K35.32])    Surgeons: Chaitanya Noble MD Responsible Provider: Jas Rendon MD    Anesthesia Type: general ASA Status: 4            Anesthesia Type: No value filed.    Evan Phase I: Evan Score: 5    Evan Phase II:      Anesthesia Post Evaluation    Patient location during evaluation: PACU  Patient participation: complete - patient participated  Level of consciousness: awake and alert  Airway patency: patent  Nausea & Vomiting: no nausea  Cardiovascular status: blood pressure returned to baseline and hemodynamically stable  Respiratory status: acceptable and spontaneous ventilation  Hydration status: euvolemic  Pain management: adequate    No notable events documented.

## 2024-01-04 ENCOUNTER — APPOINTMENT (OUTPATIENT)
Age: 74
DRG: 329 | End: 2024-01-04
Attending: INTERNAL MEDICINE
Payer: MEDICARE

## 2024-01-04 LAB
ANION GAP SERPL CALC-SCNC: 13 MEQ/L (ref 8–16)
BACTERIA BLD AEROBE CULT: NORMAL
BACTERIA BLD AEROBE CULT: NORMAL
BUN SERPL-MCNC: 67 MG/DL (ref 7–22)
CALCIUM SERPL-MCNC: 7.5 MG/DL (ref 8.5–10.5)
CHLORIDE SERPL-SCNC: 98 MEQ/L (ref 98–111)
CO2 SERPL-SCNC: 23 MEQ/L (ref 23–33)
CREAT SERPL-MCNC: 6.3 MG/DL (ref 0.4–1.2)
DEPRECATED RDW RBC AUTO: 47.5 FL (ref 35–45)
ECHO AO ASC DIAM: 3 CM
ECHO AO ASCENDING AORTA INDEX: 1.5 CM/M2
ECHO AV ACCELERATION TIME: 71 MS
ECHO AV AREA PEAK VELOCITY: 1 CM2
ECHO AV AREA VTI: 1 CM2
ECHO AV AREA/BSA PEAK VELOCITY: 0.5 CM2/M2
ECHO AV AREA/BSA VTI: 0.5 CM2/M2
ECHO AV CUSP MM: 1.2 CM
ECHO AV MEAN GRADIENT: 30 MMHG
ECHO AV MEAN VELOCITY: 2.4 M/S
ECHO AV PEAK GRADIENT: 50 MMHG
ECHO AV PEAK VELOCITY: 3.8 M/S
ECHO AV VELOCITY RATIO: 0.26
ECHO AV VTI: 75.9 CM
ECHO LA AREA 2C: 13.8 CM2
ECHO LA AREA 4C: 11.6 CM2
ECHO LA DIAMETER INDEX: 1.4 CM/M2
ECHO LA DIAMETER: 2.8 CM
ECHO LA MAJOR AXIS: 4.1 CM
ECHO LA MINOR AXIS: 4.7 CM
ECHO LA VOL BP: 32 ML (ref 18–58)
ECHO LA VOL MOD A2C: 36 ML (ref 18–58)
ECHO LA VOL MOD A4C: 26 ML (ref 18–58)
ECHO LA VOL/BSA BIPLANE: 16 ML/M2 (ref 16–34)
ECHO LA VOLUME INDEX MOD A2C: 18 ML/M2 (ref 16–34)
ECHO LA VOLUME INDEX MOD A4C: 13 ML/M2 (ref 16–34)
ECHO LV E' LATERAL VELOCITY: 11 CM/S
ECHO LV E' SEPTAL VELOCITY: 9 CM/S
ECHO LV FRACTIONAL SHORTENING: 34 % (ref 28–44)
ECHO LV INTERNAL DIMENSION DIASTOLE INDEX: 2.5 CM/M2
ECHO LV INTERNAL DIMENSION DIASTOLIC: 5 CM (ref 4.2–5.9)
ECHO LV INTERNAL DIMENSION SYSTOLIC INDEX: 1.65 CM/M2
ECHO LV INTERNAL DIMENSION SYSTOLIC: 3.3 CM
ECHO LV ISOVOLUMETRIC RELAXATION TIME (IVRT): 77 MS
ECHO LV IVSD: 1 CM (ref 0.6–1)
ECHO LV MASS 2D: 169.9 G (ref 88–224)
ECHO LV MASS INDEX 2D: 85 G/M2 (ref 49–115)
ECHO LV POSTERIOR WALL DIASTOLIC: 0.9 CM (ref 0.6–1)
ECHO LV RELATIVE WALL THICKNESS RATIO: 0.36
ECHO LVOT AREA: 3.1 CM2
ECHO LVOT AV VTI INDEX: 0.29
ECHO LVOT DIAM: 2 CM
ECHO LVOT MEAN GRADIENT: 2 MMHG
ECHO LVOT PEAK GRADIENT: 4 MMHG
ECHO LVOT PEAK VELOCITY: 1 M/S
ECHO LVOT STROKE VOLUME INDEX: 35 ML/M2
ECHO LVOT SV: 70 ML
ECHO LVOT VTI: 22.3 CM
ECHO MV A VELOCITY: 1.25 M/S
ECHO MV E DECELERATION TIME (DT): 190 MS
ECHO MV E VELOCITY: 1.01 M/S
ECHO MV E/A RATIO: 0.81
ECHO MV E/E' LATERAL: 9.18
ECHO MV E/E' RATIO (AVERAGED): 10.2
ECHO PV MAX VELOCITY: 0.9 M/S
ECHO PV PEAK GRADIENT: 3 MMHG
ECHO RV INTERNAL DIMENSION: 2.7 CM
ECHO RV TAPSE: 2.1 CM (ref 1.7–?)
ECHO TV E WAVE: 0.6 M/S
ECHO TV REGURGITANT MAX VELOCITY: 2.77 M/S
ECHO TV REGURGITANT PEAK GRADIENT: 31 MMHG
ERYTHROCYTE [DISTWIDTH] IN BLOOD BY AUTOMATED COUNT: 13.2 % (ref 11.5–14.5)
GFR SERPL CREATININE-BSD FRML MDRD: 9 ML/MIN/1.73M2
GLUCOSE BLD STRIP.AUTO-MCNC: 123 MG/DL (ref 70–108)
GLUCOSE BLD STRIP.AUTO-MCNC: 155 MG/DL (ref 70–108)
GLUCOSE BLD STRIP.AUTO-MCNC: 165 MG/DL (ref 70–108)
GLUCOSE BLD STRIP.AUTO-MCNC: 244 MG/DL (ref 70–108)
GLUCOSE SERPL-MCNC: 228 MG/DL (ref 70–108)
HCT VFR BLD AUTO: 23 % (ref 42–52)
HCT VFR BLD AUTO: 24.5 % (ref 42–52)
HGB BLD-MCNC: 7.5 GM/DL (ref 14–18)
HGB BLD-MCNC: 8.1 GM/DL (ref 14–18)
MCH RBC QN AUTO: 32.2 PG (ref 26–33)
MCHC RBC AUTO-ENTMCNC: 32.6 GM/DL (ref 32.2–35.5)
MCV RBC AUTO: 98.7 FL (ref 80–94)
PHOSPHATE SERPL-MCNC: 5.6 MG/DL (ref 2.4–4.7)
PLATELET # BLD AUTO: 198 THOU/MM3 (ref 130–400)
PMV BLD AUTO: 10.7 FL (ref 9.4–12.4)
POTASSIUM SERPL-SCNC: 3.6 MEQ/L (ref 3.5–5.2)
RBC # BLD AUTO: 2.33 MILL/MM3 (ref 4.7–6.1)
SODIUM SERPL-SCNC: 134 MEQ/L (ref 135–145)
WBC # BLD AUTO: 8.9 THOU/MM3 (ref 4.8–10.8)

## 2024-01-04 PROCEDURE — 99232 SBSQ HOSP IP/OBS MODERATE 35: CPT | Performed by: INTERNAL MEDICINE

## 2024-01-04 PROCEDURE — 99232 SBSQ HOSP IP/OBS MODERATE 35: CPT | Performed by: FAMILY MEDICINE

## 2024-01-04 PROCEDURE — 93306 TTE W/DOPPLER COMPLETE: CPT

## 2024-01-04 PROCEDURE — 85027 COMPLETE CBC AUTOMATED: CPT

## 2024-01-04 PROCEDURE — 6360000002 HC RX W HCPCS: Performed by: SURGERY

## 2024-01-04 PROCEDURE — 36415 COLL VENOUS BLD VENIPUNCTURE: CPT

## 2024-01-04 PROCEDURE — 82948 REAGENT STRIP/BLOOD GLUCOSE: CPT

## 2024-01-04 PROCEDURE — 93306 TTE W/DOPPLER COMPLETE: CPT | Performed by: INTERNAL MEDICINE

## 2024-01-04 PROCEDURE — 80048 BASIC METABOLIC PNL TOTAL CA: CPT

## 2024-01-04 PROCEDURE — 84100 ASSAY OF PHOSPHORUS: CPT

## 2024-01-04 PROCEDURE — 2580000003 HC RX 258: Performed by: INTERNAL MEDICINE

## 2024-01-04 PROCEDURE — 85014 HEMATOCRIT: CPT

## 2024-01-04 PROCEDURE — 85018 HEMOGLOBIN: CPT

## 2024-01-04 PROCEDURE — 6370000000 HC RX 637 (ALT 250 FOR IP): Performed by: SURGERY

## 2024-01-04 PROCEDURE — 6370000000 HC RX 637 (ALT 250 FOR IP): Performed by: FAMILY MEDICINE

## 2024-01-04 PROCEDURE — 2580000003 HC RX 258: Performed by: SURGERY

## 2024-01-04 PROCEDURE — 2140000000 HC CCU INTERMEDIATE R&B

## 2024-01-04 RX ORDER — SODIUM CHLORIDE 9 MG/ML
INJECTION, SOLUTION INTRAVENOUS CONTINUOUS
Status: DISCONTINUED | OUTPATIENT
Start: 2024-01-04 | End: 2024-01-07

## 2024-01-04 RX ADMIN — INSULIN GLARGINE 20 UNITS: 100 INJECTION, SOLUTION SUBCUTANEOUS at 22:02

## 2024-01-04 RX ADMIN — SODIUM CHLORIDE: 9 INJECTION, SOLUTION INTRAVENOUS at 16:57

## 2024-01-04 RX ADMIN — LEVOTHYROXINE SODIUM 50 MCG: 0.05 TABLET ORAL at 07:59

## 2024-01-04 RX ADMIN — BUSPIRONE HYDROCHLORIDE 10 MG: 10 TABLET ORAL at 08:00

## 2024-01-04 RX ADMIN — PIPERACILLIN SODIUM AND TAZOBACTAM SODIUM 3375 MG: 3; .375 INJECTION, POWDER, LYOPHILIZED, FOR SOLUTION INTRAVENOUS at 15:12

## 2024-01-04 RX ADMIN — PIPERACILLIN SODIUM AND TAZOBACTAM SODIUM 3375 MG: 3; .375 INJECTION, POWDER, LYOPHILIZED, FOR SOLUTION INTRAVENOUS at 04:20

## 2024-01-04 RX ADMIN — QUETIAPINE FUMARATE 100 MG: 100 TABLET ORAL at 19:07

## 2024-01-04 RX ADMIN — SODIUM CHLORIDE: 9 INJECTION, SOLUTION INTRAVENOUS at 07:01

## 2024-01-04 RX ADMIN — HYDROMORPHONE HYDROCHLORIDE 0.5 MG: 1 INJECTION, SOLUTION INTRAMUSCULAR; INTRAVENOUS; SUBCUTANEOUS at 22:04

## 2024-01-04 RX ADMIN — HYDROMORPHONE HYDROCHLORIDE 0.5 MG: 1 INJECTION, SOLUTION INTRAMUSCULAR; INTRAVENOUS; SUBCUTANEOUS at 15:08

## 2024-01-04 RX ADMIN — ATORVASTATIN CALCIUM 10 MG: 10 TABLET, FILM COATED ORAL at 19:07

## 2024-01-04 RX ADMIN — GABAPENTIN 300 MG: 600 TABLET, FILM COATED ORAL at 19:07

## 2024-01-04 RX ADMIN — AMIODARONE HYDROCHLORIDE 100 MG: 200 TABLET ORAL at 07:59

## 2024-01-04 RX ADMIN — INSULIN LISPRO 4 UNITS: 100 INJECTION, SOLUTION INTRAVENOUS; SUBCUTANEOUS at 03:25

## 2024-01-04 RX ADMIN — HYDROMORPHONE HYDROCHLORIDE 0.5 MG: 1 INJECTION, SOLUTION INTRAMUSCULAR; INTRAVENOUS; SUBCUTANEOUS at 07:56

## 2024-01-04 RX ADMIN — QUETIAPINE FUMARATE 100 MG: 100 TABLET ORAL at 07:59

## 2024-01-04 RX ADMIN — BUSPIRONE HYDROCHLORIDE 10 MG: 10 TABLET ORAL at 19:07

## 2024-01-04 RX ADMIN — INSULIN LISPRO 6 UNITS: 100 INJECTION, SOLUTION INTRAVENOUS; SUBCUTANEOUS at 03:25

## 2024-01-04 ASSESSMENT — PAIN DESCRIPTION - ONSET
ONSET: ON-GOING
ONSET: ON-GOING

## 2024-01-04 ASSESSMENT — PAIN - FUNCTIONAL ASSESSMENT
PAIN_FUNCTIONAL_ASSESSMENT: PREVENTS OR INTERFERES SOME ACTIVE ACTIVITIES AND ADLS
PAIN_FUNCTIONAL_ASSESSMENT: PREVENTS OR INTERFERES SOME ACTIVE ACTIVITIES AND ADLS

## 2024-01-04 ASSESSMENT — PAIN DESCRIPTION - DESCRIPTORS
DESCRIPTORS: ACHING
DESCRIPTORS: SHARP
DESCRIPTORS: ACHING

## 2024-01-04 ASSESSMENT — PAIN SCALES - GENERAL
PAINLEVEL_OUTOF10: 2
PAINLEVEL_OUTOF10: 3
PAINLEVEL_OUTOF10: 5
PAINLEVEL_OUTOF10: 6
PAINLEVEL_OUTOF10: 6

## 2024-01-04 ASSESSMENT — PAIN DESCRIPTION - ORIENTATION
ORIENTATION: LOWER
ORIENTATION: MID
ORIENTATION: LOWER

## 2024-01-04 ASSESSMENT — PAIN DESCRIPTION - PAIN TYPE
TYPE: SURGICAL PAIN
TYPE: SURGICAL PAIN

## 2024-01-04 ASSESSMENT — PAIN DESCRIPTION - LOCATION
LOCATION: ABDOMEN

## 2024-01-04 ASSESSMENT — PAIN DESCRIPTION - FREQUENCY
FREQUENCY: CONTINUOUS
FREQUENCY: INTERMITTENT

## 2024-01-04 ASSESSMENT — PAIN DESCRIPTION - DIRECTION: RADIATING_TOWARDS: MA

## 2024-01-05 LAB
ANION GAP SERPL CALC-SCNC: 13 MEQ/L (ref 8–16)
BACTERIA SPEC AEROBE CULT: ABNORMAL
BACTERIA SPEC AEROBE CULT: ABNORMAL
BACTERIA SPEC ANAEROBE CULT: ABNORMAL
BUN SERPL-MCNC: 60 MG/DL (ref 7–22)
CA-I BLD ISE-SCNC: 1.04 MMOL/L (ref 1.12–1.32)
CALCIUM SERPL-MCNC: 7.5 MG/DL (ref 8.5–10.5)
CHLORIDE SERPL-SCNC: 105 MEQ/L (ref 98–111)
CO2 SERPL-SCNC: 23 MEQ/L (ref 23–33)
CREAT SERPL-MCNC: 5.6 MG/DL (ref 0.4–1.2)
DEPRECATED RDW RBC AUTO: 48.1 FL (ref 35–45)
ERYTHROCYTE [DISTWIDTH] IN BLOOD BY AUTOMATED COUNT: 13 % (ref 11.5–14.5)
GFR SERPL CREATININE-BSD FRML MDRD: 10 ML/MIN/1.73M2
GLUCOSE BLD STRIP.AUTO-MCNC: 109 MG/DL (ref 70–108)
GLUCOSE BLD STRIP.AUTO-MCNC: 122 MG/DL (ref 70–108)
GLUCOSE BLD STRIP.AUTO-MCNC: 128 MG/DL (ref 70–108)
GLUCOSE BLD STRIP.AUTO-MCNC: 185 MG/DL (ref 70–108)
GLUCOSE BLD STRIP.AUTO-MCNC: 67 MG/DL (ref 70–108)
GLUCOSE SERPL-MCNC: 97 MG/DL (ref 70–108)
GRAM STN SPEC: ABNORMAL
HCT VFR BLD AUTO: 24.4 % (ref 42–52)
HGB BLD-MCNC: 7.8 GM/DL (ref 14–18)
MCH RBC QN AUTO: 32.4 PG (ref 26–33)
MCHC RBC AUTO-ENTMCNC: 32 GM/DL (ref 32.2–35.5)
MCV RBC AUTO: 101.2 FL (ref 80–94)
ORGANISM: ABNORMAL
ORGANISM: ABNORMAL
PHOSPHATE SERPL-MCNC: 4.6 MG/DL (ref 2.4–4.7)
PLATELET # BLD AUTO: 200 THOU/MM3 (ref 130–400)
PMV BLD AUTO: 10.4 FL (ref 9.4–12.4)
POTASSIUM SERPL-SCNC: 3.4 MEQ/L (ref 3.5–5.2)
RBC # BLD AUTO: 2.41 MILL/MM3 (ref 4.7–6.1)
SODIUM SERPL-SCNC: 141 MEQ/L (ref 135–145)
WBC # BLD AUTO: 7.7 THOU/MM3 (ref 4.8–10.8)

## 2024-01-05 PROCEDURE — 6370000000 HC RX 637 (ALT 250 FOR IP): Performed by: FAMILY MEDICINE

## 2024-01-05 PROCEDURE — 6360000002 HC RX W HCPCS: Performed by: SURGERY

## 2024-01-05 PROCEDURE — 85027 COMPLETE CBC AUTOMATED: CPT

## 2024-01-05 PROCEDURE — 97162 PT EVAL MOD COMPLEX 30 MIN: CPT

## 2024-01-05 PROCEDURE — 2580000003 HC RX 258: Performed by: SURGERY

## 2024-01-05 PROCEDURE — 97166 OT EVAL MOD COMPLEX 45 MIN: CPT

## 2024-01-05 PROCEDURE — 36415 COLL VENOUS BLD VENIPUNCTURE: CPT

## 2024-01-05 PROCEDURE — 82948 REAGENT STRIP/BLOOD GLUCOSE: CPT

## 2024-01-05 PROCEDURE — 6370000000 HC RX 637 (ALT 250 FOR IP): Performed by: INTERNAL MEDICINE

## 2024-01-05 PROCEDURE — 2140000000 HC CCU INTERMEDIATE R&B

## 2024-01-05 PROCEDURE — 84100 ASSAY OF PHOSPHORUS: CPT

## 2024-01-05 PROCEDURE — 97110 THERAPEUTIC EXERCISES: CPT

## 2024-01-05 PROCEDURE — 99233 SBSQ HOSP IP/OBS HIGH 50: CPT | Performed by: FAMILY MEDICINE

## 2024-01-05 PROCEDURE — 6370000000 HC RX 637 (ALT 250 FOR IP): Performed by: SURGERY

## 2024-01-05 PROCEDURE — 99232 SBSQ HOSP IP/OBS MODERATE 35: CPT | Performed by: INTERNAL MEDICINE

## 2024-01-05 PROCEDURE — 51798 US URINE CAPACITY MEASURE: CPT

## 2024-01-05 PROCEDURE — 82330 ASSAY OF CALCIUM: CPT

## 2024-01-05 PROCEDURE — 80048 BASIC METABOLIC PNL TOTAL CA: CPT

## 2024-01-05 PROCEDURE — 97530 THERAPEUTIC ACTIVITIES: CPT

## 2024-01-05 PROCEDURE — 2580000003 HC RX 258: Performed by: INTERNAL MEDICINE

## 2024-01-05 RX ORDER — INSULIN LISPRO 100 [IU]/ML
0-8 INJECTION, SOLUTION INTRAVENOUS; SUBCUTANEOUS
Status: DISCONTINUED | OUTPATIENT
Start: 2024-01-05 | End: 2024-01-16 | Stop reason: HOSPADM

## 2024-01-05 RX ORDER — OXYCODONE HYDROCHLORIDE AND ACETAMINOPHEN 5; 325 MG/1; MG/1
1 TABLET ORAL EVERY 4 HOURS PRN
Status: DISCONTINUED | OUTPATIENT
Start: 2024-01-05 | End: 2024-01-16 | Stop reason: HOSPADM

## 2024-01-05 RX ORDER — INSULIN LISPRO 100 [IU]/ML
0-4 INJECTION, SOLUTION INTRAVENOUS; SUBCUTANEOUS NIGHTLY
Status: DISCONTINUED | OUTPATIENT
Start: 2024-01-05 | End: 2024-01-16 | Stop reason: HOSPADM

## 2024-01-05 RX ORDER — INSULIN GLARGINE 100 [IU]/ML
15 INJECTION, SOLUTION SUBCUTANEOUS NIGHTLY
Status: DISCONTINUED | OUTPATIENT
Start: 2024-01-05 | End: 2024-01-08

## 2024-01-05 RX ADMIN — AMIODARONE HYDROCHLORIDE 100 MG: 200 TABLET ORAL at 10:37

## 2024-01-05 RX ADMIN — SODIUM CHLORIDE: 9 INJECTION, SOLUTION INTRAVENOUS at 12:11

## 2024-01-05 RX ADMIN — OXYCODONE HYDROCHLORIDE AND ACETAMINOPHEN 1 TABLET: 5; 325 TABLET ORAL at 20:59

## 2024-01-05 RX ADMIN — PIPERACILLIN SODIUM AND TAZOBACTAM SODIUM 3375 MG: 3; .375 INJECTION, POWDER, LYOPHILIZED, FOR SOLUTION INTRAVENOUS at 17:37

## 2024-01-05 RX ADMIN — HYDROMORPHONE HYDROCHLORIDE 0.5 MG: 1 INJECTION, SOLUTION INTRAMUSCULAR; INTRAVENOUS; SUBCUTANEOUS at 08:12

## 2024-01-05 RX ADMIN — POTASSIUM BICARBONATE 40 MEQ: 782 TABLET, EFFERVESCENT ORAL at 10:36

## 2024-01-05 RX ADMIN — QUETIAPINE FUMARATE 100 MG: 100 TABLET ORAL at 10:37

## 2024-01-05 RX ADMIN — LEVOTHYROXINE SODIUM 50 MCG: 0.05 TABLET ORAL at 05:47

## 2024-01-05 RX ADMIN — QUETIAPINE FUMARATE 100 MG: 100 TABLET ORAL at 20:52

## 2024-01-05 RX ADMIN — GABAPENTIN 300 MG: 600 TABLET, FILM COATED ORAL at 20:52

## 2024-01-05 RX ADMIN — BUSPIRONE HYDROCHLORIDE 10 MG: 10 TABLET ORAL at 20:52

## 2024-01-05 RX ADMIN — INSULIN GLARGINE 15 UNITS: 100 INJECTION, SOLUTION SUBCUTANEOUS at 20:59

## 2024-01-05 RX ADMIN — PIPERACILLIN SODIUM AND TAZOBACTAM SODIUM 3375 MG: 3; .375 INJECTION, POWDER, LYOPHILIZED, FOR SOLUTION INTRAVENOUS at 03:46

## 2024-01-05 RX ADMIN — ATORVASTATIN CALCIUM 10 MG: 10 TABLET, FILM COATED ORAL at 20:52

## 2024-01-05 RX ADMIN — BUSPIRONE HYDROCHLORIDE 10 MG: 10 TABLET ORAL at 10:37

## 2024-01-05 ASSESSMENT — PAIN SCALES - GENERAL
PAINLEVEL_OUTOF10: 3
PAINLEVEL_OUTOF10: 6
PAINLEVEL_OUTOF10: 4
PAINLEVEL_OUTOF10: 6

## 2024-01-05 ASSESSMENT — PAIN DESCRIPTION - LOCATION
LOCATION: ABDOMEN
LOCATION: ABDOMEN

## 2024-01-05 ASSESSMENT — PAIN DESCRIPTION - FREQUENCY: FREQUENCY: CONTINUOUS

## 2024-01-05 ASSESSMENT — PAIN DESCRIPTION - DESCRIPTORS
DESCRIPTORS: NAGGING
DESCRIPTORS: ACHING;DISCOMFORT

## 2024-01-05 ASSESSMENT — PAIN DESCRIPTION - ONSET: ONSET: ON-GOING

## 2024-01-05 ASSESSMENT — PAIN DESCRIPTION - PAIN TYPE: TYPE: SURGICAL PAIN

## 2024-01-05 ASSESSMENT — PAIN - FUNCTIONAL ASSESSMENT: PAIN_FUNCTIONAL_ASSESSMENT: PREVENTS OR INTERFERES SOME ACTIVE ACTIVITIES AND ADLS

## 2024-01-05 ASSESSMENT — PAIN DESCRIPTION - ORIENTATION
ORIENTATION: MID
ORIENTATION: MID

## 2024-01-06 LAB
ANION GAP SERPL CALC-SCNC: 12 MEQ/L (ref 8–16)
BUN SERPL-MCNC: 50 MG/DL (ref 7–22)
CA-I BLD ISE-SCNC: 1.08 MMOL/L (ref 1.12–1.32)
CALCIUM SERPL-MCNC: 7.7 MG/DL (ref 8.5–10.5)
CHLORIDE SERPL-SCNC: 105 MEQ/L (ref 98–111)
CO2 SERPL-SCNC: 21 MEQ/L (ref 23–33)
CREAT SERPL-MCNC: 5 MG/DL (ref 0.4–1.2)
DEPRECATED RDW RBC AUTO: 50 FL (ref 35–45)
ERYTHROCYTE [DISTWIDTH] IN BLOOD BY AUTOMATED COUNT: 13.1 % (ref 11.5–14.5)
GFR SERPL CREATININE-BSD FRML MDRD: 12 ML/MIN/1.73M2
GLUCOSE BLD STRIP.AUTO-MCNC: 111 MG/DL (ref 70–108)
GLUCOSE BLD STRIP.AUTO-MCNC: 133 MG/DL (ref 70–108)
GLUCOSE BLD STRIP.AUTO-MCNC: 160 MG/DL (ref 70–108)
GLUCOSE BLD STRIP.AUTO-MCNC: 69 MG/DL (ref 70–108)
GLUCOSE BLD STRIP.AUTO-MCNC: 75 MG/DL (ref 70–108)
GLUCOSE SERPL-MCNC: 101 MG/DL (ref 70–108)
HCT VFR BLD AUTO: 28.5 % (ref 42–52)
HGB BLD-MCNC: 8.7 GM/DL (ref 14–18)
MAGNESIUM SERPL-MCNC: 2 MG/DL (ref 1.6–2.4)
MCH RBC QN AUTO: 32.1 PG (ref 26–33)
MCHC RBC AUTO-ENTMCNC: 30.5 GM/DL (ref 32.2–35.5)
MCV RBC AUTO: 105.2 FL (ref 80–94)
PHOSPHATE SERPL-MCNC: 4 MG/DL (ref 2.4–4.7)
PLATELET # BLD AUTO: 210 THOU/MM3 (ref 130–400)
PMV BLD AUTO: 10.3 FL (ref 9.4–12.4)
POTASSIUM SERPL-SCNC: 3.7 MEQ/L (ref 3.5–5.2)
RBC # BLD AUTO: 2.71 MILL/MM3 (ref 4.7–6.1)
SODIUM SERPL-SCNC: 138 MEQ/L (ref 135–145)
WBC # BLD AUTO: 8.2 THOU/MM3 (ref 4.8–10.8)

## 2024-01-06 PROCEDURE — 6370000000 HC RX 637 (ALT 250 FOR IP): Performed by: SURGERY

## 2024-01-06 PROCEDURE — 83735 ASSAY OF MAGNESIUM: CPT

## 2024-01-06 PROCEDURE — 85027 COMPLETE CBC AUTOMATED: CPT

## 2024-01-06 PROCEDURE — 6360000002 HC RX W HCPCS: Performed by: SURGERY

## 2024-01-06 PROCEDURE — 82948 REAGENT STRIP/BLOOD GLUCOSE: CPT

## 2024-01-06 PROCEDURE — 99232 SBSQ HOSP IP/OBS MODERATE 35: CPT | Performed by: INTERNAL MEDICINE

## 2024-01-06 PROCEDURE — 80048 BASIC METABOLIC PNL TOTAL CA: CPT

## 2024-01-06 PROCEDURE — 36415 COLL VENOUS BLD VENIPUNCTURE: CPT

## 2024-01-06 PROCEDURE — 2580000003 HC RX 258: Performed by: SURGERY

## 2024-01-06 PROCEDURE — 2580000003 HC RX 258: Performed by: INTERNAL MEDICINE

## 2024-01-06 PROCEDURE — 84100 ASSAY OF PHOSPHORUS: CPT

## 2024-01-06 PROCEDURE — 82330 ASSAY OF CALCIUM: CPT

## 2024-01-06 PROCEDURE — 2140000000 HC CCU INTERMEDIATE R&B

## 2024-01-06 RX ADMIN — SODIUM CHLORIDE, PRESERVATIVE FREE 10 ML: 5 INJECTION INTRAVENOUS at 20:51

## 2024-01-06 RX ADMIN — QUETIAPINE FUMARATE 100 MG: 100 TABLET ORAL at 20:51

## 2024-01-06 RX ADMIN — SODIUM CHLORIDE: 9 INJECTION, SOLUTION INTRAVENOUS at 00:30

## 2024-01-06 RX ADMIN — SODIUM CHLORIDE, PRESERVATIVE FREE 10 ML: 5 INJECTION INTRAVENOUS at 09:16

## 2024-01-06 RX ADMIN — BUSPIRONE HYDROCHLORIDE 10 MG: 10 TABLET ORAL at 09:16

## 2024-01-06 RX ADMIN — SODIUM CHLORIDE: 9 INJECTION, SOLUTION INTRAVENOUS at 22:41

## 2024-01-06 RX ADMIN — GABAPENTIN 300 MG: 600 TABLET, FILM COATED ORAL at 20:51

## 2024-01-06 RX ADMIN — OXYCODONE HYDROCHLORIDE AND ACETAMINOPHEN 1 TABLET: 5; 325 TABLET ORAL at 23:49

## 2024-01-06 RX ADMIN — PIPERACILLIN SODIUM AND TAZOBACTAM SODIUM 3375 MG: 3; .375 INJECTION, POWDER, LYOPHILIZED, FOR SOLUTION INTRAVENOUS at 16:40

## 2024-01-06 RX ADMIN — AMIODARONE HYDROCHLORIDE 100 MG: 200 TABLET ORAL at 09:16

## 2024-01-06 RX ADMIN — QUETIAPINE FUMARATE 100 MG: 100 TABLET ORAL at 09:16

## 2024-01-06 RX ADMIN — LEVOTHYROXINE SODIUM 50 MCG: 0.05 TABLET ORAL at 05:54

## 2024-01-06 RX ADMIN — BUSPIRONE HYDROCHLORIDE 10 MG: 10 TABLET ORAL at 20:51

## 2024-01-06 RX ADMIN — PIPERACILLIN SODIUM AND TAZOBACTAM SODIUM 3375 MG: 3; .375 INJECTION, POWDER, LYOPHILIZED, FOR SOLUTION INTRAVENOUS at 04:29

## 2024-01-06 RX ADMIN — OXYCODONE HYDROCHLORIDE AND ACETAMINOPHEN 1 TABLET: 5; 325 TABLET ORAL at 17:56

## 2024-01-06 RX ADMIN — ATORVASTATIN CALCIUM 10 MG: 10 TABLET, FILM COATED ORAL at 20:51

## 2024-01-06 ASSESSMENT — PAIN - FUNCTIONAL ASSESSMENT: PAIN_FUNCTIONAL_ASSESSMENT: PREVENTS OR INTERFERES SOME ACTIVE ACTIVITIES AND ADLS

## 2024-01-06 ASSESSMENT — PAIN DESCRIPTION - FREQUENCY: FREQUENCY: CONTINUOUS

## 2024-01-06 ASSESSMENT — PAIN DESCRIPTION - ONSET: ONSET: ON-GOING

## 2024-01-06 ASSESSMENT — PAIN DESCRIPTION - DESCRIPTORS
DESCRIPTORS: ACHING
DESCRIPTORS: DISCOMFORT;ACHING

## 2024-01-06 ASSESSMENT — PAIN DESCRIPTION - PAIN TYPE
TYPE: SURGICAL PAIN
TYPE: SURGICAL PAIN

## 2024-01-06 ASSESSMENT — PAIN SCALES - GENERAL
PAINLEVEL_OUTOF10: 0
PAINLEVEL_OUTOF10: 4
PAINLEVEL_OUTOF10: 6

## 2024-01-06 ASSESSMENT — PAIN DESCRIPTION - ORIENTATION
ORIENTATION: MID;RIGHT
ORIENTATION: MID

## 2024-01-06 ASSESSMENT — PAIN DESCRIPTION - LOCATION
LOCATION: ABDOMEN
LOCATION: ABDOMEN

## 2024-01-06 NOTE — PROCEDURES
Bladder scan was completed by J Leopold at 1835 pm. The residual amount of 36 ml was resulted to Cody ABURTO.

## 2024-01-07 LAB
ANION GAP SERPL CALC-SCNC: 12 MEQ/L (ref 8–16)
BUN SERPL-MCNC: 39 MG/DL (ref 7–22)
CALCIUM SERPL-MCNC: 7.3 MG/DL (ref 8.5–10.5)
CHLORIDE SERPL-SCNC: 107 MEQ/L (ref 98–111)
CO2 SERPL-SCNC: 21 MEQ/L (ref 23–33)
CREAT SERPL-MCNC: 4.2 MG/DL (ref 0.4–1.2)
DEPRECATED RDW RBC AUTO: 50 FL (ref 35–45)
ERYTHROCYTE [DISTWIDTH] IN BLOOD BY AUTOMATED COUNT: 12.8 % (ref 11.5–14.5)
GFR SERPL CREATININE-BSD FRML MDRD: 14 ML/MIN/1.73M2
GLUCOSE BLD STRIP.AUTO-MCNC: 114 MG/DL (ref 70–108)
GLUCOSE BLD STRIP.AUTO-MCNC: 172 MG/DL (ref 70–108)
GLUCOSE BLD STRIP.AUTO-MCNC: 204 MG/DL (ref 70–108)
GLUCOSE BLD STRIP.AUTO-MCNC: 213 MG/DL (ref 70–108)
GLUCOSE SERPL-MCNC: 120 MG/DL (ref 70–108)
HCT VFR BLD AUTO: 24.8 % (ref 42–52)
HGB BLD-MCNC: 7.6 GM/DL (ref 14–18)
MAGNESIUM SERPL-MCNC: 1.6 MG/DL (ref 1.6–2.4)
MCH RBC QN AUTO: 32.5 PG (ref 26–33)
MCHC RBC AUTO-ENTMCNC: 30.6 GM/DL (ref 32.2–35.5)
MCV RBC AUTO: 106 FL (ref 80–94)
PHOSPHATE SERPL-MCNC: 3.7 MG/DL (ref 2.4–4.7)
PLATELET # BLD AUTO: 209 THOU/MM3 (ref 130–400)
PMV BLD AUTO: 10.3 FL (ref 9.4–12.4)
POTASSIUM SERPL-SCNC: 3.9 MEQ/L (ref 3.5–5.2)
RBC # BLD AUTO: 2.34 MILL/MM3 (ref 4.7–6.1)
SODIUM SERPL-SCNC: 140 MEQ/L (ref 135–145)
WBC # BLD AUTO: 7.8 THOU/MM3 (ref 4.8–10.8)

## 2024-01-07 PROCEDURE — 36415 COLL VENOUS BLD VENIPUNCTURE: CPT

## 2024-01-07 PROCEDURE — 80048 BASIC METABOLIC PNL TOTAL CA: CPT

## 2024-01-07 PROCEDURE — 6370000000 HC RX 637 (ALT 250 FOR IP): Performed by: FAMILY MEDICINE

## 2024-01-07 PROCEDURE — 84100 ASSAY OF PHOSPHORUS: CPT

## 2024-01-07 PROCEDURE — 99232 SBSQ HOSP IP/OBS MODERATE 35: CPT | Performed by: INTERNAL MEDICINE

## 2024-01-07 PROCEDURE — 85027 COMPLETE CBC AUTOMATED: CPT

## 2024-01-07 PROCEDURE — 6370000000 HC RX 637 (ALT 250 FOR IP): Performed by: SURGERY

## 2024-01-07 PROCEDURE — 2580000003 HC RX 258: Performed by: SURGERY

## 2024-01-07 PROCEDURE — 82948 REAGENT STRIP/BLOOD GLUCOSE: CPT

## 2024-01-07 PROCEDURE — 83735 ASSAY OF MAGNESIUM: CPT

## 2024-01-07 PROCEDURE — 2140000000 HC CCU INTERMEDIATE R&B

## 2024-01-07 PROCEDURE — 6360000002 HC RX W HCPCS: Performed by: SURGERY

## 2024-01-07 RX ADMIN — INSULIN LISPRO 2 UNITS: 100 INJECTION, SOLUTION INTRAVENOUS; SUBCUTANEOUS at 17:03

## 2024-01-07 RX ADMIN — OXYCODONE HYDROCHLORIDE AND ACETAMINOPHEN 1 TABLET: 5; 325 TABLET ORAL at 20:21

## 2024-01-07 RX ADMIN — SODIUM CHLORIDE, PRESERVATIVE FREE 10 ML: 5 INJECTION INTRAVENOUS at 19:38

## 2024-01-07 RX ADMIN — BUSPIRONE HYDROCHLORIDE 10 MG: 10 TABLET ORAL at 19:38

## 2024-01-07 RX ADMIN — PIPERACILLIN SODIUM AND TAZOBACTAM SODIUM 3375 MG: 3; .375 INJECTION, POWDER, LYOPHILIZED, FOR SOLUTION INTRAVENOUS at 03:45

## 2024-01-07 RX ADMIN — QUETIAPINE FUMARATE 100 MG: 100 TABLET ORAL at 09:00

## 2024-01-07 RX ADMIN — PIPERACILLIN SODIUM AND TAZOBACTAM SODIUM 3375 MG: 3; .375 INJECTION, POWDER, LYOPHILIZED, FOR SOLUTION INTRAVENOUS at 17:01

## 2024-01-07 RX ADMIN — ATORVASTATIN CALCIUM 10 MG: 10 TABLET, FILM COATED ORAL at 19:38

## 2024-01-07 RX ADMIN — GABAPENTIN 300 MG: 600 TABLET, FILM COATED ORAL at 20:21

## 2024-01-07 RX ADMIN — BUSPIRONE HYDROCHLORIDE 10 MG: 10 TABLET ORAL at 09:00

## 2024-01-07 RX ADMIN — INSULIN GLARGINE 15 UNITS: 100 INJECTION, SOLUTION SUBCUTANEOUS at 20:21

## 2024-01-07 RX ADMIN — AMIODARONE HYDROCHLORIDE 100 MG: 200 TABLET ORAL at 09:00

## 2024-01-07 RX ADMIN — LEVOTHYROXINE SODIUM 50 MCG: 0.05 TABLET ORAL at 06:42

## 2024-01-07 RX ADMIN — QUETIAPINE FUMARATE 100 MG: 100 TABLET ORAL at 19:38

## 2024-01-07 ASSESSMENT — PAIN SCALES - GENERAL
PAINLEVEL_OUTOF10: 0
PAINLEVEL_OUTOF10: 5
PAINLEVEL_OUTOF10: 3

## 2024-01-07 ASSESSMENT — PAIN DESCRIPTION - ORIENTATION: ORIENTATION: MID

## 2024-01-07 ASSESSMENT — PAIN DESCRIPTION - LOCATION: LOCATION: ABDOMEN

## 2024-01-07 ASSESSMENT — PAIN DESCRIPTION - FREQUENCY: FREQUENCY: CONTINUOUS

## 2024-01-07 ASSESSMENT — PAIN DESCRIPTION - PAIN TYPE: TYPE: SURGICAL PAIN

## 2024-01-07 ASSESSMENT — PAIN - FUNCTIONAL ASSESSMENT: PAIN_FUNCTIONAL_ASSESSMENT: PREVENTS OR INTERFERES SOME ACTIVE ACTIVITIES AND ADLS

## 2024-01-07 ASSESSMENT — PAIN DESCRIPTION - DESCRIPTORS: DESCRIPTORS: ACHING

## 2024-01-07 ASSESSMENT — PAIN DESCRIPTION - ONSET: ONSET: ON-GOING

## 2024-01-08 LAB
ANION GAP SERPL CALC-SCNC: 9 MEQ/L (ref 8–16)
BUN SERPL-MCNC: 35 MG/DL (ref 7–22)
CALCIUM SERPL-MCNC: 7.7 MG/DL (ref 8.5–10.5)
CHLORIDE SERPL-SCNC: 106 MEQ/L (ref 98–111)
CO2 SERPL-SCNC: 21 MEQ/L (ref 23–33)
CREAT SERPL-MCNC: 4 MG/DL (ref 0.4–1.2)
DEPRECATED RDW RBC AUTO: 46.5 FL (ref 35–45)
ERYTHROCYTE [DISTWIDTH] IN BLOOD BY AUTOMATED COUNT: 12.5 % (ref 11.5–14.5)
GFR SERPL CREATININE-BSD FRML MDRD: 15 ML/MIN/1.73M2
GLUCOSE BLD STRIP.AUTO-MCNC: 238 MG/DL (ref 70–108)
GLUCOSE BLD STRIP.AUTO-MCNC: 239 MG/DL (ref 70–108)
GLUCOSE BLD STRIP.AUTO-MCNC: 244 MG/DL (ref 70–108)
GLUCOSE BLD STRIP.AUTO-MCNC: 257 MG/DL (ref 70–108)
GLUCOSE BLD STRIP.AUTO-MCNC: 299 MG/DL (ref 70–108)
GLUCOSE SERPL-MCNC: 246 MG/DL (ref 70–108)
HCT VFR BLD AUTO: 26 % (ref 42–52)
HGB BLD-MCNC: 8.2 GM/DL (ref 14–18)
MAGNESIUM SERPL-MCNC: 1.5 MG/DL (ref 1.6–2.4)
MCH RBC QN AUTO: 32.4 PG (ref 26–33)
MCHC RBC AUTO-ENTMCNC: 31.5 GM/DL (ref 32.2–35.5)
MCV RBC AUTO: 102.8 FL (ref 80–94)
PHOSPHATE SERPL-MCNC: 4.1 MG/DL (ref 2.4–4.7)
PLATELET # BLD AUTO: 245 THOU/MM3 (ref 130–400)
PMV BLD AUTO: 10.3 FL (ref 9.4–12.4)
POTASSIUM SERPL-SCNC: 4.3 MEQ/L (ref 3.5–5.2)
RBC # BLD AUTO: 2.53 MILL/MM3 (ref 4.7–6.1)
SODIUM SERPL-SCNC: 136 MEQ/L (ref 135–145)
WBC # BLD AUTO: 10.3 THOU/MM3 (ref 4.8–10.8)

## 2024-01-08 PROCEDURE — 99232 SBSQ HOSP IP/OBS MODERATE 35: CPT | Performed by: INTERNAL MEDICINE

## 2024-01-08 PROCEDURE — 82948 REAGENT STRIP/BLOOD GLUCOSE: CPT

## 2024-01-08 PROCEDURE — 51798 US URINE CAPACITY MEASURE: CPT

## 2024-01-08 PROCEDURE — 6360000002 HC RX W HCPCS: Performed by: SURGERY

## 2024-01-08 PROCEDURE — 6370000000 HC RX 637 (ALT 250 FOR IP): Performed by: FAMILY MEDICINE

## 2024-01-08 PROCEDURE — 84100 ASSAY OF PHOSPHORUS: CPT

## 2024-01-08 PROCEDURE — 97530 THERAPEUTIC ACTIVITIES: CPT

## 2024-01-08 PROCEDURE — 2580000003 HC RX 258: Performed by: SURGERY

## 2024-01-08 PROCEDURE — 6370000000 HC RX 637 (ALT 250 FOR IP): Performed by: SURGERY

## 2024-01-08 PROCEDURE — 51701 INSERT BLADDER CATHETER: CPT

## 2024-01-08 PROCEDURE — 83735 ASSAY OF MAGNESIUM: CPT

## 2024-01-08 PROCEDURE — 97113 AQUATIC THERAPY/EXERCISES: CPT

## 2024-01-08 PROCEDURE — 6360000002 HC RX W HCPCS: Performed by: INTERNAL MEDICINE

## 2024-01-08 PROCEDURE — 1200000000 HC SEMI PRIVATE

## 2024-01-08 PROCEDURE — 80048 BASIC METABOLIC PNL TOTAL CA: CPT

## 2024-01-08 PROCEDURE — 97110 THERAPEUTIC EXERCISES: CPT

## 2024-01-08 PROCEDURE — 36415 COLL VENOUS BLD VENIPUNCTURE: CPT

## 2024-01-08 PROCEDURE — 97116 GAIT TRAINING THERAPY: CPT

## 2024-01-08 PROCEDURE — 6370000000 HC RX 637 (ALT 250 FOR IP): Performed by: INTERNAL MEDICINE

## 2024-01-08 PROCEDURE — 99232 SBSQ HOSP IP/OBS MODERATE 35: CPT | Performed by: FAMILY MEDICINE

## 2024-01-08 PROCEDURE — 85027 COMPLETE CBC AUTOMATED: CPT

## 2024-01-08 RX ORDER — AMLODIPINE BESYLATE 5 MG/1
5 TABLET ORAL DAILY
Status: DISCONTINUED | OUTPATIENT
Start: 2024-01-08 | End: 2024-01-16 | Stop reason: HOSPADM

## 2024-01-08 RX ORDER — INSULIN GLARGINE 100 [IU]/ML
20 INJECTION, SOLUTION SUBCUTANEOUS NIGHTLY
Status: DISCONTINUED | OUTPATIENT
Start: 2024-01-08 | End: 2024-01-16 | Stop reason: HOSPADM

## 2024-01-08 RX ORDER — MAGNESIUM SULFATE IN WATER 40 MG/ML
2000 INJECTION, SOLUTION INTRAVENOUS ONCE
Status: COMPLETED | OUTPATIENT
Start: 2024-01-08 | End: 2024-01-08

## 2024-01-08 RX ADMIN — INSULIN GLARGINE 20 UNITS: 100 INJECTION, SOLUTION SUBCUTANEOUS at 21:29

## 2024-01-08 RX ADMIN — QUETIAPINE FUMARATE 100 MG: 100 TABLET ORAL at 10:08

## 2024-01-08 RX ADMIN — INSULIN LISPRO 4 UNITS: 100 INJECTION, SOLUTION INTRAVENOUS; SUBCUTANEOUS at 14:18

## 2024-01-08 RX ADMIN — MAGNESIUM SULFATE HEPTAHYDRATE 2000 MG: 40 INJECTION, SOLUTION INTRAVENOUS at 21:14

## 2024-01-08 RX ADMIN — SODIUM CHLORIDE, PRESERVATIVE FREE 10 ML: 5 INJECTION INTRAVENOUS at 10:11

## 2024-01-08 RX ADMIN — APIXABAN 5 MG: 5 TABLET, FILM COATED ORAL at 21:29

## 2024-01-08 RX ADMIN — BUSPIRONE HYDROCHLORIDE 10 MG: 10 TABLET ORAL at 21:29

## 2024-01-08 RX ADMIN — PIPERACILLIN SODIUM AND TAZOBACTAM SODIUM 3375 MG: 3; .375 INJECTION, POWDER, LYOPHILIZED, FOR SOLUTION INTRAVENOUS at 03:43

## 2024-01-08 RX ADMIN — BUSPIRONE HYDROCHLORIDE 10 MG: 10 TABLET ORAL at 10:08

## 2024-01-08 RX ADMIN — ATORVASTATIN CALCIUM 10 MG: 10 TABLET, FILM COATED ORAL at 21:29

## 2024-01-08 RX ADMIN — LEVOTHYROXINE SODIUM 50 MCG: 0.05 TABLET ORAL at 06:34

## 2024-01-08 RX ADMIN — INSULIN LISPRO 2 UNITS: 100 INJECTION, SOLUTION INTRAVENOUS; SUBCUTANEOUS at 10:07

## 2024-01-08 RX ADMIN — PIPERACILLIN SODIUM AND TAZOBACTAM SODIUM 3375 MG: 3; .375 INJECTION, POWDER, LYOPHILIZED, FOR SOLUTION INTRAVENOUS at 16:50

## 2024-01-08 RX ADMIN — AMLODIPINE BESYLATE 5 MG: 5 TABLET ORAL at 14:18

## 2024-01-08 RX ADMIN — AMIODARONE HYDROCHLORIDE 100 MG: 200 TABLET ORAL at 10:08

## 2024-01-08 RX ADMIN — INSULIN LISPRO 2 UNITS: 100 INJECTION, SOLUTION INTRAVENOUS; SUBCUTANEOUS at 18:03

## 2024-01-08 RX ADMIN — GABAPENTIN 300 MG: 600 TABLET, FILM COATED ORAL at 21:28

## 2024-01-08 RX ADMIN — SODIUM CHLORIDE, PRESERVATIVE FREE 10 ML: 5 INJECTION INTRAVENOUS at 21:28

## 2024-01-08 RX ADMIN — QUETIAPINE FUMARATE 100 MG: 100 TABLET ORAL at 21:28

## 2024-01-09 LAB
ANION GAP SERPL CALC-SCNC: 14 MEQ/L (ref 8–16)
BUN SERPL-MCNC: 31 MG/DL (ref 7–22)
CA-I BLD ISE-SCNC: 1.06 MMOL/L (ref 1.12–1.32)
CALCIUM SERPL-MCNC: 8.3 MG/DL (ref 8.5–10.5)
CHLORIDE SERPL-SCNC: 107 MEQ/L (ref 98–111)
CO2 SERPL-SCNC: 17 MEQ/L (ref 23–33)
CREAT SERPL-MCNC: 3.9 MG/DL (ref 0.4–1.2)
DEPRECATED RDW RBC AUTO: 49.4 FL (ref 35–45)
ERYTHROCYTE [DISTWIDTH] IN BLOOD BY AUTOMATED COUNT: 12.5 % (ref 11.5–14.5)
GFR SERPL CREATININE-BSD FRML MDRD: 16 ML/MIN/1.73M2
GLUCOSE BLD STRIP.AUTO-MCNC: 177 MG/DL (ref 70–108)
GLUCOSE BLD STRIP.AUTO-MCNC: 180 MG/DL (ref 70–108)
GLUCOSE BLD STRIP.AUTO-MCNC: 235 MG/DL (ref 70–108)
GLUCOSE BLD STRIP.AUTO-MCNC: 247 MG/DL (ref 70–108)
GLUCOSE SERPL-MCNC: 187 MG/DL (ref 70–108)
HCT VFR BLD AUTO: 29.7 % (ref 42–52)
HGB BLD-MCNC: 8.9 GM/DL (ref 14–18)
MAGNESIUM SERPL-MCNC: 1.7 MG/DL (ref 1.6–2.4)
MCH RBC QN AUTO: 32.1 PG (ref 26–33)
MCHC RBC AUTO-ENTMCNC: 30 GM/DL (ref 32.2–35.5)
MCV RBC AUTO: 107.2 FL (ref 80–94)
PHOSPHATE SERPL-MCNC: 3.4 MG/DL (ref 2.4–4.7)
PLATELET # BLD AUTO: 274 THOU/MM3 (ref 130–400)
PMV BLD AUTO: 10.4 FL (ref 9.4–12.4)
POTASSIUM SERPL-SCNC: 4.3 MEQ/L (ref 3.5–5.2)
RBC # BLD AUTO: 2.77 MILL/MM3 (ref 4.7–6.1)
SODIUM SERPL-SCNC: 138 MEQ/L (ref 135–145)
WBC # BLD AUTO: 13.7 THOU/MM3 (ref 4.8–10.8)

## 2024-01-09 PROCEDURE — 1200000000 HC SEMI PRIVATE

## 2024-01-09 PROCEDURE — 80048 BASIC METABOLIC PNL TOTAL CA: CPT

## 2024-01-09 PROCEDURE — 6370000000 HC RX 637 (ALT 250 FOR IP): Performed by: SURGERY

## 2024-01-09 PROCEDURE — 82330 ASSAY OF CALCIUM: CPT

## 2024-01-09 PROCEDURE — 85027 COMPLETE CBC AUTOMATED: CPT

## 2024-01-09 PROCEDURE — 97530 THERAPEUTIC ACTIVITIES: CPT

## 2024-01-09 PROCEDURE — 83735 ASSAY OF MAGNESIUM: CPT

## 2024-01-09 PROCEDURE — 36415 COLL VENOUS BLD VENIPUNCTURE: CPT

## 2024-01-09 PROCEDURE — 84100 ASSAY OF PHOSPHORUS: CPT

## 2024-01-09 PROCEDURE — 99232 SBSQ HOSP IP/OBS MODERATE 35: CPT | Performed by: INTERNAL MEDICINE

## 2024-01-09 PROCEDURE — 6370000000 HC RX 637 (ALT 250 FOR IP): Performed by: INTERNAL MEDICINE

## 2024-01-09 PROCEDURE — 99232 SBSQ HOSP IP/OBS MODERATE 35: CPT | Performed by: FAMILY MEDICINE

## 2024-01-09 PROCEDURE — 2580000003 HC RX 258: Performed by: SURGERY

## 2024-01-09 PROCEDURE — 6370000000 HC RX 637 (ALT 250 FOR IP): Performed by: FAMILY MEDICINE

## 2024-01-09 PROCEDURE — 97110 THERAPEUTIC EXERCISES: CPT

## 2024-01-09 PROCEDURE — 82948 REAGENT STRIP/BLOOD GLUCOSE: CPT

## 2024-01-09 RX ORDER — SODIUM BICARBONATE 650 MG/1
1300 TABLET ORAL 2 TIMES DAILY
Status: DISCONTINUED | OUTPATIENT
Start: 2024-01-09 | End: 2024-01-16 | Stop reason: HOSPADM

## 2024-01-09 RX ADMIN — TAMSULOSIN HYDROCHLORIDE 0.4 MG: 0.4 CAPSULE ORAL at 08:03

## 2024-01-09 RX ADMIN — QUETIAPINE FUMARATE 100 MG: 100 TABLET ORAL at 20:32

## 2024-01-09 RX ADMIN — INSULIN GLARGINE 20 UNITS: 100 INJECTION, SOLUTION SUBCUTANEOUS at 20:40

## 2024-01-09 RX ADMIN — AMLODIPINE BESYLATE 5 MG: 5 TABLET ORAL at 08:03

## 2024-01-09 RX ADMIN — SODIUM CHLORIDE, PRESERVATIVE FREE 10 ML: 5 INJECTION INTRAVENOUS at 20:31

## 2024-01-09 RX ADMIN — AMIODARONE HYDROCHLORIDE 100 MG: 200 TABLET ORAL at 08:03

## 2024-01-09 RX ADMIN — BUSPIRONE HYDROCHLORIDE 10 MG: 10 TABLET ORAL at 08:04

## 2024-01-09 RX ADMIN — ATORVASTATIN CALCIUM 10 MG: 10 TABLET, FILM COATED ORAL at 20:31

## 2024-01-09 RX ADMIN — SODIUM BICARBONATE 1300 MG: 650 TABLET ORAL at 12:20

## 2024-01-09 RX ADMIN — APIXABAN 5 MG: 5 TABLET, FILM COATED ORAL at 20:30

## 2024-01-09 RX ADMIN — BUSPIRONE HYDROCHLORIDE 10 MG: 10 TABLET ORAL at 20:31

## 2024-01-09 RX ADMIN — APIXABAN 5 MG: 5 TABLET, FILM COATED ORAL at 08:03

## 2024-01-09 RX ADMIN — SODIUM CHLORIDE, PRESERVATIVE FREE 10 ML: 5 INJECTION INTRAVENOUS at 08:04

## 2024-01-09 RX ADMIN — QUETIAPINE FUMARATE 100 MG: 100 TABLET ORAL at 08:03

## 2024-01-09 RX ADMIN — LEVOTHYROXINE SODIUM 50 MCG: 0.05 TABLET ORAL at 08:03

## 2024-01-09 RX ADMIN — SODIUM BICARBONATE 1300 MG: 650 TABLET ORAL at 20:33

## 2024-01-09 RX ADMIN — GABAPENTIN 300 MG: 600 TABLET, FILM COATED ORAL at 20:30

## 2024-01-09 RX ADMIN — INSULIN LISPRO 2 UNITS: 100 INJECTION, SOLUTION INTRAVENOUS; SUBCUTANEOUS at 17:40

## 2024-01-10 LAB
ANION GAP SERPL CALC-SCNC: 10 MEQ/L (ref 8–16)
BUN SERPL-MCNC: 30 MG/DL (ref 7–22)
C CAYETANENSIS DNA SPEC QL NAA+PROBE: NOT DETECTED
C DIFF GDH STL QL: NEGATIVE
CALCIUM SERPL-MCNC: 8.1 MG/DL (ref 8.5–10.5)
CAMPY SP DNA.DIARRHEA STL QL NAA+PROBE: NOT DETECTED
CHLORIDE SERPL-SCNC: 106 MEQ/L (ref 98–111)
CO2 SERPL-SCNC: 22 MEQ/L (ref 23–33)
CREAT SERPL-MCNC: 4.2 MG/DL (ref 0.4–1.2)
CRYPTOSP DNA SPEC QL NAA+PROBE: NOT DETECTED
DEPRECATED RDW RBC AUTO: 47.1 FL (ref 35–45)
E COLI O157H7 DNA SPEC QL NAA+PROBE: NORMAL
E HISTOLYT DNA SPEC QL NAA+PROBE: NOT DETECTED
EAEC PAA PLAS AGGR+AATA ST NAA+NON-PRB: NOT DETECTED
EC STX1+STX2 + H7 FLIC SPEC NAA+PROBE: NOT DETECTED
EPEC EAE GENE STL QL NAA+NON-PROBE: NOT DETECTED
ERYTHROCYTE [DISTWIDTH] IN BLOOD BY AUTOMATED COUNT: 12.5 % (ref 11.5–14.5)
ETEC LTA+ST1A+ST1B TOX ST NAA+NON-PROBE: NOT DETECTED
G LAMBLIA DNA SPEC QL NAA+PROBE: NOT DETECTED
GFR SERPL CREATININE-BSD FRML MDRD: 14 ML/MIN/1.73M2
GLUCOSE BLD STRIP.AUTO-MCNC: 117 MG/DL (ref 70–108)
GLUCOSE BLD STRIP.AUTO-MCNC: 154 MG/DL (ref 70–108)
GLUCOSE BLD STRIP.AUTO-MCNC: 174 MG/DL (ref 70–108)
GLUCOSE BLD STRIP.AUTO-MCNC: 187 MG/DL (ref 70–108)
GLUCOSE SERPL-MCNC: 128 MG/DL (ref 70–108)
HADV DNA SPEC QL NAA+PROBE: NOT DETECTED
HASTV RNA SPEC QL NAA+PROBE: NOT DETECTED
HCT VFR BLD AUTO: 25.9 % (ref 42–52)
HGB BLD-MCNC: 8 GM/DL (ref 14–18)
MAGNESIUM SERPL-MCNC: 1.6 MG/DL (ref 1.6–2.4)
MCH RBC QN AUTO: 32.1 PG (ref 26–33)
MCHC RBC AUTO-ENTMCNC: 30.9 GM/DL (ref 32.2–35.5)
MCV RBC AUTO: 104 FL (ref 80–94)
NOROVIRUS GI + GII RNA STL NAA+PROBE: NOT DETECTED
P SHIGELLOIDES DNA STL QL NAA+PROBE: NOT DETECTED
PHOSPHATE SERPL-MCNC: 3.3 MG/DL (ref 2.4–4.7)
PLATELET # BLD AUTO: 251 THOU/MM3 (ref 130–400)
PMV BLD AUTO: 10.6 FL (ref 9.4–12.4)
POTASSIUM SERPL-SCNC: 4.4 MEQ/L (ref 3.5–5.2)
RBC # BLD AUTO: 2.49 MILL/MM3 (ref 4.7–6.1)
RV RNA SPEC QL NAA+PROBE: NOT DETECTED
SALMONELLA DNA SPEC QL NAA+PROBE: NOT DETECTED
SAPOVIRUS RNA SPEC QL NAA+PROBE: NOT DETECTED
SHIGELLA SP+EIEC IPAH ST NAA+NON-PROBE: NOT DETECTED
SODIUM SERPL-SCNC: 138 MEQ/L (ref 135–145)
V CHOLERAE DNA SPEC QL NAA+PROBE: NOT DETECTED
VIBRIO DNA SPEC NAA+PROBE: NOT DETECTED
WBC # BLD AUTO: 11.2 THOU/MM3 (ref 4.8–10.8)
Y ENTERO RECN STL QL NAA+PROBE: NOT DETECTED

## 2024-01-10 PROCEDURE — 97530 THERAPEUTIC ACTIVITIES: CPT

## 2024-01-10 PROCEDURE — 99232 SBSQ HOSP IP/OBS MODERATE 35: CPT | Performed by: INTERNAL MEDICINE

## 2024-01-10 PROCEDURE — 97110 THERAPEUTIC EXERCISES: CPT

## 2024-01-10 PROCEDURE — 36415 COLL VENOUS BLD VENIPUNCTURE: CPT

## 2024-01-10 PROCEDURE — 2580000003 HC RX 258: Performed by: SURGERY

## 2024-01-10 PROCEDURE — 85027 COMPLETE CBC AUTOMATED: CPT

## 2024-01-10 PROCEDURE — 99232 SBSQ HOSP IP/OBS MODERATE 35: CPT | Performed by: NURSE PRACTITIONER

## 2024-01-10 PROCEDURE — 1200000000 HC SEMI PRIVATE

## 2024-01-10 PROCEDURE — 87449 NOS EACH ORGANISM AG IA: CPT

## 2024-01-10 PROCEDURE — 6370000000 HC RX 637 (ALT 250 FOR IP): Performed by: INTERNAL MEDICINE

## 2024-01-10 PROCEDURE — 80048 BASIC METABOLIC PNL TOTAL CA: CPT

## 2024-01-10 PROCEDURE — 87507 IADNA-DNA/RNA PROBE TQ 12-25: CPT

## 2024-01-10 PROCEDURE — 82948 REAGENT STRIP/BLOOD GLUCOSE: CPT

## 2024-01-10 PROCEDURE — 83735 ASSAY OF MAGNESIUM: CPT

## 2024-01-10 PROCEDURE — 6370000000 HC RX 637 (ALT 250 FOR IP): Performed by: FAMILY MEDICINE

## 2024-01-10 PROCEDURE — 6370000000 HC RX 637 (ALT 250 FOR IP): Performed by: SURGERY

## 2024-01-10 PROCEDURE — 97535 SELF CARE MNGMENT TRAINING: CPT

## 2024-01-10 PROCEDURE — 84100 ASSAY OF PHOSPHORUS: CPT

## 2024-01-10 RX ADMIN — SODIUM BICARBONATE 1300 MG: 650 TABLET ORAL at 20:06

## 2024-01-10 RX ADMIN — AMIODARONE HYDROCHLORIDE 100 MG: 200 TABLET ORAL at 08:12

## 2024-01-10 RX ADMIN — BUSPIRONE HYDROCHLORIDE 10 MG: 10 TABLET ORAL at 08:13

## 2024-01-10 RX ADMIN — BUSPIRONE HYDROCHLORIDE 10 MG: 10 TABLET ORAL at 20:06

## 2024-01-10 RX ADMIN — TAMSULOSIN HYDROCHLORIDE 0.4 MG: 0.4 CAPSULE ORAL at 08:13

## 2024-01-10 RX ADMIN — GABAPENTIN 300 MG: 600 TABLET, FILM COATED ORAL at 20:09

## 2024-01-10 RX ADMIN — INSULIN GLARGINE 20 UNITS: 100 INJECTION, SOLUTION SUBCUTANEOUS at 20:08

## 2024-01-10 RX ADMIN — QUETIAPINE FUMARATE 100 MG: 100 TABLET ORAL at 20:06

## 2024-01-10 RX ADMIN — LEVOTHYROXINE SODIUM 50 MCG: 0.05 TABLET ORAL at 08:13

## 2024-01-10 RX ADMIN — QUETIAPINE FUMARATE 100 MG: 100 TABLET ORAL at 08:13

## 2024-01-10 RX ADMIN — APIXABAN 5 MG: 5 TABLET, FILM COATED ORAL at 20:06

## 2024-01-10 RX ADMIN — ATORVASTATIN CALCIUM 10 MG: 10 TABLET, FILM COATED ORAL at 20:07

## 2024-01-10 RX ADMIN — SODIUM BICARBONATE 1300 MG: 650 TABLET ORAL at 08:12

## 2024-01-10 RX ADMIN — APIXABAN 5 MG: 5 TABLET, FILM COATED ORAL at 08:12

## 2024-01-10 RX ADMIN — SODIUM CHLORIDE, PRESERVATIVE FREE 10 ML: 5 INJECTION INTRAVENOUS at 08:14

## 2024-01-10 RX ADMIN — AMLODIPINE BESYLATE 5 MG: 5 TABLET ORAL at 08:13

## 2024-01-11 LAB
ABO: NORMAL
ANION GAP SERPL CALC-SCNC: 11 MEQ/L (ref 8–16)
ANTIBODY SCREEN: NORMAL
BUN SERPL-MCNC: 31 MG/DL (ref 7–22)
CALCIUM SERPL-MCNC: 7.8 MG/DL (ref 8.5–10.5)
CHLORIDE SERPL-SCNC: 107 MEQ/L (ref 98–111)
CO2 SERPL-SCNC: 21 MEQ/L (ref 23–33)
CREAT SERPL-MCNC: 3.7 MG/DL (ref 0.4–1.2)
DEPRECATED RDW RBC AUTO: 46.4 FL (ref 35–45)
ERYTHROCYTE [DISTWIDTH] IN BLOOD BY AUTOMATED COUNT: 12.6 % (ref 11.5–14.5)
GFR SERPL CREATININE-BSD FRML MDRD: 17 ML/MIN/1.73M2
GLUCOSE BLD STRIP.AUTO-MCNC: 115 MG/DL (ref 70–108)
GLUCOSE BLD STRIP.AUTO-MCNC: 121 MG/DL (ref 70–108)
GLUCOSE BLD STRIP.AUTO-MCNC: 129 MG/DL (ref 70–108)
GLUCOSE BLD STRIP.AUTO-MCNC: 87 MG/DL (ref 70–108)
GLUCOSE SERPL-MCNC: 102 MG/DL (ref 70–108)
HCT VFR BLD AUTO: 21.4 % (ref 42–52)
HCT VFR BLD AUTO: 22.2 % (ref 42–52)
HCT VFR BLD AUTO: 25.1 % (ref 42–52)
HGB BLD-MCNC: 6.5 GM/DL (ref 14–18)
HGB BLD-MCNC: 6.7 GM/DL (ref 14–18)
HGB BLD-MCNC: 8.1 GM/DL (ref 14–18)
MAGNESIUM SERPL-MCNC: 1.4 MG/DL (ref 1.6–2.4)
MCH RBC QN AUTO: 31.5 PG (ref 26–33)
MCHC RBC AUTO-ENTMCNC: 31.3 GM/DL (ref 32.2–35.5)
MCV RBC AUTO: 100.5 FL (ref 80–94)
PHOSPHATE SERPL-MCNC: 3.4 MG/DL (ref 2.4–4.7)
PLATELET # BLD AUTO: 220 THOU/MM3 (ref 130–400)
PMV BLD AUTO: 10.7 FL (ref 9.4–12.4)
POTASSIUM SERPL-SCNC: 4.1 MEQ/L (ref 3.5–5.2)
RBC # BLD AUTO: 2.13 MILL/MM3 (ref 4.7–6.1)
RH FACTOR: NORMAL
SODIUM SERPL-SCNC: 139 MEQ/L (ref 135–145)
WBC # BLD AUTO: 8.8 THOU/MM3 (ref 4.8–10.8)

## 2024-01-11 PROCEDURE — 36415 COLL VENOUS BLD VENIPUNCTURE: CPT

## 2024-01-11 PROCEDURE — 83735 ASSAY OF MAGNESIUM: CPT

## 2024-01-11 PROCEDURE — 6370000000 HC RX 637 (ALT 250 FOR IP): Performed by: SURGERY

## 2024-01-11 PROCEDURE — 84100 ASSAY OF PHOSPHORUS: CPT

## 2024-01-11 PROCEDURE — 86923 COMPATIBILITY TEST ELECTRIC: CPT

## 2024-01-11 PROCEDURE — 6370000000 HC RX 637 (ALT 250 FOR IP): Performed by: FAMILY MEDICINE

## 2024-01-11 PROCEDURE — 36430 TRANSFUSION BLD/BLD COMPNT: CPT

## 2024-01-11 PROCEDURE — 86900 BLOOD TYPING SEROLOGIC ABO: CPT

## 2024-01-11 PROCEDURE — 85027 COMPLETE CBC AUTOMATED: CPT

## 2024-01-11 PROCEDURE — 30233N1 TRANSFUSION OF NONAUTOLOGOUS RED BLOOD CELLS INTO PERIPHERAL VEIN, PERCUTANEOUS APPROACH: ICD-10-PCS | Performed by: INTERNAL MEDICINE

## 2024-01-11 PROCEDURE — 1200000000 HC SEMI PRIVATE

## 2024-01-11 PROCEDURE — P9016 RBC LEUKOCYTES REDUCED: HCPCS

## 2024-01-11 PROCEDURE — 99233 SBSQ HOSP IP/OBS HIGH 50: CPT | Performed by: NURSE PRACTITIONER

## 2024-01-11 PROCEDURE — 85018 HEMOGLOBIN: CPT

## 2024-01-11 PROCEDURE — 86901 BLOOD TYPING SEROLOGIC RH(D): CPT

## 2024-01-11 PROCEDURE — 6370000000 HC RX 637 (ALT 250 FOR IP): Performed by: INTERNAL MEDICINE

## 2024-01-11 PROCEDURE — 99232 SBSQ HOSP IP/OBS MODERATE 35: CPT | Performed by: INTERNAL MEDICINE

## 2024-01-11 PROCEDURE — 85014 HEMATOCRIT: CPT

## 2024-01-11 PROCEDURE — 86850 RBC ANTIBODY SCREEN: CPT

## 2024-01-11 PROCEDURE — 80048 BASIC METABOLIC PNL TOTAL CA: CPT

## 2024-01-11 PROCEDURE — 6360000002 HC RX W HCPCS: Performed by: INTERNAL MEDICINE

## 2024-01-11 PROCEDURE — 2580000003 HC RX 258: Performed by: SURGERY

## 2024-01-11 PROCEDURE — 82948 REAGENT STRIP/BLOOD GLUCOSE: CPT

## 2024-01-11 RX ORDER — MAGNESIUM SULFATE IN WATER 40 MG/ML
2000 INJECTION, SOLUTION INTRAVENOUS PRN
Status: DISCONTINUED | OUTPATIENT
Start: 2024-01-11 | End: 2024-01-11

## 2024-01-11 RX ORDER — POTASSIUM CHLORIDE 20 MEQ/1
40 TABLET, EXTENDED RELEASE ORAL PRN
Status: DISCONTINUED | OUTPATIENT
Start: 2024-01-11 | End: 2024-01-11

## 2024-01-11 RX ORDER — LOPERAMIDE HYDROCHLORIDE 2 MG/1
2 CAPSULE ORAL 4 TIMES DAILY PRN
Status: DISCONTINUED | OUTPATIENT
Start: 2024-01-11 | End: 2024-01-16 | Stop reason: HOSPADM

## 2024-01-11 RX ORDER — SODIUM CHLORIDE 9 MG/ML
INJECTION, SOLUTION INTRAVENOUS PRN
Status: DISCONTINUED | OUTPATIENT
Start: 2024-01-11 | End: 2024-01-13

## 2024-01-11 RX ORDER — MAGNESIUM SULFATE IN WATER 40 MG/ML
2000 INJECTION, SOLUTION INTRAVENOUS ONCE
Status: COMPLETED | OUTPATIENT
Start: 2024-01-11 | End: 2024-01-11

## 2024-01-11 RX ORDER — LANOLIN ALCOHOL/MO/W.PET/CERES
400 CREAM (GRAM) TOPICAL 2 TIMES DAILY
Status: DISCONTINUED | OUTPATIENT
Start: 2024-01-11 | End: 2024-01-16 | Stop reason: HOSPADM

## 2024-01-11 RX ORDER — POTASSIUM CHLORIDE 7.45 MG/ML
10 INJECTION INTRAVENOUS PRN
Status: DISCONTINUED | OUTPATIENT
Start: 2024-01-11 | End: 2024-01-11

## 2024-01-11 RX ADMIN — AMIODARONE HYDROCHLORIDE 100 MG: 200 TABLET ORAL at 08:10

## 2024-01-11 RX ADMIN — EPOETIN ALFA-EPBX 10000 UNITS: 10000 INJECTION, SOLUTION INTRAVENOUS; SUBCUTANEOUS at 16:56

## 2024-01-11 RX ADMIN — INSULIN GLARGINE 20 UNITS: 100 INJECTION, SOLUTION SUBCUTANEOUS at 21:23

## 2024-01-11 RX ADMIN — Medication 400 MG: at 13:52

## 2024-01-11 RX ADMIN — GABAPENTIN 300 MG: 600 TABLET, FILM COATED ORAL at 21:15

## 2024-01-11 RX ADMIN — BUSPIRONE HYDROCHLORIDE 10 MG: 10 TABLET ORAL at 08:08

## 2024-01-11 RX ADMIN — QUETIAPINE FUMARATE 100 MG: 100 TABLET ORAL at 08:08

## 2024-01-11 RX ADMIN — LEVOTHYROXINE SODIUM 50 MCG: 0.05 TABLET ORAL at 06:28

## 2024-01-11 RX ADMIN — QUETIAPINE FUMARATE 100 MG: 100 TABLET ORAL at 21:14

## 2024-01-11 RX ADMIN — SODIUM BICARBONATE 1300 MG: 650 TABLET ORAL at 21:14

## 2024-01-11 RX ADMIN — MAGNESIUM SULFATE HEPTAHYDRATE 2000 MG: 40 INJECTION, SOLUTION INTRAVENOUS at 13:54

## 2024-01-11 RX ADMIN — BUSPIRONE HYDROCHLORIDE 10 MG: 10 TABLET ORAL at 21:14

## 2024-01-11 RX ADMIN — Medication 400 MG: at 21:23

## 2024-01-11 RX ADMIN — AMLODIPINE BESYLATE 5 MG: 5 TABLET ORAL at 08:09

## 2024-01-11 RX ADMIN — ATORVASTATIN CALCIUM 10 MG: 10 TABLET, FILM COATED ORAL at 21:14

## 2024-01-11 RX ADMIN — SODIUM BICARBONATE 1300 MG: 650 TABLET ORAL at 08:08

## 2024-01-11 RX ADMIN — APIXABAN 5 MG: 5 TABLET, FILM COATED ORAL at 08:09

## 2024-01-11 RX ADMIN — SODIUM CHLORIDE, PRESERVATIVE FREE 10 ML: 5 INJECTION INTRAVENOUS at 21:16

## 2024-01-11 RX ADMIN — TAMSULOSIN HYDROCHLORIDE 0.4 MG: 0.4 CAPSULE ORAL at 08:08

## 2024-01-11 NOTE — CONSENT
Informed Consent for Blood Component Transfusion Note    I have discussed with the patient the rationale for blood component transfusion; its benefits in treating or preventing fatigue, organ damage, or death; and its risk which includes mild transfusion reactions, rare risk of blood borne infection, or more serious but rare reactions. I have discussed the alternatives to transfusion, including the risk and consequences of not receiving transfusion. The patient had an opportunity to ask questions and had agreed to proceed with transfusion of blood components.    Electronically signed by ELAINE Campbell CNP on 1/11/24 at 8:26 AM EST

## 2024-01-12 ENCOUNTER — APPOINTMENT (OUTPATIENT)
Dept: GENERAL RADIOLOGY | Age: 74
DRG: 329 | End: 2024-01-12
Attending: STUDENT IN AN ORGANIZED HEALTH CARE EDUCATION/TRAINING PROGRAM
Payer: MEDICARE

## 2024-01-12 LAB
ANION GAP SERPL CALC-SCNC: 10 MEQ/L (ref 8–16)
BUN SERPL-MCNC: 32 MG/DL (ref 7–22)
CALCIUM SERPL-MCNC: 7.7 MG/DL (ref 8.5–10.5)
CHLORIDE SERPL-SCNC: 106 MEQ/L (ref 98–111)
CO2 SERPL-SCNC: 22 MEQ/L (ref 23–33)
CREAT SERPL-MCNC: 3.4 MG/DL (ref 0.4–1.2)
DEPRECATED RDW RBC AUTO: 50.5 FL (ref 35–45)
ERYTHROCYTE [DISTWIDTH] IN BLOOD BY AUTOMATED COUNT: 14.4 % (ref 11.5–14.5)
GFR SERPL CREATININE-BSD FRML MDRD: 18 ML/MIN/1.73M2
GLUCOSE BLD STRIP.AUTO-MCNC: 133 MG/DL (ref 70–108)
GLUCOSE BLD STRIP.AUTO-MCNC: 144 MG/DL (ref 70–108)
GLUCOSE BLD STRIP.AUTO-MCNC: 165 MG/DL (ref 70–108)
GLUCOSE BLD STRIP.AUTO-MCNC: 63 MG/DL (ref 70–108)
GLUCOSE BLD STRIP.AUTO-MCNC: 95 MG/DL (ref 70–108)
GLUCOSE SERPL-MCNC: 74 MG/DL (ref 70–108)
HCT VFR BLD AUTO: 22.8 % (ref 42–52)
HCT VFR BLD AUTO: 22.9 % (ref 42–52)
HCT VFR BLD AUTO: 24.6 % (ref 42–52)
HGB BLD-MCNC: 7.5 GM/DL (ref 14–18)
HGB BLD-MCNC: 7.7 GM/DL (ref 14–18)
HGB BLD-MCNC: 8.2 GM/DL (ref 14–18)
MAGNESIUM SERPL-MCNC: 1.9 MG/DL (ref 1.6–2.4)
MCH RBC QN AUTO: 32.5 PG (ref 26–33)
MCHC RBC AUTO-ENTMCNC: 33.6 GM/DL (ref 32.2–35.5)
MCV RBC AUTO: 96.6 FL (ref 80–94)
PHOSPHATE SERPL-MCNC: 3.2 MG/DL (ref 2.4–4.7)
PLATELET # BLD AUTO: 235 THOU/MM3 (ref 130–400)
PMV BLD AUTO: 10.9 FL (ref 9.4–12.4)
POTASSIUM SERPL-SCNC: 3.8 MEQ/L (ref 3.5–5.2)
RBC # BLD AUTO: 2.37 MILL/MM3 (ref 4.7–6.1)
SODIUM SERPL-SCNC: 138 MEQ/L (ref 135–145)
WBC # BLD AUTO: 8.4 THOU/MM3 (ref 4.8–10.8)

## 2024-01-12 PROCEDURE — 94640 AIRWAY INHALATION TREATMENT: CPT

## 2024-01-12 PROCEDURE — 36415 COLL VENOUS BLD VENIPUNCTURE: CPT

## 2024-01-12 PROCEDURE — 6370000000 HC RX 637 (ALT 250 FOR IP): Performed by: NURSE PRACTITIONER

## 2024-01-12 PROCEDURE — 1200000000 HC SEMI PRIVATE

## 2024-01-12 PROCEDURE — 84100 ASSAY OF PHOSPHORUS: CPT

## 2024-01-12 PROCEDURE — 6370000000 HC RX 637 (ALT 250 FOR IP): Performed by: INTERNAL MEDICINE

## 2024-01-12 PROCEDURE — 99232 SBSQ HOSP IP/OBS MODERATE 35: CPT | Performed by: INTERNAL MEDICINE

## 2024-01-12 PROCEDURE — 99233 SBSQ HOSP IP/OBS HIGH 50: CPT | Performed by: NURSE PRACTITIONER

## 2024-01-12 PROCEDURE — 85018 HEMOGLOBIN: CPT

## 2024-01-12 PROCEDURE — 85014 HEMATOCRIT: CPT

## 2024-01-12 PROCEDURE — 85027 COMPLETE CBC AUTOMATED: CPT

## 2024-01-12 PROCEDURE — 71046 X-RAY EXAM CHEST 2 VIEWS: CPT

## 2024-01-12 PROCEDURE — 6370000000 HC RX 637 (ALT 250 FOR IP): Performed by: SURGERY

## 2024-01-12 PROCEDURE — 82948 REAGENT STRIP/BLOOD GLUCOSE: CPT

## 2024-01-12 PROCEDURE — 74018 RADEX ABDOMEN 1 VIEW: CPT

## 2024-01-12 PROCEDURE — 83735 ASSAY OF MAGNESIUM: CPT

## 2024-01-12 PROCEDURE — 80048 BASIC METABOLIC PNL TOTAL CA: CPT

## 2024-01-12 PROCEDURE — 97535 SELF CARE MNGMENT TRAINING: CPT

## 2024-01-12 RX ORDER — IPRATROPIUM BROMIDE AND ALBUTEROL SULFATE 2.5; .5 MG/3ML; MG/3ML
1 SOLUTION RESPIRATORY (INHALATION)
Status: DISCONTINUED | OUTPATIENT
Start: 2024-01-12 | End: 2024-01-16

## 2024-01-12 RX ORDER — PANTOPRAZOLE SODIUM 40 MG/1
40 TABLET, DELAYED RELEASE ORAL
Status: DISCONTINUED | OUTPATIENT
Start: 2024-01-13 | End: 2024-01-16 | Stop reason: HOSPADM

## 2024-01-12 RX ORDER — CALCIUM POLYCARBOPHIL 625 MG 625 MG/1
625 TABLET ORAL DAILY
Status: DISCONTINUED | OUTPATIENT
Start: 2024-01-12 | End: 2024-01-16 | Stop reason: HOSPADM

## 2024-01-12 RX ORDER — IPRATROPIUM BROMIDE AND ALBUTEROL SULFATE 2.5; .5 MG/3ML; MG/3ML
1 SOLUTION RESPIRATORY (INHALATION)
Status: DISCONTINUED | OUTPATIENT
Start: 2024-01-12 | End: 2024-01-12

## 2024-01-12 RX ADMIN — IPRATROPIUM BROMIDE AND ALBUTEROL SULFATE 1 DOSE: .5; 3 SOLUTION RESPIRATORY (INHALATION) at 09:06

## 2024-01-12 RX ADMIN — AMLODIPINE BESYLATE 5 MG: 5 TABLET ORAL at 08:12

## 2024-01-12 RX ADMIN — TAMSULOSIN HYDROCHLORIDE 0.4 MG: 0.4 CAPSULE ORAL at 08:12

## 2024-01-12 RX ADMIN — QUETIAPINE FUMARATE 100 MG: 100 TABLET ORAL at 08:12

## 2024-01-12 RX ADMIN — AMIODARONE HYDROCHLORIDE 100 MG: 200 TABLET ORAL at 08:12

## 2024-01-12 RX ADMIN — Medication 400 MG: at 08:12

## 2024-01-12 RX ADMIN — LEVOTHYROXINE SODIUM 50 MCG: 0.05 TABLET ORAL at 08:12

## 2024-01-12 RX ADMIN — SODIUM BICARBONATE 1300 MG: 650 TABLET ORAL at 08:12

## 2024-01-12 RX ADMIN — BUSPIRONE HYDROCHLORIDE 10 MG: 10 TABLET ORAL at 08:12

## 2024-01-12 NOTE — RT PROTOCOL NOTE
RT Inhaler-Nebulizer Bronchodilator Protocol Note    There is a bronchodilator order in the chart from a provider indicating to follow the RT Bronchodilator Protocol and there is an “Initiate RT Inhaler-Nebulizer Bronchodilator Protocol” order as well (see protocol at bottom of note).    CXR Findings:  No results found.    The findings from the last RT Protocol Assessment were as follows:   History Pulmonary Disease: None or smoker <15 pack years  Respiratory Pattern: Regular pattern and RR 12-20 bpm  Breath Sounds: Inspiratory and expiratory or bilateral wheezing and/or rhonchi  Cough: Strong, spontaneous, non-productive  Indication for Bronchodilator Therapy:    Bronchodilator Assessment Score: 6    Aerosolized bronchodilator medication orders have been revised according to the RT Inhaler-Nebulizer Bronchodilator Protocol below.    Respiratory Therapist to perform RT Therapy Protocol Assessment initially then follow the protocol.  Repeat RT Therapy Protocol Assessment PRN for score 0-3 or on second treatment, BID, and PRN for scores above 3.    No Indications - adjust the frequency to every 6 hours PRN wheezing or bronchospasm, if no treatments needed after 48 hours then discontinue using Per Protocol order mode.     If indication present, adjust the RT bronchodilator orders based on the Bronchodilator Assessment Score as indicated below.  Use Inhaler orders unless patient has one or more of the following: on home nebulizer, not able to hold breath for 10 seconds, is not alert and oriented, cannot activate and use MDI correctly, or respiratory rate 25 breaths per minute or more, then use the equivalent nebulizer order(s) with same Frequency and PRN reasons based on the score.  If a patient is on this medication at home then do not decrease Frequency below that used at home.    0-3 - enter or revise RT bronchodilator order(s) to equivalent RT Bronchodilator order with Frequency of every 4 hours PRN for wheezing or

## 2024-01-12 NOTE — CONSULTS
Consult History & Physical      Patient:  Phi Sanz  YOB: 1950  MRN: [id number]     Acct: [id number]    Chief Complaint:  Possible GIB      Date of Service: Pt seen/examined in consultation on 1/12/2024    History Of Present Illness:      73 y.o. male who we are asked to see/evaluate by Melisa Rogel APRN* for medical management of possible GIB. HPI from patient and chart review. Patient was admitted 12/30/23 as transfer from [facility] where he presented for mid abdominal pain and diarrhea. Patient was noted to have worsening kidney function and was transferred for nephrology services. Patient was noted to have a perforate appendix and underwent a ileocecectomy 01/02/24 with AVELINA drain removed 01/08/24. Patient has been awaiting nursing home placement. Patient has a history of chronic anemia and was noted to have a drop in Hgb yesterday requiring 1 unit PRBC. Most recent Hgb 7.5. Patient denies prior history of GIB. He states \"he does not look at his stools.\" Nursing is reporting the patient having a couple bloody red stools starting yesterday. He endorses surgical abdominal pain, denies other abdominal pain. Denies nausea, vomiting, and constipation. Patient endorses diarrhea that has been ongoing for \"years.\" Initial stool panel positive for e coli enteropathogenic on 12/30/23. Repeat stool panel 01/10/24 was negative. Patient denies straining and denies improvement in his diarrhea. He had a colonoscopy last year at [facility] that he says was normal. Patient states he had it done because \"his doctor thought it was a good idea.\" He is unsure if he has ever had an EGD.    Past Medical History:    Past Medical History:   Diagnosis Date    A-fib (HCC)     Chronic pain syndrome     CVA (cerebral vascular accident) (HCC)     Diabetes mellitus (HCC)     Heart murmur     Hyperlipidemia        Home Medications:  Prior to Admission medications    Medication Sig Start Date End Date Taking?

## 2024-01-12 NOTE — DISCHARGE INSTR - COC
Continuity of Care Form    Patient Name: Phi Sanz   :  1950  MRN:  [id number]    Admit date:  2023  Discharge date:  24    Code Status Order: Limited   Advance Directives:     Admitting Physician:  No admitting provider for patient encounter.  PCP: Kaushal Ramsay MD    Discharging Nurse: Natali MUSTAFA RN  Discharging Hospital Unit/Room#: 8A-12/012-A  Discharging Unit Phone Number: [phone]    Emergency Contact:   Extended Emergency Contact Information  Primary Emergency Contact: Fern Sanz  Home Phone: [phone]  Relation: Spouse   needed? No  Secondary Emergency Contact: Ken aSnz  Home Phone: [phone]  Relation: Child    Past Surgical History:  Past Surgical History:   Procedure Laterality Date    CATARACT REMOVAL Bilateral     CIRCUMCISION      HEMICOLECTOMY N/A 2024    Right Colon Resection, ileocecectomy, appendectomy performed by Chaitanya Noble MD at [facility] OR    SHOULDER SURGERY      TONSILLECTOMY      UPPER GASTROINTESTINAL ENDOSCOPY         Immunization History:   Immunization History   Administered Date(s) Administered    Influenza, FLUAD, (age 65 y+), Adjuvanted, 0.5mL 10/27/2021    Pneumococcal, PCV-13, PREVNAR 13, (age 6w+), IM, 0.5mL 2017    Pneumococcal, PPSV23, PNEUMOVAX 23, (age 2y+), SC/IM, 0.5mL 2018    TDaP, ADACEL (age 10y-64y), BOOSTRIX (age 10y+), IM, 0.5mL 2018, 2021       Active Problems:  Patient Active Problem List   Diagnosis Code    Spondylosis of cervical spine M47.812    Overweight with body mass index (BMI) 25.0-29.9 E66.3    Mixed anxiety depressive disorder F41.8    Diabetes mellitus (HCC) E11.9    Diabetic nephropathy (HCC) E11.21    Chronic pain syndrome G89.4    Carotid bruit R09.89    Benign essential hypertension I10    PAF (paroxysmal atrial fibrillation) ([facility]) I48.0    Aortic valve stenosis I35.0    Anemia due to stage 3 chronic kidney disease (HCC) N18.30, D63.1    Acquired hammer toe of

## 2024-01-12 NOTE — PALLIATIVE CARE
Follow Up / Progress Note        Patient:   Phi Sanz  YOB: 1950  Age:  73 y.o.  Room:  [facility]12/[facility]  MRN:  [id number]         Family/Patient Discussion:  Patient resting in bed.  Primary RN at the bedside.  Patient is alert and oriented to all spheres.  Palliative care introduced.  Discussion about code status levels and what each level entails.  Discussed complications of rib fractures, brain and organ damage associated with resuscitative measures.  Patient shakes his head no to full code as this nurse was describing the different levels.  Patient states that he has a DNR at home.  Patient does not want resuscitated if his heart or breathing were to stop.  Patient does not want intubation for respiratory failure/distress.  Patient would prefer comfort at that time.  Patient believes that his wife knows his wishes but patient does give this RN consent to call his wife.  Patient indicates that his wife does not awaken until noon and this RN will wait until later today to reach out to her.  Discussed [place] DNR and its usage.  Emotional support provided.      Plan/Follow-Up:  Primary RN aware of code status change as she was present in the room with conversation.  Updated Melisa Rogel CNP.  [place] DNR form and prescription to indicate do not intubate completed and original and 2 copies provided to patient.  Copy placed in paper chart.  Copy faxed to medical records.  Please call palliative care if further needs arise.         Electronically signed by Shaista Fuentes RN on 1/12/2024 at 8:52 AM             Palliative Care Office: [phone]   
Extensive disease; Total care; intake reduced; LOC full/confusion    [] 20%  Bed Bound; Extensive disease; Total care; intake minimal; Drowsy/coma    [] 10%  Bed Bound; Extensive disease; Total care; Mouth care only; Drowsy/coma               Goals of care evaluation:-          Goals of care discussed with:  [] Patient independently  [] Patient and Family  [] Family or Healthcare DPOA independently  [x] Unable to discuss with patient, family/DPOA not present      History:-    Patient with history of CKD, DM, pAfib, severe aortic stenosis, CHF, HLD, hypothyroidism, GERD and BPH.  Patient admitted with perforated appendix with surgery on 01/02/2024.          Family/Patient Discussion:-    1120  Patient resting with eyes closed and no family at the bedside.  1345  Patient resting quietly with eyes closed.  Primary RN at the bedside and she indicates that the patient sleeps all of the time.  When asked if this RN could wake the patient, primary RN indicates that the patient does not appreciate being woke up.  Primary RN to call palliative care if/when patient awakens today.          Plan/Follow-Up:-    Palliative care will attempt to meet with patient later this afternoon if time permits.  If patient not seen today, Palliative care will follow up tomorrow.           Electronically signed by Shaista Fuentes RN on 1/11/2024 at 3:01 PM           Palliative Care Office: [phone]

## 2024-01-13 LAB
ANION GAP SERPL CALC-SCNC: 12 MEQ/L (ref 8–16)
BUN SERPL-MCNC: 30 MG/DL (ref 7–22)
CALCIUM SERPL-MCNC: 7.9 MG/DL (ref 8.5–10.5)
CHLORIDE SERPL-SCNC: 105 MEQ/L (ref 98–111)
CO2 SERPL-SCNC: 19 MEQ/L (ref 23–33)
CREAT SERPL-MCNC: 3.2 MG/DL (ref 0.4–1.2)
DEPRECATED RDW RBC AUTO: 48.5 FL (ref 35–45)
ERYTHROCYTE [DISTWIDTH] IN BLOOD BY AUTOMATED COUNT: 13.5 % (ref 11.5–14.5)
FERRITIN SERPL IA-MCNC: 1047 NG/ML (ref 22–322)
FOLATE SERPL-MCNC: 18.2 NG/ML (ref 4.8–24.2)
GFR SERPL CREATININE-BSD FRML MDRD: 20 ML/MIN/1.73M2
GLUCOSE BLD STRIP.AUTO-MCNC: 110 MG/DL (ref 70–108)
GLUCOSE BLD STRIP.AUTO-MCNC: 117 MG/DL (ref 70–108)
GLUCOSE BLD STRIP.AUTO-MCNC: 159 MG/DL (ref 70–108)
GLUCOSE BLD STRIP.AUTO-MCNC: 175 MG/DL (ref 70–108)
GLUCOSE SERPL-MCNC: 95 MG/DL (ref 70–108)
HCT VFR BLD AUTO: 24.3 % (ref 42–52)
HCT VFR BLD AUTO: 25 % (ref 42–52)
HGB BLD-MCNC: 8.1 GM/DL (ref 14–18)
HGB BLD-MCNC: 8.4 GM/DL (ref 14–18)
HGB RETIC QN AUTO: 26.4 PG (ref 28.2–35.7)
IMM RETICS NFR: 16.5 % (ref 2.3–13.4)
IRON SATN MFR SERPL: 11 % (ref 20–50)
IRON SERPL-MCNC: 17 UG/DL (ref 65–195)
MCH RBC QN AUTO: 32.7 PG (ref 26–33)
MCHC RBC AUTO-ENTMCNC: 33.6 GM/DL (ref 32.2–35.5)
MCV RBC AUTO: 97.3 FL (ref 80–94)
PHOSPHATE SERPL-MCNC: 3.3 MG/DL (ref 2.4–4.7)
PLATELET # BLD AUTO: 238 THOU/MM3 (ref 130–400)
PMV BLD AUTO: 10.9 FL (ref 9.4–12.4)
POTASSIUM SERPL-SCNC: 4.3 MEQ/L (ref 3.5–5.2)
RBC # BLD AUTO: 2.57 MILL/MM3 (ref 4.7–6.1)
RETICS # AUTO: 32 THOU/MM3 (ref 20–115)
RETICS/RBC NFR AUTO: 1.2 % (ref 0.5–2)
SODIUM SERPL-SCNC: 136 MEQ/L (ref 135–145)
TIBC SERPL-MCNC: 151 UG/DL (ref 171–450)
VIT B12 SERPL-MCNC: 766 PG/ML (ref 211–911)
WBC # BLD AUTO: 8.3 THOU/MM3 (ref 4.8–10.8)

## 2024-01-13 PROCEDURE — 85014 HEMATOCRIT: CPT

## 2024-01-13 PROCEDURE — 36415 COLL VENOUS BLD VENIPUNCTURE: CPT

## 2024-01-13 PROCEDURE — 6370000000 HC RX 637 (ALT 250 FOR IP): Performed by: INTERNAL MEDICINE

## 2024-01-13 PROCEDURE — 83540 ASSAY OF IRON: CPT

## 2024-01-13 PROCEDURE — 85018 HEMOGLOBIN: CPT

## 2024-01-13 PROCEDURE — 1200000000 HC SEMI PRIVATE

## 2024-01-13 PROCEDURE — 82948 REAGENT STRIP/BLOOD GLUCOSE: CPT

## 2024-01-13 PROCEDURE — 6370000000 HC RX 637 (ALT 250 FOR IP): Performed by: NURSE PRACTITIONER

## 2024-01-13 PROCEDURE — 2580000003 HC RX 258: Performed by: SURGERY

## 2024-01-13 PROCEDURE — 85027 COMPLETE CBC AUTOMATED: CPT

## 2024-01-13 PROCEDURE — 83550 IRON BINDING TEST: CPT

## 2024-01-13 PROCEDURE — 82607 VITAMIN B-12: CPT

## 2024-01-13 PROCEDURE — 80048 BASIC METABOLIC PNL TOTAL CA: CPT

## 2024-01-13 PROCEDURE — 82746 ASSAY OF FOLIC ACID SERUM: CPT

## 2024-01-13 PROCEDURE — 6370000000 HC RX 637 (ALT 250 FOR IP): Performed by: SURGERY

## 2024-01-13 PROCEDURE — 99232 SBSQ HOSP IP/OBS MODERATE 35: CPT | Performed by: NURSE PRACTITIONER

## 2024-01-13 PROCEDURE — 6370000000 HC RX 637 (ALT 250 FOR IP): Performed by: FAMILY MEDICINE

## 2024-01-13 PROCEDURE — 85046 RETICYTE/HGB CONCENTRATE: CPT

## 2024-01-13 PROCEDURE — 84100 ASSAY OF PHOSPHORUS: CPT

## 2024-01-13 PROCEDURE — 99232 SBSQ HOSP IP/OBS MODERATE 35: CPT | Performed by: INTERNAL MEDICINE

## 2024-01-13 PROCEDURE — 82728 ASSAY OF FERRITIN: CPT

## 2024-01-13 RX ORDER — CALCIUM POLYCARBOPHIL 625 MG 625 MG/1
625 TABLET ORAL DAILY
Refills: 2 | DISCHARGE
Start: 2024-01-14

## 2024-01-13 RX ORDER — PANTOPRAZOLE SODIUM 40 MG/1
40 TABLET, DELAYED RELEASE ORAL
Qty: 30 TABLET | Refills: 2 | DISCHARGE
Start: 2024-01-14

## 2024-01-13 RX ADMIN — ATORVASTATIN CALCIUM 10 MG: 10 TABLET, FILM COATED ORAL at 20:46

## 2024-01-13 RX ADMIN — SODIUM BICARBONATE 1300 MG: 650 TABLET ORAL at 20:45

## 2024-01-13 RX ADMIN — PANTOPRAZOLE SODIUM 40 MG: 40 TABLET, DELAYED RELEASE ORAL at 07:57

## 2024-01-13 RX ADMIN — QUETIAPINE FUMARATE 100 MG: 100 TABLET ORAL at 20:46

## 2024-01-13 RX ADMIN — APIXABAN 5 MG: 5 TABLET, FILM COATED ORAL at 20:46

## 2024-01-13 RX ADMIN — SODIUM BICARBONATE 1300 MG: 650 TABLET ORAL at 07:55

## 2024-01-13 RX ADMIN — BUSPIRONE HYDROCHLORIDE 10 MG: 10 TABLET ORAL at 20:46

## 2024-01-13 RX ADMIN — QUETIAPINE FUMARATE 100 MG: 100 TABLET ORAL at 07:55

## 2024-01-13 RX ADMIN — AMLODIPINE BESYLATE 5 MG: 5 TABLET ORAL at 07:59

## 2024-01-13 RX ADMIN — APIXABAN 5 MG: 5 TABLET, FILM COATED ORAL at 13:04

## 2024-01-13 RX ADMIN — BUSPIRONE HYDROCHLORIDE 10 MG: 10 TABLET ORAL at 07:55

## 2024-01-13 RX ADMIN — INSULIN GLARGINE 20 UNITS: 100 INJECTION, SOLUTION SUBCUTANEOUS at 23:53

## 2024-01-13 RX ADMIN — LEVOTHYROXINE SODIUM 50 MCG: 0.05 TABLET ORAL at 07:57

## 2024-01-13 RX ADMIN — SODIUM CHLORIDE, PRESERVATIVE FREE 10 ML: 5 INJECTION INTRAVENOUS at 20:45

## 2024-01-13 RX ADMIN — Medication 400 MG: at 07:55

## 2024-01-13 RX ADMIN — TAMSULOSIN HYDROCHLORIDE 0.4 MG: 0.4 CAPSULE ORAL at 07:54

## 2024-01-13 RX ADMIN — CALCIUM POLYCARBOPHIL 625 MG: 625 TABLET, FILM COATED ORAL at 07:56

## 2024-01-13 RX ADMIN — AMIODARONE HYDROCHLORIDE 100 MG: 200 TABLET ORAL at 07:55

## 2024-01-13 RX ADMIN — Medication 400 MG: at 20:46

## 2024-01-13 RX ADMIN — GABAPENTIN 300 MG: 600 TABLET, FILM COATED ORAL at 20:46

## 2024-01-14 LAB
ANION GAP SERPL CALC-SCNC: 8 MEQ/L (ref 8–16)
BUN SERPL-MCNC: 30 MG/DL (ref 7–22)
CALCIUM SERPL-MCNC: 7.8 MG/DL (ref 8.5–10.5)
CHLORIDE SERPL-SCNC: 107 MEQ/L (ref 98–111)
CO2 SERPL-SCNC: 20 MEQ/L (ref 23–33)
CREAT SERPL-MCNC: 3.2 MG/DL (ref 0.4–1.2)
DEPRECATED RDW RBC AUTO: 46.5 FL (ref 35–45)
ERYTHROCYTE [DISTWIDTH] IN BLOOD BY AUTOMATED COUNT: 13.1 % (ref 11.5–14.5)
GFR SERPL CREATININE-BSD FRML MDRD: 20 ML/MIN/1.73M2
GLUCOSE BLD STRIP.AUTO-MCNC: 126 MG/DL (ref 70–108)
GLUCOSE BLD STRIP.AUTO-MCNC: 158 MG/DL (ref 70–108)
GLUCOSE BLD STRIP.AUTO-MCNC: 159 MG/DL (ref 70–108)
GLUCOSE BLD STRIP.AUTO-MCNC: 187 MG/DL (ref 70–108)
GLUCOSE SERPL-MCNC: 124 MG/DL (ref 70–108)
HCT VFR BLD AUTO: 22.2 % (ref 42–52)
HCT VFR BLD AUTO: 23.5 % (ref 42–52)
HCT VFR BLD AUTO: 23.7 % (ref 42–52)
HGB BLD-MCNC: 7.1 GM/DL (ref 14–18)
HGB BLD-MCNC: 7.7 GM/DL (ref 14–18)
HGB BLD-MCNC: 7.9 GM/DL (ref 14–18)
MCH RBC QN AUTO: 31.3 PG (ref 26–33)
MCHC RBC AUTO-ENTMCNC: 32 GM/DL (ref 32.2–35.5)
MCV RBC AUTO: 97.8 FL (ref 80–94)
PHOSPHATE SERPL-MCNC: 3.6 MG/DL (ref 2.4–4.7)
PLATELET # BLD AUTO: 251 THOU/MM3 (ref 130–400)
PMV BLD AUTO: 11.1 FL (ref 9.4–12.4)
POTASSIUM SERPL-SCNC: 3.7 MEQ/L (ref 3.5–5.2)
RBC # BLD AUTO: 2.27 MILL/MM3 (ref 4.7–6.1)
SODIUM SERPL-SCNC: 135 MEQ/L (ref 135–145)
WBC # BLD AUTO: 7 THOU/MM3 (ref 4.8–10.8)

## 2024-01-14 PROCEDURE — 99233 SBSQ HOSP IP/OBS HIGH 50: CPT | Performed by: NURSE PRACTITIONER

## 2024-01-14 PROCEDURE — 85014 HEMATOCRIT: CPT

## 2024-01-14 PROCEDURE — 6370000000 HC RX 637 (ALT 250 FOR IP): Performed by: SURGERY

## 2024-01-14 PROCEDURE — 6370000000 HC RX 637 (ALT 250 FOR IP): Performed by: INTERNAL MEDICINE

## 2024-01-14 PROCEDURE — 6370000000 HC RX 637 (ALT 250 FOR IP): Performed by: NURSE PRACTITIONER

## 2024-01-14 PROCEDURE — 36415 COLL VENOUS BLD VENIPUNCTURE: CPT

## 2024-01-14 PROCEDURE — 1200000000 HC SEMI PRIVATE

## 2024-01-14 PROCEDURE — 85027 COMPLETE CBC AUTOMATED: CPT

## 2024-01-14 PROCEDURE — 82948 REAGENT STRIP/BLOOD GLUCOSE: CPT

## 2024-01-14 PROCEDURE — 80048 BASIC METABOLIC PNL TOTAL CA: CPT

## 2024-01-14 PROCEDURE — 84100 ASSAY OF PHOSPHORUS: CPT

## 2024-01-14 PROCEDURE — 99232 SBSQ HOSP IP/OBS MODERATE 35: CPT | Performed by: INTERNAL MEDICINE

## 2024-01-14 PROCEDURE — 85018 HEMOGLOBIN: CPT

## 2024-01-14 PROCEDURE — 6370000000 HC RX 637 (ALT 250 FOR IP): Performed by: FAMILY MEDICINE

## 2024-01-14 PROCEDURE — 2580000003 HC RX 258: Performed by: SURGERY

## 2024-01-14 RX ADMIN — SODIUM CHLORIDE, PRESERVATIVE FREE 10 ML: 5 INJECTION INTRAVENOUS at 20:28

## 2024-01-14 RX ADMIN — TAMSULOSIN HYDROCHLORIDE 0.4 MG: 0.4 CAPSULE ORAL at 10:13

## 2024-01-14 RX ADMIN — SODIUM CHLORIDE, PRESERVATIVE FREE 10 ML: 5 INJECTION INTRAVENOUS at 10:14

## 2024-01-14 RX ADMIN — LEVOTHYROXINE SODIUM 50 MCG: 0.05 TABLET ORAL at 05:27

## 2024-01-14 RX ADMIN — PANTOPRAZOLE SODIUM 40 MG: 40 TABLET, DELAYED RELEASE ORAL at 05:27

## 2024-01-14 RX ADMIN — GABAPENTIN 300 MG: 600 TABLET, FILM COATED ORAL at 20:27

## 2024-01-14 RX ADMIN — QUETIAPINE FUMARATE 100 MG: 100 TABLET ORAL at 20:28

## 2024-01-14 RX ADMIN — AMLODIPINE BESYLATE 5 MG: 5 TABLET ORAL at 10:13

## 2024-01-14 RX ADMIN — CALCIUM POLYCARBOPHIL 625 MG: 625 TABLET, FILM COATED ORAL at 10:13

## 2024-01-14 RX ADMIN — Medication 400 MG: at 20:28

## 2024-01-14 RX ADMIN — QUETIAPINE FUMARATE 100 MG: 100 TABLET ORAL at 10:12

## 2024-01-14 RX ADMIN — SODIUM BICARBONATE 1300 MG: 650 TABLET ORAL at 10:13

## 2024-01-14 RX ADMIN — Medication 400 MG: at 10:13

## 2024-01-14 RX ADMIN — SODIUM BICARBONATE 1300 MG: 650 TABLET ORAL at 20:28

## 2024-01-14 RX ADMIN — AMIODARONE HYDROCHLORIDE 100 MG: 200 TABLET ORAL at 10:12

## 2024-01-14 RX ADMIN — BUSPIRONE HYDROCHLORIDE 10 MG: 10 TABLET ORAL at 10:13

## 2024-01-14 RX ADMIN — BUSPIRONE HYDROCHLORIDE 10 MG: 10 TABLET ORAL at 20:27

## 2024-01-14 RX ADMIN — ATORVASTATIN CALCIUM 10 MG: 10 TABLET, FILM COATED ORAL at 20:27

## 2024-01-14 RX ADMIN — INSULIN GLARGINE 20 UNITS: 100 INJECTION, SOLUTION SUBCUTANEOUS at 20:35

## 2024-01-15 LAB
ANION GAP SERPL CALC-SCNC: 9 MEQ/L (ref 8–16)
BUN SERPL-MCNC: 29 MG/DL (ref 7–22)
CALCIUM SERPL-MCNC: 8 MG/DL (ref 8.5–10.5)
CHLORIDE SERPL-SCNC: 107 MEQ/L (ref 98–111)
CO2 SERPL-SCNC: 22 MEQ/L (ref 23–33)
CREAT SERPL-MCNC: 3 MG/DL (ref 0.4–1.2)
DEPRECATED RDW RBC AUTO: 45.3 FL (ref 35–45)
ERYTHROCYTE [DISTWIDTH] IN BLOOD BY AUTOMATED COUNT: 12.6 % (ref 11.5–14.5)
GFR SERPL CREATININE-BSD FRML MDRD: 21 ML/MIN/1.73M2
GLUCOSE BLD STRIP.AUTO-MCNC: 136 MG/DL (ref 70–108)
GLUCOSE BLD STRIP.AUTO-MCNC: 152 MG/DL (ref 70–108)
GLUCOSE BLD STRIP.AUTO-MCNC: 160 MG/DL (ref 70–108)
GLUCOSE BLD STRIP.AUTO-MCNC: 201 MG/DL (ref 70–108)
GLUCOSE SERPL-MCNC: 166 MG/DL (ref 70–108)
HCT VFR BLD AUTO: 24.6 % (ref 42–52)
HCT VFR BLD AUTO: 25.1 % (ref 42–52)
HGB BLD-MCNC: 7.9 GM/DL (ref 14–18)
HGB BLD-MCNC: 8 GM/DL (ref 14–18)
MCH RBC QN AUTO: 31.6 PG (ref 26–33)
MCHC RBC AUTO-ENTMCNC: 32.5 GM/DL (ref 32.2–35.5)
MCV RBC AUTO: 97.2 FL (ref 80–94)
PHOSPHATE SERPL-MCNC: 3.6 MG/DL (ref 2.4–4.7)
PLATELET # BLD AUTO: 252 THOU/MM3 (ref 130–400)
PMV BLD AUTO: 10.9 FL (ref 9.4–12.4)
POTASSIUM SERPL-SCNC: 3.6 MEQ/L (ref 3.5–5.2)
RBC # BLD AUTO: 2.53 MILL/MM3 (ref 4.7–6.1)
SODIUM SERPL-SCNC: 138 MEQ/L (ref 135–145)
WBC # BLD AUTO: 7 THOU/MM3 (ref 4.8–10.8)

## 2024-01-15 PROCEDURE — 85018 HEMOGLOBIN: CPT

## 2024-01-15 PROCEDURE — 6370000000 HC RX 637 (ALT 250 FOR IP): Performed by: NURSE PRACTITIONER

## 2024-01-15 PROCEDURE — 36415 COLL VENOUS BLD VENIPUNCTURE: CPT

## 2024-01-15 PROCEDURE — 82948 REAGENT STRIP/BLOOD GLUCOSE: CPT

## 2024-01-15 PROCEDURE — 99232 SBSQ HOSP IP/OBS MODERATE 35: CPT | Performed by: NURSE PRACTITIONER

## 2024-01-15 PROCEDURE — 6370000000 HC RX 637 (ALT 250 FOR IP): Performed by: FAMILY MEDICINE

## 2024-01-15 PROCEDURE — 6370000000 HC RX 637 (ALT 250 FOR IP): Performed by: SURGERY

## 2024-01-15 PROCEDURE — 85027 COMPLETE CBC AUTOMATED: CPT

## 2024-01-15 PROCEDURE — 6370000000 HC RX 637 (ALT 250 FOR IP): Performed by: INTERNAL MEDICINE

## 2024-01-15 PROCEDURE — 85014 HEMATOCRIT: CPT

## 2024-01-15 PROCEDURE — 99232 SBSQ HOSP IP/OBS MODERATE 35: CPT | Performed by: INTERNAL MEDICINE

## 2024-01-15 PROCEDURE — 84100 ASSAY OF PHOSPHORUS: CPT

## 2024-01-15 PROCEDURE — 80048 BASIC METABOLIC PNL TOTAL CA: CPT

## 2024-01-15 PROCEDURE — 2580000003 HC RX 258: Performed by: SURGERY

## 2024-01-15 PROCEDURE — 1200000000 HC SEMI PRIVATE

## 2024-01-15 RX ADMIN — LEVOTHYROXINE SODIUM 50 MCG: 0.05 TABLET ORAL at 06:38

## 2024-01-15 RX ADMIN — SODIUM BICARBONATE 1300 MG: 650 TABLET ORAL at 20:26

## 2024-01-15 RX ADMIN — AMIODARONE HYDROCHLORIDE 100 MG: 200 TABLET ORAL at 08:55

## 2024-01-15 RX ADMIN — GABAPENTIN 300 MG: 600 TABLET, FILM COATED ORAL at 20:26

## 2024-01-15 RX ADMIN — QUETIAPINE FUMARATE 100 MG: 100 TABLET ORAL at 20:27

## 2024-01-15 RX ADMIN — CALCIUM POLYCARBOPHIL 625 MG: 625 TABLET, FILM COATED ORAL at 08:53

## 2024-01-15 RX ADMIN — INSULIN GLARGINE 20 UNITS: 100 INJECTION, SOLUTION SUBCUTANEOUS at 20:26

## 2024-01-15 RX ADMIN — AMLODIPINE BESYLATE 5 MG: 5 TABLET ORAL at 08:55

## 2024-01-15 RX ADMIN — SODIUM BICARBONATE 1300 MG: 650 TABLET ORAL at 08:54

## 2024-01-15 RX ADMIN — SODIUM CHLORIDE, PRESERVATIVE FREE 10 ML: 5 INJECTION INTRAVENOUS at 20:28

## 2024-01-15 RX ADMIN — Medication 400 MG: at 20:26

## 2024-01-15 RX ADMIN — TAMSULOSIN HYDROCHLORIDE 0.4 MG: 0.4 CAPSULE ORAL at 08:55

## 2024-01-15 RX ADMIN — ATORVASTATIN CALCIUM 10 MG: 10 TABLET, FILM COATED ORAL at 20:27

## 2024-01-15 RX ADMIN — PANTOPRAZOLE SODIUM 40 MG: 40 TABLET, DELAYED RELEASE ORAL at 06:38

## 2024-01-15 RX ADMIN — Medication 400 MG: at 08:53

## 2024-01-15 RX ADMIN — QUETIAPINE FUMARATE 100 MG: 100 TABLET ORAL at 08:53

## 2024-01-15 RX ADMIN — SODIUM CHLORIDE, PRESERVATIVE FREE 10 ML: 5 INJECTION INTRAVENOUS at 08:55

## 2024-01-15 RX ADMIN — BUSPIRONE HYDROCHLORIDE 10 MG: 10 TABLET ORAL at 08:53

## 2024-01-15 RX ADMIN — BUSPIRONE HYDROCHLORIDE 10 MG: 10 TABLET ORAL at 20:27

## 2024-01-15 RX ADMIN — INSULIN LISPRO 2 UNITS: 100 INJECTION, SOLUTION INTRAVENOUS; SUBCUTANEOUS at 17:01

## 2024-01-15 ASSESSMENT — PAIN SCALES - WONG BAKER
WONGBAKER_NUMERICALRESPONSE: 0
WONGBAKER_NUMERICALRESPONSE: 0

## 2024-01-15 ASSESSMENT — PAIN SCALES - GENERAL
PAINLEVEL_OUTOF10: 0
PAINLEVEL_OUTOF10: 0

## 2024-01-16 VITALS
HEART RATE: 83 BPM | BODY MASS INDEX: 23.08 KG/M2 | WEIGHT: 170.42 LBS | HEIGHT: 72 IN | RESPIRATION RATE: 18 BRPM | SYSTOLIC BLOOD PRESSURE: 105 MMHG | OXYGEN SATURATION: 96 % | TEMPERATURE: 98 F | DIASTOLIC BLOOD PRESSURE: 63 MMHG

## 2024-01-16 LAB
ANION GAP SERPL CALC-SCNC: 9 MEQ/L (ref 8–16)
BUN SERPL-MCNC: 27 MG/DL (ref 7–22)
CALCIUM SERPL-MCNC: 8 MG/DL (ref 8.5–10.5)
CHLORIDE SERPL-SCNC: 107 MEQ/L (ref 98–111)
CO2 SERPL-SCNC: 22 MEQ/L (ref 23–33)
CREAT SERPL-MCNC: 3.2 MG/DL (ref 0.4–1.2)
DEPRECATED RDW RBC AUTO: 44.3 FL (ref 35–45)
ERYTHROCYTE [DISTWIDTH] IN BLOOD BY AUTOMATED COUNT: 12.7 % (ref 11.5–14.5)
GFR SERPL CREATININE-BSD FRML MDRD: 20 ML/MIN/1.73M2
GLUCOSE BLD STRIP.AUTO-MCNC: 125 MG/DL (ref 70–108)
GLUCOSE BLD STRIP.AUTO-MCNC: 182 MG/DL (ref 70–108)
GLUCOSE BLD STRIP.AUTO-MCNC: 214 MG/DL (ref 70–108)
GLUCOSE SERPL-MCNC: 133 MG/DL (ref 70–108)
HCT VFR BLD AUTO: 25.5 % (ref 42–52)
HCT VFR BLD AUTO: 26.6 % (ref 42–52)
HGB BLD-MCNC: 8.4 GM/DL (ref 14–18)
HGB BLD-MCNC: 8.7 GM/DL (ref 14–18)
MCH RBC QN AUTO: 31.5 PG (ref 26–33)
MCHC RBC AUTO-ENTMCNC: 32.9 GM/DL (ref 32.2–35.5)
MCV RBC AUTO: 95.5 FL (ref 80–94)
PHOSPHATE SERPL-MCNC: 3.4 MG/DL (ref 2.4–4.7)
PLATELET # BLD AUTO: 265 THOU/MM3 (ref 130–400)
PMV BLD AUTO: 10.7 FL (ref 9.4–12.4)
POTASSIUM SERPL-SCNC: 3.7 MEQ/L (ref 3.5–5.2)
RBC # BLD AUTO: 2.67 MILL/MM3 (ref 4.7–6.1)
SODIUM SERPL-SCNC: 138 MEQ/L (ref 135–145)
WBC # BLD AUTO: 9.7 THOU/MM3 (ref 4.8–10.8)

## 2024-01-16 PROCEDURE — 99232 SBSQ HOSP IP/OBS MODERATE 35: CPT | Performed by: INTERNAL MEDICINE

## 2024-01-16 PROCEDURE — 97530 THERAPEUTIC ACTIVITIES: CPT

## 2024-01-16 PROCEDURE — 84100 ASSAY OF PHOSPHORUS: CPT

## 2024-01-16 PROCEDURE — 82948 REAGENT STRIP/BLOOD GLUCOSE: CPT

## 2024-01-16 PROCEDURE — 80048 BASIC METABOLIC PNL TOTAL CA: CPT

## 2024-01-16 PROCEDURE — 6370000000 HC RX 637 (ALT 250 FOR IP): Performed by: FAMILY MEDICINE

## 2024-01-16 PROCEDURE — 6370000000 HC RX 637 (ALT 250 FOR IP): Performed by: NURSE PRACTITIONER

## 2024-01-16 PROCEDURE — 2580000003 HC RX 258: Performed by: SURGERY

## 2024-01-16 PROCEDURE — 6370000000 HC RX 637 (ALT 250 FOR IP): Performed by: SURGERY

## 2024-01-16 PROCEDURE — 97535 SELF CARE MNGMENT TRAINING: CPT

## 2024-01-16 PROCEDURE — 85027 COMPLETE CBC AUTOMATED: CPT

## 2024-01-16 PROCEDURE — 36415 COLL VENOUS BLD VENIPUNCTURE: CPT

## 2024-01-16 PROCEDURE — 6370000000 HC RX 637 (ALT 250 FOR IP): Performed by: INTERNAL MEDICINE

## 2024-01-16 RX ORDER — INSULIN GLARGINE 100 [IU]/ML
20 INJECTION, SOLUTION SUBCUTANEOUS NIGHTLY
Qty: 10 ML | Refills: 3 | DISCHARGE
Start: 2024-01-16

## 2024-01-16 RX ORDER — AMIODARONE HYDROCHLORIDE 100 MG/1
100 TABLET ORAL DAILY
DISCHARGE
Start: 2024-01-16

## 2024-01-16 RX ORDER — ACETAMINOPHEN 500 MG
500 TABLET ORAL EVERY 6 HOURS PRN
Qty: 120 TABLET | Refills: 3 | DISCHARGE
Start: 2024-01-16

## 2024-01-16 RX ORDER — AMLODIPINE BESYLATE 5 MG/1
5 TABLET ORAL DAILY
Qty: 30 TABLET | Refills: 3 | DISCHARGE
Start: 2024-01-16

## 2024-01-16 RX ORDER — ATORVASTATIN CALCIUM 10 MG/1
10 TABLET, FILM COATED ORAL NIGHTLY
Qty: 30 TABLET | Refills: 3 | DISCHARGE
Start: 2024-01-16

## 2024-01-16 RX ORDER — IPRATROPIUM BROMIDE AND ALBUTEROL SULFATE 2.5; .5 MG/3ML; MG/3ML
1 SOLUTION RESPIRATORY (INHALATION) EVERY 4 HOURS PRN
Status: DISCONTINUED | OUTPATIENT
Start: 2024-01-16 | End: 2024-01-16 | Stop reason: HOSPADM

## 2024-01-16 RX ORDER — TAMSULOSIN HYDROCHLORIDE 0.4 MG/1
0.4 CAPSULE ORAL DAILY
Qty: 30 CAPSULE | Refills: 3 | DISCHARGE
Start: 2024-01-16

## 2024-01-16 RX ORDER — LEVOTHYROXINE SODIUM 0.05 MG/1
50 TABLET ORAL DAILY
Qty: 30 TABLET | Refills: 3 | DISCHARGE
Start: 2024-01-17

## 2024-01-16 RX ORDER — LANOLIN ALCOHOL/MO/W.PET/CERES
400 CREAM (GRAM) TOPICAL 2 TIMES DAILY
Qty: 30 TABLET | DISCHARGE
Start: 2024-01-16

## 2024-01-16 RX ORDER — QUETIAPINE FUMARATE 100 MG/1
100 TABLET, FILM COATED ORAL 2 TIMES DAILY
Qty: 60 TABLET | Refills: 3 | DISCHARGE
Start: 2024-01-16

## 2024-01-16 RX ORDER — SODIUM BICARBONATE 650 MG/1
1300 TABLET ORAL 2 TIMES DAILY
DISCHARGE
Start: 2024-01-16

## 2024-01-16 RX ORDER — BUSPIRONE HYDROCHLORIDE 10 MG/1
10 TABLET ORAL 2 TIMES DAILY
DISCHARGE
Start: 2024-01-16

## 2024-01-16 RX ADMIN — AMLODIPINE BESYLATE 5 MG: 5 TABLET ORAL at 08:17

## 2024-01-16 RX ADMIN — TAMSULOSIN HYDROCHLORIDE 0.4 MG: 0.4 CAPSULE ORAL at 08:17

## 2024-01-16 RX ADMIN — PANTOPRAZOLE SODIUM 40 MG: 40 TABLET, DELAYED RELEASE ORAL at 05:32

## 2024-01-16 RX ADMIN — IPRATROPIUM BROMIDE AND ALBUTEROL SULFATE 1 DOSE: .5; 3 SOLUTION RESPIRATORY (INHALATION) at 08:08

## 2024-01-16 RX ADMIN — APIXABAN 5 MG: 5 TABLET, FILM COATED ORAL at 08:17

## 2024-01-16 RX ADMIN — BUSPIRONE HYDROCHLORIDE 10 MG: 10 TABLET ORAL at 08:16

## 2024-01-16 RX ADMIN — AMIODARONE HYDROCHLORIDE 100 MG: 200 TABLET ORAL at 08:17

## 2024-01-16 RX ADMIN — SODIUM CHLORIDE, PRESERVATIVE FREE 10 ML: 5 INJECTION INTRAVENOUS at 08:17

## 2024-01-16 RX ADMIN — LOPERAMIDE HYDROCHLORIDE 2 MG: 2 CAPSULE ORAL at 05:32

## 2024-01-16 RX ADMIN — Medication 400 MG: at 08:17

## 2024-01-16 RX ADMIN — SODIUM BICARBONATE 1300 MG: 650 TABLET ORAL at 08:17

## 2024-01-16 RX ADMIN — CALCIUM POLYCARBOPHIL 625 MG: 625 TABLET, FILM COATED ORAL at 08:17

## 2024-01-16 RX ADMIN — LEVOTHYROXINE SODIUM 50 MCG: 0.05 TABLET ORAL at 05:32

## 2024-01-16 RX ADMIN — QUETIAPINE FUMARATE 100 MG: 100 TABLET ORAL at 08:17

## 2024-01-16 ASSESSMENT — PAIN SCALES - GENERAL
PAINLEVEL_OUTOF10: 0
PAINLEVEL_OUTOF10: 0

## 2024-01-16 ASSESSMENT — PAIN SCALES - WONG BAKER
WONGBAKER_NUMERICALRESPONSE: 0
WONGBAKER_NUMERICALRESPONSE: 0

## 2024-01-16 NOTE — PROGRESS NOTES
Hospitalist Progress Note      Patient:  Phi Sanz    Unit/Bed:3B-32/032-A  YOB: 1950  MRN: [id number]   Acct: [id number]   PCP: Kaushal Ramsay MD  Date of Admission: 12/30/2023    Assessment/Plan:    Concern for ruptured appendicitis vs colitis  EPEC colitis  Possible paralytic ileus  Patient presenting with diarrheal illness. RLQ pain. Imaging concerning for ruptured appendicitis  Hemodynamically stable, afebrile, no leukocytosis. Still with diarrhea.  Surgery consulted, appreciate recommendations  Abd tense, tender. Repeat CT abd/pelvis with worsening findings  Discussed with Dr. Noble, plan for OR 1/2/2024   Continue zosyn    JOSE on CKD IV  Metabolic acidosis with respiratory compensation  hyponatremia  Patient presented with Cr > 6. Baseline around 3 previously  Nephrology consulted, appreciate recommendations  Cr stagnant at 5.7. no electrolyte derangements  No plan for RRT at this time,  Continue bicarb gtt  No evidence of hemolysis currently, HUS less likely    Thrombocytopenia  Normocytic anemia  Question of HUS  Has progressive thrombocytopenia. HIT score 3, low probability  PLT at baseline..  Labs for HUS negative    HTN  Hold norvasc, lisinopril    T2DM  Hyperglycemic  Start low dose lantus, prandial insulin, sliding scale    Chronic  pAF: hold DOAC. Heparin gtt. Rate control with amio  HLD: c/w statin    Diet: CLD  VTE Ppx: heparin gtt   PT/OT: pending   Code status: full  Disposition: > 2 days      Chief Complaint: diarrhea    Subjective (past 24 hours):   NAEON. Has persistent abd pain. No fevers, chills.     Past medical history, family history, social history and allergies reviewed again and is unchanged since admission.    ROS (All review of systems completed.  Pertinent positives noted. Otherwise All other systems reviewed and negative.)     Medications:  Reviewed    Infusion Medications    sodium 
              Hospitalist Progress Note    Patient:  Phi Sanz      Unit/Bed:8A-12/012-A    YOB: 1950    MRN: [id number]       Acct: [id number]     PCP: Kaushal Ramsay MD    Date of Admission: 12/30/2023    Assessment/Plan:    Perforated appendix with RLQ abscess s/p ileocecectomy 1/2/24--appreciate Dr. Noble input, Zosyn 12/31-1/8; AVELINA removed 1/8 per surgery  JOSE on CKD IV--appreciate nephrology input; most likely secondary to prerenal/ATN; creatinine 6.4 on admission 12/30 and on 1/14 decreased to 3.0; villalobos removed 1/8; holding home lisinopril; avoid nephrotoxins; per nephrology note from 1/10: Okay to discharge from renal standpoint, needs a 2-week follow-up with nephrology with BMP and CBC prior to the appointment and will remain off of lisinopril  Acute blood loss anemia--consent obtained on 1/11/2024, gave 1 unit packed red blood cells on 1/11 with improvement of hemoglobin, hemoglobin 8.0, had Retacrit x 1 dose on 1/11 per nephrology; checking H/H every 6 hours  Hematochezia--appreciate GI input, no plan for emergent endoscopy at this time; obtaining old records from EGD/colonoscopy per GI note dated 1/12; GI was okay with resuming Eliquis on 1/13 however with hemoglobin at 7.1 Eliquis back on hold from 1/14, hemoglobin noted to be 8.0; no further issues  Diarrhea--C. difficile and GI bacterial pathogen (-), Imodium as needed; stools have improved from patient but still soft  Hypomagnesia--replaced; monitor  Metabolic acidosis with respiratory compensation--improved, monitor  Leukocytosis--resolved  Hyponatremia--due to JOSE, resolved  Hypokalemia--stable  Hyperphosphatemia due to renal dysfunction--resolved  Hypocalcemia--replacement per renal with #2;  monitor  E coli enteropathogenic diarrhea per GI panel 12/30--asymptomatic  Thrombocytopenia--resolved  Acute on chronic macrocytic anemia--see #3; receiving Retacrit x 1 dose on 1/11  PAF with probable secondary hypercoagulable 
              Hospitalist Progress Note    Patient:  Phi Sanz      Unit/Bed:8A-12/012-A    YOB: 1950    MRN: [id number]       Acct: [id number]     PCP: Kaushal Ramsay MD    Date of Admission: 12/30/2023    Assessment/Plan:    Perforated appendix with RLQ abscess s/p ileocecectomy 1/2/24--appreciate Dr. Nolbe input, Zosyn 12/31-1/8; AVELINA removed 1/8 per surgery  JOSE on CKD IV--appreciate nephrology input; most likely secondary to prerenal/ATN; creatinine 6.4 on admission 12/30 and on 1/12 decreased to 3.4; villalobos removed 1/8; holding home lisinopril; avoid nephrotoxins; per nephrology note from 1/10: Okay to discharge from renal standpoint, needs a 2-week follow-up with nephrology with BMP and CBC prior to the appointment and will remain off of lisinopril  Acute blood loss anemia--consent obtained on 1/11/2024, gave 1 unit packed red blood cells on 1/11 with improvement of hemoglobin, hemoglobin at 8.2; monitor closely, had Retacrit x 1 dose on 1/11 per nephrology  Hematochezia--appreciate GI input, no plan for emergent endoscopy at this time; obtaining old records from EGD/colonoscopy per GI note dated 1/12  Diarrhea--C. difficile and GI bacterial pathogen (-), Imodium as needed; stools have improved from patient but still soft  Hypomagnesia--replaced; monitor  Metabolic acidosis with respiratory compensation--monitor  Leukocytosis--resolved  Hyponatremia--due to JOSE, resolved  Hypokalemia--stable  Hyperphosphatemia due to renal dysfunction--resolved  Hypocalcemia--replacement per renal with #2;  monitor  E coli enteropathogenic diarrhea per GI panel 12/30--asymptomatic  Thrombocytopenia--resolved  Acute on chronic macrocytic anemia--see #3; receiving Retacrit x 1 dose on 1/11  PAF with probable secondary hypercoagulable state--amiodarone 100 mg daily, eliquis on hold with #3  Mod/Severe AS, mild AR, trace MR, mild TR--noted per echo 2018 in care everywhere   Severe AS with CAREN 1.0 
      Pharmacy Renal Adjustment    Patient does not qualify for KCL and Magnesium replacement prn orders d/t renal fxn.  Please order as one time doses if needed.    Recent Labs     12/30/23  0612   BUN 63*   CREATININE 6.4*     Estimated Creatinine Clearance: 11 mL/min (A) (based on SCr of 6.4 mg/dL (HH)).      Please call pharmacy with any questions.    Mercedes Cote R.Ph., BCPS., 12/30/2023,8:42 AM      
   01/16/24 0811   RT Protocol   History Pulmonary Disease 0   Respiratory pattern 0   Breath sounds 2   Cough 0   Indications for Bronchodilator Therapy Decreased or absent breath sounds   Bronchodilator Assessment Score 2       
  Hospitalist Progress Note      Patient:  Phi Sanz      Unit/Bed:3B-32/032-A    YOB: 1950    MRN: [id number]       Acct: [id number]     PCP: Kaushal Ramsay MD    Date of Admission: 12/30/2023    Date/Time of Evaluation:  1/4/2024 at 10:07 AM    Assessment/Plan:    Perforated appendix with RLQ abscess s/p ileocecectomy 1/2/24 -- apprec Dr. Noble -- NGT removed 1/4 but cont NPO 1/4 per surgery -- cont zosyn started 12/31 -- monitor for bowel fxn -- IS to prevent PNA -- cont villalobos as need accurate I/O with #2  JOSE on CKD IV -- apprec renal assist -- initially dry then progressed due to ATN? -- creat 6.4 on admission 12/30 -> down to 5.7 on 1/1 and 1/2 and back up to 6.4 on 1/3 and stable 6.3 on 1/4 --> no uremic sx, good u/o -- cont villalobos for now for accurate I/O and monitor and fluid mgmt per renal -- IVF per renal changed to NS 1/4 from bicarb gtt  -- holding home lisinopril and cont avoid nephrotoxins  Metabolic acidosis -- with respiratory compensation -- CO2 trending down 1/3 to 11 on BMP - pH however 7.34 on 1/3 -- bicarb gtt per renal likely due to JOSE and #1, ?GI loss with diarrhea --> CO2 much improved 1/4 on BMP to 23 and IVF changed to NS per renal 1/4 -- LA normal 12/30 and 1/2  Hyponatremia -- due to JOSE - down to 132 on 1/1 but up to 134 on 1/3 and stable 1/4 (138 on admission 12/30) -- apprec renal assist - fluid mgmt per renal and changed from bicarb gtt to NS 1/4 -- asx - monitor   Hyperphosphatemia -- due to renal dysfxn - per renal  E coli enteropathogenic diarrhea -- per GI panel 12/30 - monitor stools post-op - ?HUS but less likely -- pt states has chronic issues with constipation diarrhea at home  PTA -- monitor  Thrombocytopenia -- acute drop in plts 121 on 12/31 from 140 on admission 12/30 but back up to WNL since 1/1/24  -- ?due to all above - HIT risk low -- monitor  Acute on chronic macrocytic anemia -- hgb down to 7.5 on 1/4 from 8's since 12/31 --> repeat 
  Hospitalist Progress Note      Patient:  Phi Sanz      Unit/Bed:3B-32/032-A    YOB: 1950    MRN: [id number]       Acct: [id number]     PCP: Kaushal Ramsay MD    Date of Admission: 12/30/2023    Date/Time of Evaluation:  1/8/2024 at 11:34 AM    Assessment/Plan:    Perforated appendix with RLQ abscess s/p ileocecectomy 1/2/24 -- apprec Dr. Noble -- NGT removed 1/4  -- continues on zosyn started 12/31 -- + Bms and jonny regular diet 1/8 -- IS to prevent PNA -- AVELINA to be removed 1/8 per surgery -- cont monitor  JOSE on CKD IV -- apprec renal assist -- initially dry then progressed due to ATN -- creat 6.4 on admission 12/30 -> down to 4.0 on 1/8/2024   --> no uremic sx, good u/o --  villalobos removed 1/8 and monitor I/O -- IVF stopped 1/7 -- monitor intake and renal fxn  -- holding home lisinopril and cont avoid nephrotoxins  Metabolic acidosis -- with respiratory compensation -- CO2 trending down 1/3 to 11 on BMP - pH however 7.34 on 1/3 -- LA normal 12/20 and 1/2 -- bicarb gtt initially started per renal likely due to JOSE and #1, ?GI loss with diarrhea --> CO2 much improved 1/4 on BMP to 23 and IVF changed to NS per renal 1/4 and Co2 stable and IV stopped 1/7 - monitor  Hyponatremia -- due to JOSE , fluid status per renal as above -- up and down - improved to WNL since 1/5  -- asx, monitor  Hypokalemia -- up and down- replacement per renal with #2 -- monitor and replace prn  Hyperphosphatemia -- due to renal dysfxn - improving to WNL since 1/5 -- cont monitor and mgmt per renal  Hypomagnesemia -- low 1.5 1/8 -- replace per renal -- monitor  Hypocalcemia -- replacement per renal with #2 - monitor  E coli enteropathogenic diarrhea -- per GI panel 12/30 - monitor stools post-op - ?HUS but less likely -- pt states has chronic issues with constipation diarrhea at home  PTA -- asx since surgery  Thrombocytopenia -- acute drop in plts 121 on 12/31 from 140 on admission 12/30 but back up to WNL since 
  Hospitalist Progress Note      Patient:  Phi Sanz      Unit/Bed:3B-32/032-A    YOB: 1950    MRN: [id number]       Acct: [id number]     PCP: Kaushal Ramsay MD    Date of Admission: 12/30/2023    Date/Time of Evaluation:  1/3/2024 at 11:19 AM    Assessment/Plan:    Perforated appendix with RLQ abscess s/p ileocecectomy 1/2/24 -- apprec Dr. Noble -- NGT in place as of 1/3/2024  -- cont zosyn started 12/31 -- monitor for bowel fxn -- add IS to prevent PNA, ??remove villalobos  JOSE on CKD IV -- apprec renal assist -- initially dry then progressed due to ATN? -- creat 6.4 on admission 12/30 -> down to 5.7 on 1/1/ and 1/2 and back up to 6.4 on 1/3 --> no uremic sx  but per renal note 1/3 may need HD if no improvement 1/4-- monitor and fluid mgmt per renal as on   -- holding home lisinopril and cont avoid   Metabolic acidosis -- with respiratory compensation -- CO2 trending down 1/3 to 11 on BMP - pH however 7.34 on 1/3 -- bicarb gtt per renal  likely due to JOSE and #1, ?GI loss with diarrhea -- LA normal 12/30 and 1/2  Hyponatremia -- due to JOSE - down to 132 on 1/1 but up to 134 on 1/3 (138 on admission 12/30) -- apprec renal assist - fluid mgmt per them -- asx - monitor   Hyperphosphatemia -- due to renal dysfxn - per renal  E coli enteropathogenic diarrhea -- per GI panel 12/30 - monitor stools post-op - ?HUS but less likely -- monitor  Thrombocytopenia -- acute drop in plts 121 on 12/31 from 140 on admission 12/30 but back up to WNL since 1/1/24  -- ?due to all above - HIT risk low -- monitor  Acute on chronic macrocytic anemia -- hgb down to 8's since 12/31 from 10.1 on admission - prior 11.5 on 8/2023 -- no overt signs of bleeding but ?related to #1 and IVF for #2 -- ?renal dysfxn contrib -- cont monitor - asx  -- iron studies low 12/31 -> po at d/c  PAF -- rate controlled/SR currently -- cont home amiodarone 100 mg daily -- home eliquis held for above - heparin gtt when ok with 
  Hospitalist Progress Note      Patient:  Phi Sanz      Unit/Bed:8A-12/012-A    YOB: 1950    MRN: [id number]       Acct: [id number]     PCP: Kaushal Ramsay MD    Date of Admission: 12/30/2023    Date/Time of Evaluation:  1/9/2024 at 7:36 AM    Assessment/Plan:    Perforated appendix with RLQ abscess s/p ileocecectomy 1/2/24 -- apprec Dr. Noble -- NGT removed 1/4  -- continues on zosyn started 12/31 and completed 1/8 -- + Bms and jonny regular diet 1/8 -- IS to prevent PNA -- AVELINA removed 1/8 per surgery -- cont monitor  JOSE on CKD IV -- apprec renal assist -- initially dry then progressed due to ATN -- creat 6.4 on admission 12/30 -> down to 3.9 on 1/9/2024   --> no uremic sx, good u/o --  villalobos removed 1/8 and monitor I/O -- IVF stopped 1/7 -- monitor intake and renal fxn  -- holding home lisinopril and cont avoid nephrotoxins  Metabolic acidosis -- with respiratory compensation -- CO2 trending down 1/3 to 11 on BMP - pH however 7.34 on 1/3 -- LA normal 12/20 and 1/2 -- bicarb gtt initially started per renal likely due to JOSE and #1, ?GI loss with diarrhea --> CO2 much improved 1/4 on BMP to 23 and IVF changed to NS per renal 1/4 and Co2 stable and IV stopped 1/7 - CO2 down to 17 on 1/9 -- ?need po bicarb  -- monitor  Leukocytosis -- WBC up to 13.7 on 1/9 -- ?etiology - afebrile - no resp sx or urinary sx but villalobos removed 1/8 -- cont monitor s/p zosyn from 12/31 - 1/8 -- monitor and consider f/u if fever or cont to worsen  Hyponatremia -- due to JOSE , fluid status per renal as above -- up and down - improved to WNL since 1/5  -- asx, monitor  Hypokalemia -- up and down- replacement per renal with #2 -- monitor and replace prn  Hyperphosphatemia -- due to renal dysfxn - improving to WNL since 1/5 -- cont monitor and mgmt per renal  Hypomagnesemia -- low 1.5 1/8 -- replace per renal -- monitor  Hypocalcemia -- replacement per renal with #2 - monitor  E coli enteropathogenic diarrhea -- 
  Pharmacy Note - Extended Infusion Beta-Lactam Dose Adjustment    Piperacillin/Tazobactam 3375 mg q8h extended infusion ordered for the treatment of Intra-abdominal Infection. Per [facility] Extended Infusion Beta-Lactam Policy, this will be changed to 3375 mg q12h extended infusion     Estimated Creatinine Clearance: Estimated Creatinine Clearance: 11 mL/min (A) (based on SCr of 6.5 mg/dL (HH)).    Dialysis Status, JOSE, CKD: JOSE on CKD    BMI: Body mass index is 22.28 kg/m².    Rationale for Adjustment: Dose adjusted per [facility] Extended Infusion Policy based on renal function and indication. The above medication is renally eliminated and demonstrates time-dependent effects on bacterial eradication. Extended-infusion dosing strategy aims to enhance microbiologic and clinical efficacy.     Pharmacy will monitor renal function daily and adjust dose as necessary.      Please call with any questions.    Thank you,  Richelle Baker, PharmD, BCPS, BCCCP  12/31/2023 7:34 AM      
  Physician Progress Note      PATIENT:               NABIL GUALLPA  [facility] #:                  [id number]  :                       1950  ADMIT DATE:       2023 4:02 AM  DISCH DATE:  RESPONDING  PROVIDER #:        Kiki Beckham MD          QUERY TEXT:    Patient admitted with ruptured appendix. Noted to have moderate malnutrition   per dietician assessment note. If possible, please document in progress notes   and discharge summary if you are evaluating and /or treating any of the   following:    The medical record reflects the following:  Risk Factors: chronic illness  Clinical Indicators: ASPEN criteria met for moderate malnutrition: Energy   Intake:  75% or less estimated energy requirements for 1 month or longer  Weight Loss:  Mild weight loss (specify amount and time period) (-8.8% loss in     1 year per EMR)  Body Fat Loss:   (moderate losses) Orbital, Triceps  Muscle Mass Loss:   (moderate losses) Temples (temporalis)  Treatment: Dietician consult, refuses ONS    ASPEN Criteria:    https://aspenjournals.onlinelibrary.slade.com/doi/full/10.1177/427222506849791  5  Options provided:  -- Moderate malnutrition  -- Protein calorie malnutrition moderate  -- Other - I will add my own diagnosis  -- Disagree - Not applicable / Not valid  -- Disagree - Clinically unable to determine / Unknown  -- Refer to Clinical Documentation Reviewer    PROVIDER RESPONSE TEXT:    This patient has moderate malnutrition.    Query created by: Mague Arriola on 2024 10:55 AM      Electronically signed by:  Kiki Beckham MD 2024 2:49 PM          
 [facility]  INPATIENT PHYSICAL THERAPY  DAILY NOTE  [facility] CCU-STEPDOWN 3B - 3B-32/032-A    Time In: 1138  Time Out: 1203  Timed Code Treatment Minutes: 25 Minutes  Minutes: 25          Date: 2024  Patient Name: Phi Sanz,  Gender:  male        MRN: [id number]  : 1950  (73 y.o.)     Referring Practitioner: Chaitanya Noble MD  Diagnosis: JOSE (acute kidney injury)  Additional Pertinent Hx: Per EMR: Phi Sanz is a 73 y.o. male with a PMH of paroxysmal atrial fibrillation, hyperlipidemia, insulin-dependent diabetes, hypertension, CKD 4 who presented to [facility] with diarrhea and mild abdominal pain. Pt found to have Perforated appendicitis with a large right  lower quadrant abscess. Pt s/p Abdominal exploration, Drainage of pelvic abscess, Ileocecectomy on 23 by Dr Noble.     Prior Level of Function:  Lives With: Spouse  Type of Home: House  Home Layout: One level  Home Access: Stairs to enter with rails  Entrance Stairs - Number of Steps: 6  Home Equipment: Rollator, Wheelchair-manual   Bathroom Shower/Tub: Tub/Shower unit  Bathroom Toilet: Handicap height  Bathroom Equipment: Tub transfer bench    ADL Assistance: Independent  Homemaking Assistance: Needs assistance (gets meals on wheels; pt reported other IADL tasks \"just don't get done\")  Ambulation Assistance: Independent  Transfer Assistance: Independent  Active : Yes  Additional Comments: Pt admits to frequent falls at home (reported ~40 falls per month-question accuracy. Pt reported wife \"doesn't do anything at home, and she falls a lot too.\" Pt reported uses rollator for all mobility.    Restrictions/Precautions:  Restrictions/Precautions: General Precautions, Fall Risk  Position Activity Restriction  Other position/activity restrictions: R foot drop, 1/5 L ankle (no AFO's)     SUBJECTIVE: Pt in bed upon arrival, and agrees to therapy, RN approved session, Pt A&O X     PAIN: 6/10: with 
1936 pt arrived to pacu, unresponsive on arrival. Respirations unlabored on 6L NC, OPA in place. Sites CDI x1, AVELINA drain in place. 150 ml emptied on arrival. VSS. NG in R nare at 68  1946 pt awakens to voice, OPA removed. Easily drifts back to sleep without answering further questions. VSS  1950 pt awake in bed, denies pain then drifts back to sleep. VSS  2006 pt resting, meets criteria for discharge from pacu at this time.  2013 Pt transported to [facility] in stable condition     
[facility]   Dr. Chaitanya Noble MD  Daily Progress Note  Pt Name: Phi Sanz  Medical Record Number: [id number]  Date of Birth 1950   Today's Date: 1/4/2024    POD# 2    CHIEF COMPLAINTStatus post ileocecectomyDrainage of pelvic abscess    SUBJECTIVE  Patient feels Okay    OBJECTIVE  CURRENT VITALS /70   Pulse 79   Temp 98.2 °F (36.8 °C) (Oral)   Resp 16   Ht 1.829 m (6' 0.01\") Comment: 8/8/23  Wt 77.8 kg (171 lb 8.3 oz)   SpO2 95%   BMI 23.26 kg/m²   LUNGS: Lungs clear   ABDOMEN:   WOUNDS: Soft bowel sounds positiveBut diminished dressing was removed abdomen is soft staple line intact AVELINA serosanguineous  24 HR INTAKE/OUTPUT :   Intake/Output Summary (Last 24 hours) at 1/4/2024 1113  Last data filed at 1/4/2024 1044  Gross per 24 hour   Intake 3777.4 ml   Output 1415 ml   Net 2362.4 ml     DRAIN/TUBE OUTPUT : [REMOVED] NG/OG/NJ/NE Tube Right nostril-Output (mL): 0 ml    LABS  CBC :   Lab Results   Component Value Date/Time    WBC 8.9 01/04/2024 03:40 AM    HGB 7.5 01/04/2024 03:40 AM    HCT 23.0 01/04/2024 03:40 AM     01/04/2024 03:40 AM     BMP:   Lab Results   Component Value Date/Time     01/04/2024 03:40 AM    K 3.6 01/04/2024 03:40 AM    K 4.2 01/01/2024 03:42 AM    CL 98 01/04/2024 03:40 AM    CO2 23 01/04/2024 03:40 AM    BUN 67 01/04/2024 03:40 AM    CREATININE 6.3 01/04/2024 03:40 AM    MG 2.3 12/31/2023 03:42 AM    PHOS 5.6 01/04/2024 03:40 AM       ASSESSMENT  1. White blood cell count still normal hemoglobin did drop down to 7.5Creatinine noted at 6.3 but he is making urineWill DC NG but keep n.p.o. yetPatient would like to keep Meraz inPatient obviously has nonoliguric renal failure      PLAN  1. As above      Chaitanya Noble MD  Electronically signed 1/4/2024 at 11:13 AM     
[facility]   Dr. Chaitanya Noble MD  Daily Progress Note  Pt Name: Phi Sanz  Medical Record Number: [id number]  Date of Birth 1950   Today's Date: 1/5/2024    POD# 3    CHIEF COMPLAINT s/p ileocecectomy    SUBJECTIVE  Patient feels well.    OBJECTIVE  CURRENT VITALS BP (!) 141/71   Pulse 78   Temp 98 °F (36.7 °C) (Oral)   Resp 18   Ht 1.829 m (6' 0.01\") Comment: 8/8/23  Wt 77.8 kg (171 lb 8.3 oz)   SpO2 96%   BMI 23.26 kg/m²   LUNGS: Lungs clear   ABDOMEN: soft  WOUNDS: AVELINA sero penning[place]  24 HR INTAKE/OUTPUT :   Intake/Output Summary (Last 24 hours) at 1/5/2024 1005  Last data filed at 1/5/2024 0814  Gross per 24 hour   Intake 2443.12 ml   Output 3160 ml   Net -716.88 ml     DRAIN/TUBE OUTPUT : [REMOVED] NG/OG/NJ/NE Tube Right nostril-Output (mL): 0 ml    LABS  CBC :   Lab Results   Component Value Date/Time    WBC 7.7 01/05/2024 03:35 AM    HGB 7.8 01/05/2024 03:35 AM    HCT 24.4 01/05/2024 03:35 AM     01/05/2024 03:35 AM     BMP:   Lab Results   Component Value Date/Time     01/05/2024 03:35 AM    K 3.4 01/05/2024 03:35 AM    K 4.2 01/01/2024 03:42 AM     01/05/2024 03:35 AM    CO2 23 01/05/2024 03:35 AM    BUN 60 01/05/2024 03:35 AM    CREATININE 5.6 01/05/2024 03:35 AM    MG 2.3 12/31/2023 03:42 AM    PHOS 4.6 01/05/2024 03:35 AM     Clinical Information: PERFORATED APPENDIX     FINAL DIAGNOSIS:   Ileum, appendix and cecum, segmental resection:     Acute appendicitis with transmural inflammation and features of   perforation.    Acute serositis with associated hemorrhagic fibrous adhesions.     Specimen:   COLON, ILEOCECUM       ASSESSMENT  1. Pt stable        PLAN  1. Cl liq      Chaitanya Noble MD  Electronically signed 1/5/2024 at 10:05 AM    
[facility]   Dr. Chaitanya Noble MD  Daily Progress Note  Pt Name: Phi Sanz  Medical Record Number: [id number]  Date of Birth 1950   Today's Date: 12/31/2023    HD#2    CHIEF COMPLAINT perf appendicitis vs Colitis    SUBJECTIVE  Patient feels Persists with right lower quadrant pain    OBJECTIVE  CURRENT VITALS /64   Pulse 80   Temp 98.2 °F (36.8 °C) (Oral)   Resp 18   Wt 74.5 kg (164 lb 3.9 oz)   SpO2 97%   BMI 22.28 kg/m²   LUNGS: Lungs clear   ABDOMEN: All quadrants soft except right lower quadrant persist with pain  WOUNDS: Not applicable  24 HR INTAKE/OUTPUT :   Intake/Output Summary (Last 24 hours) at 12/31/2023 1120  Last data filed at 12/31/2023 0411  Gross per 24 hour   Intake 3525.07 ml   Output --   Net 3525.07 ml     DRAIN/TUBE OUTPUT :      LABS  CBC :   Lab Results   Component Value Date/Time    WBC 9.1 12/31/2023 03:42 AM    HGB 8.5 12/31/2023 03:42 AM    HCT 26.7 12/31/2023 03:42 AM     12/31/2023 03:42 AM     BMP:   Lab Results   Component Value Date/Time     12/31/2023 03:42 AM    K 4.2 12/31/2023 03:42 AM     12/31/2023 03:42 AM    CO2 15 12/31/2023 03:42 AM    BUN 62 12/31/2023 03:42 AM    CREATININE 6.5 12/31/2023 03:42 AM    MG 2.3 12/31/2023 03:42 AM    PHOS 4.0 12/31/2023 03:42 AM     Collected: 12/30/23 1510    Updated: 12/30/23 2043    Specimen Source: Stool     Campylobacter PCR Not Detected    Plesiomonas Shigelloides PCR Not Detected    Salmonella PCR Not Detected    Vibrio PCR Not Detected    Vibrio Cholerae PCR Not Detected    Yersinia Enterocolitica PCR Not Detected    E Coli Enteroaggregative PCR Not Detected    E Coli Enteropathogenic PCR Detected Abnormal     E Coli Enterotoxigenic PCR Not Detected    E Coli Shiga Like Toxin PCR Not Detected    E Coli O157 PCR NA    E Coli Shigella/Enteroinvasive PCR Not Detected    Cryptosporidium PCR Not Detected    Cyclospora Cayetanensis PCR Not Detected    E HISTOLYTICA GI FILM ARRAY 
[facility]   Dr. Chaitanya Noble MD  Daily Progress Note  Pt Name: Phi Sanz  Medical Record Number: [id number]  Date of Birth 1950   Today's Date: 1/2/2024    HD#4    CHIEF COMPLAINT  Ruptured appendicitis versus colitis right colon    SUBJECTIVE  Patient Has no nausea or vomiting tolerating clear liquidPersist with right lower quadrant pain    OBJECTIVE  CURRENT VITALS /72   Pulse 81   Temp 98.1 °F (36.7 °C) (Oral)   Resp 18   Wt 77.8 kg (171 lb 8.3 oz)   SpO2 96%   BMI 23.26 kg/m²   LUNGS: Lungs clear   ABDOMEN: Soft in all quadrants bowel sounds present but tender mass guarding over right lower quadrant  WOUNDS: Not applicable  24 HR INTAKE/OUTPUT :   Intake/Output Summary (Last 24 hours) at 1/2/2024 1105  Last data filed at 1/2/2024 0631  Gross per 24 hour   Intake 5070.3 ml   Output 200 ml   Net 4870.3 ml     DRAIN/TUBE OUTPUT :      LABS  CBC :   Lab Results   Component Value Date/Time    WBC 9.4 01/02/2024 03:44 AM    HGB 8.4 01/02/2024 03:44 AM    HCT 26.0 01/02/2024 03:44 AM     01/02/2024 03:44 AM     BMP:   Lab Results   Component Value Date/Time     01/02/2024 03:44 AM    K 3.9 01/02/2024 03:44 AM    K 4.2 01/01/2024 03:42 AM    CL 96 01/02/2024 03:44 AM    CO2 14 01/02/2024 03:44 AM    BUN 58 01/02/2024 03:44 AM    CREATININE 5.7 01/02/2024 03:44 AM    MG 2.3 12/31/2023 03:42 AM    PHOS 4.3 01/02/2024 03:44 AM     PROCEDURE: CT ABDOMEN PELVIS WO CONTRAST     CLINICAL INFORMATION: re ck perf appy vs colitis .     COMPARISON: None.     TECHNIQUE: Axial 5 mm CT images were obtained through the abdomen and pelvis. No contrast was given. Sagittal and coronal reconstructions were also provided for interpretation.     All CT scans at this facility use dose modulation, iterative reconstruction, and/or weight-based dosing when appropriate to reduce radiation dose to as low as reasonably achievable.     FINDINGS:   There is a small left and trace right pleural 
CLINICAL PHARMACY: DISCHARGE MED RECONCILIATION/REVIEW    [facility] Patient?: No  Total # of Interventions Recommended: 0  Total # Interventions Accepted: 0  Intervention Severity:   - Level 1 Intervention Present?: No   - Level 2 #: 0   - Level 3 #: 0   Time Spent (min): 15    Additional Documentation:   
[facility]  STRZ MED SURG 8AB  Occupational Therapy  Daily Note  Time:   Time In: 1130  Time Out: 1153  Timed Code Treatment Minutes: 23 Minutes  Minutes: 23          Date: 1/10/2024  Patient Name: Phi Sanz,   Gender: male      Room: -12/012-A  MRN: [id number]  : 1950  (73 y.o.)  Referring Practitioner: Chaitanya Noble MD  Diagnosis: JOSE  Additional Pertinent Hx: Per EMR: Phi Sanz is a 73 y.o. male with a PMH of paroxysmal atrial fibrillation, hyperlipidemia, insulin-dependent diabetes, hypertension, CKD 4 who presented to [facility] with diarrhea and mild abdominal pain.  States he has had waxing and waning diarrhea for at least 4 years and alternates between laxatives and Imodium.  Was too weak to walk and wife brought him to the emergency department. Pt found to have Perforated appendicitis with a large right  lower quadrant abscess. Pt s/p Abdominal exploration, Drainage of pelvic abscess, Ileocecectomy  on 23.    Restrictions/Precautions:  Restrictions/Precautions: General Precautions, Fall Risk  Position Activity Restriction  Other position/activity restrictions: R foot drop, 1/5 L ankle (no AFO's)     SUBJECTIVE: Pt lying on side in bedside chair reclined. Pt pleasant and agreeable to OT session.     PAIN: c/o pain on bottom     Vitals: Vitals not assessed per clinical judgement, see nursing flowsheet    COGNITION: Slow Processing, Decreased Problem Solving, and Decreased Safety Awareness    ADL:   Toileting: Dependent.  Pt demo'ed very little incontinence and required total A for clean up. Pt is able to roll side to side .    BALANCE:  Sitting Balance:  Stand By Assistance. Seated on EOB  Standing Balance: Contact Guard Assistance.      BED MOBILITY:  Rolling to Left: Stand By Assistance, with head of bed flat, with rail, with increased time for completion    Rolling to Right: Stand By Assistance, with head of bed flat, with rail, with increased 
Comprehensive Nutrition Assessment    Type and Reason for Visit:  Reassess    Nutrition Recommendations/Plan:   Continue diet as ordered and encourage PO intake.   Pt denies all ONS.   Monitoring all nutrition aspects and will provide further recommendations as necessary and appropriate.      Malnutrition Assessment:  Malnutrition Status:  Moderate malnutrition (01/02/24 1557)    Context:  Chronic Illness     Findings of the 6 clinical characteristics of malnutrition:  Energy Intake:  75% or less estimated energy requirements for 1 month or longer  Weight Loss:  Mild weight loss (specify amount and time period) (-8.8% loss in ~1 year per EMR)     Body Fat Loss:   (moderate losses) Orbital, Triceps   Muscle Mass Loss:   (moderate losses) Temples (temporalis)  Fluid Accumulation:  No significant fluid accumulation     Strength:  Not Performed    Nutrition Assessment:      At risk for further nutrition compromise r/t concern for ruptured appendicitis with RLQ abscess s/p ileocecectomy~ 1/2/24, JOSE on CKD IV, increased nutrient needs for wound healing, moderately malnourished and underlying medical condition (PMHx: Afib, prostate CA - 6/2023, CVA, DM, HLD).      Nutrition Related Findings:   Pt. Report/Treatments/Miscellaneous: pt seen at bedside this afternoon. Reports feeling better overall. Denies need for ONS and denies need for diet education. Encouraged adherence to DM recommendations.   GI Status: last BM 1/14~ pt reports they are soft   Pertinent Labs: Na 138, K 3.6, BUN 29, creatinine 3, glucose 166, A1C 6.2 (12/21/23)  Pertinent Meds: lipitor, protonix, fibercon      Wound Type: Stage II (coccyx)       Current Nutrition Intake & Therapies:    Average Meal Intake: 51-75% (pt reports fair appetite and intake)  Average Supplements Intake: None Ordered (refuses all ONS)  ADULT DIET; Regular; 5 carb choices (75 gm/meal); No red dye    Anthropometric Measures:  Height: 182.9 cm (6' 0.01\") (8/8/23)  Ideal Body 
[facility]  STRZ MED SURG 8AB  Occupational Therapy  Daily Note  Time:   Time In: 1000  Time Out: 1023  Timed Code Treatment Minutes: 23 Minutes  Minutes: 23          Date: 2024  Patient Name: Phi Sanz,   Gender: male      Room: [facility]12/012-A  MRN: [id number]  : 1950  (73 y.o.)  Referring Practitioner: Chaitanya Noble MD  Diagnosis: JOSE  Additional Pertinent Hx: Per EMR: Phi Sanz is a 73 y.o. male with a PMH of paroxysmal atrial fibrillation, hyperlipidemia, insulin-dependent diabetes, hypertension, CKD 4 who presented to [facility] with diarrhea and mild abdominal pain.  States he has had waxing and waning diarrhea for at least 4 years and alternates between laxatives and Imodium.  Was too weak to walk and wife brought him to the emergency department. Pt found to have Perforated appendicitis with a large right  lower quadrant abscess. Pt s/p Abdominal exploration, Drainage of pelvic abscess, Ileocecectomy  on 23.    Restrictions/Precautions:  Restrictions/Precautions: Isolation (C-Diff)  Position Activity Restriction  Other position/activity restrictions: R foot drop, 1/5 L ankle (no AFO's), buttock wound     SUBJECTIVE: Nurse Natali prajapati'medina session, In bed upon arrival, encouragement given for session    PAIN:  Did not rate number, Generalized    Vitals: Vitals not assessed per clinical judgement, see nursing flowsheet    COGNITION: Decreased Insight    ADL:   Incontinence x2 of bowel during session, dep for clean up .    BALANCE:  Standing Balance: Minimal Assistance. Approx 1 minute with B UE support    BED MOBILITY:  Sit to Supine: Minimal Assistance HOB slightly elevated, used bedrail  Scooting: Minimal Assistance    Rolling with Min A, HOB flat, used bedrail  TRANSFERS:  Sit to Stand:  Moderate Assistance. EOB  Stand to Sit: Minimal Assistance. EOB       Modified Nemo Scale:  Not Applicable    ASSESSMENT:     Activity Tolerance:  Patient tolerance of  
Gastroenterology Progress Note:     Patient Name:  Phi Sanz   MRN: [id number]  [id number]  YOB: 1950  Admit Date: 12/30/2023  4:02 AM  Primary Care Physician: Kaushal Ramsay MD   8A-12/012-A     Patient seen and examined.  24 hours events and chart reviewed.    Subjective: Patient resting in bed. He has no complaints. Nursing documenting brown stools. Hgb 7.9    Objective:  BP (!) 137/53   Pulse 72   Temp 97.8 °F (36.6 °C) (Oral)   Resp 16   Ht 1.829 m (6' 0.01\") Comment: 8/8/23  Wt 77.3 kg (170 lb 6.7 oz)   SpO2 93%   BMI 23.11 kg/m²     Physical Exam:    General:  Chronically ill appearing male  HEENT: Atraumatic, normocephalic. Moist oral mucous membranes.  Neck: Supple without adenopathy, JVD, thyromegaly or masses. Trachea midline.  CV: Heart RRR, no murmurs, rubs, gallops.  Resp: Even, easy without cough or accessory use. Lungs clear to ascultation bilaterally.   Abd: Round, soft, mildly tender near surgical site with palpation. No hepatosplenomegaly or mass present. Active bowel sounds heard. No distention noted.   Ext:  Without cyanosis, clubbing, edema.   Skin: Pale, warm, dry. Surgical sites to the abdomen with steri-strips C/D/I  Neuro:  Alert, oriented x 3 with no obvious deficits.       Rectal: deferred    Labs:   CBC:   Lab Results   Component Value Date/Time    WBC 7.0 01/15/2024 05:44 AM    HGB 7.9 01/15/2024 08:46 AM    HCT 25.1 01/15/2024 08:46 AM    MCV 97.2 01/15/2024 05:44 AM     01/15/2024 05:44 AM     BMP:   Lab Results   Component Value Date/Time     01/15/2024 05:44 AM    K 3.6 01/15/2024 05:44 AM    K 4.2 01/01/2024 03:42 AM     01/15/2024 05:44 AM    CO2 22 01/15/2024 05:44 AM    PHOS 3.6 01/15/2024 05:44 AM    BUN 29 01/15/2024 05:44 AM    CREATININE 3.0 01/15/2024 05:44 AM    CALCIUM 8.0 01/15/2024 05:44 AM     PT/INR:   Lab Results   Component Value Date/Time    INR 1.23 12/30/2023 03:34 PM     Lipids:   Lab Results   Component Value 
Gastroenterology Progress Note:     Patient Name:  Phi Sanz   MRN: [id number]  [id number]  YOB: 1950  Admit Date: 12/30/2023  4:02 AM  Primary Care Physician: Kaushal Ramsay MD   8A-12/012-A     Patient seen and examined.  24 hours events and chart reviewed.    Subjective: Patient resting in bed. He has some surgical discomfort. Denies n/v. Per RN, he had a large soft stool that was dark. Patient is hoping to be discharged. Hgb 8.4 KUB results reviewed.    Objective:  /67   Pulse 89   Temp 97.7 °F (36.5 °C) (Oral)   Resp 18   Ht 1.829 m (6' 0.01\") Comment: 8/8/23  Wt 86.4 kg (190 lb 7.6 oz)   SpO2 97%   BMI 25.83 kg/m²     Physical Exam:    General:  chronically ill appearing male  HEENT: Atraumatic, normocephalic. Moist oral mucous membranes.  Neck: Supple without adenopathy, JVD, thyromegaly or masses. Trachea midline.  CV: Heart RRR, no murmurs, rubs, gallops.  Resp: Even, easy without cough or accessory use. Lungs clear to ascultation bilaterally.   Abd: Round, soft, mildly tender near surgical site. No hepatosplenomegaly or mass present. Active bowel sounds heard. No distention noted.   Ext:  Without cyanosis, clubbing, edema.   Skin: Pale, warm, dry. Surgical site with steri-strips C/D/I   Neuro:  Alert, oriented x 3 with no obvious deficits.       Rectal: deferred    Labs:   CBC:   Lab Results   Component Value Date/Time    WBC 8.3 01/13/2024 09:08 AM    HGB 8.4 01/13/2024 09:08 AM    HCT 25.0 01/13/2024 09:08 AM    MCV 97.3 01/13/2024 09:08 AM     01/13/2024 09:08 AM     BMP:   Lab Results   Component Value Date/Time     01/13/2024 09:08 AM    K 4.3 01/13/2024 09:08 AM    K 4.2 01/01/2024 03:42 AM     01/13/2024 09:08 AM    CO2 19 01/13/2024 09:08 AM    PHOS 3.3 01/13/2024 09:08 AM    BUN 30 01/13/2024 09:08 AM    CREATININE 3.2 01/13/2024 09:08 AM    CALCIUM 7.9 01/13/2024 09:08 AM     PT/INR:   Lab Results   Component Value Date/Time    INR 1.23 
[facility]   Dr. Chaitanya Noble MD  Daily Progress Note  Pt Name: Phi Sanz  Medical Record Number: [id number]  Date of Birth 1950   Today's Date: 1/3/2024    POD# 1    CHIEF COMPLAINT perf appy    SUBJECTIVE  Patient pain better pulled NG out replaced    OBJECTIVE  CURRENT VITALS BP (!) 140/67   Pulse 86   Temp 97.9 °F (36.6 °C) (Oral)   Resp 16   Ht 1.829 m (6' 0.01\") Comment: 8/8/23  Wt 77.8 kg (171 lb 8.3 oz)   SpO2 98%   BMI 23.26 kg/m²   LUNGS: Lungs clear   ABDOMEN: soft RLQ pain gone  WOUNDS: dressing dry  24 HR INTAKE/OUTPUT :   Intake/Output Summary (Last 24 hours) at 1/3/2024 0911  Last data filed at 1/3/2024 0320  Gross per 24 hour   Intake 3348.5 ml   Output 1735 ml   Net 1613.5 ml     DRAIN/TUBE OUTPUT :      LABS  CBC :   Lab Results   Component Value Date/Time    WBC 9.7 01/03/2024 06:50 AM    HGB 8.8 01/03/2024 06:50 AM    HCT 28.5 01/03/2024 06:50 AM     01/03/2024 06:50 AM     BMP:   Lab Results   Component Value Date/Time     01/03/2024 06:50 AM    K 5.1 01/03/2024 06:50 AM    K 4.2 01/01/2024 03:42 AM    CL 93 01/03/2024 06:50 AM    CO2 11 01/03/2024 06:50 AM    BUN 66 01/03/2024 06:50 AM    CREATININE 6.4 01/03/2024 06:50 AM    MG 2.3 12/31/2023 03:42 AM    PHOS 7.2 01/03/2024 06:50 AM       ASSESSMENT  1. Pt stable RLQ pain gone amazing WBC still nl      PLAN  1. Cont NG   cont Zoysyn      Chaitanya Noble MD  Electronically signed 1/3/2024 at 9:11 AM    
Hospital Follow Up Note  AUGUST   S  no bleeding     Current Facility-Administered Medications   Medication Dose Route Frequency Provider Last Rate Last Admin    ipratropium 0.5 mg-albuterol 2.5 mg (DUONEB) nebulizer solution 1 Dose  1 Dose Inhalation BID RT Melisa Rogel APRN - CNP        pantoprazole (PROTONIX) tablet 40 mg  40 mg Oral QAM AC Carine Mcclure APRN - CNP   40 mg at 01/14/24 0527    polycarbophil (FIBERCON) tablet 625 mg  625 mg Oral Daily Carine Mcclure APRN - CNP   625 mg at 01/14/24 1013    epoetin marbella-epbx (RETACRIT) injection 10,000 Units  10,000 Units SubCUTAneous Weekly HemNatalia waggoner, DO   10,000 Units at 01/11/24 1656    magnesium oxide (MAG-OX) tablet 400 mg  400 mg Oral BID Natalia Richardson, DO   400 mg at 01/14/24 1013    loperamide (IMODIUM) capsule 2 mg  2 mg Oral 4x Daily PRN Melisa Rogel APRN - CNP        sodium bicarbonate tablet 1,300 mg  1,300 mg Oral BID HemmelNatalia sharif E, DO   1,300 mg at 01/14/24 1013    amLODIPine (NORVASC) tablet 5 mg  5 mg Oral Daily HemNatalia waggoner, DO   5 mg at 01/14/24 1013    [Held by provider] apixaban (ELIQUIS) tablet 5 mg  5 mg Oral BID Kiki Beckham MD   5 mg at 01/13/24 2046    insulin glargine (LANTUS) injection vial 20 Units  20 Units SubCUTAneous Nightly Kiki Beckham MD   20 Units at 01/13/24 2353    oxyCODONE-acetaminophen (PERCOCET) 5-325 MG per tablet 1 tablet  1 tablet Oral Q4H PRN Chaitanya Noble MD   1 tablet at 01/07/24 2021    insulin lispro (HUMALOG) injection vial 0-8 Units  0-8 Units SubCUTAneous TID WC Kiki Beckham MD   2 Units at 01/09/24 1740    insulin lispro (HUMALOG) injection vial 0-4 Units  0-4 Units SubCUTAneous Nightly Kiki Beckham MD        HYDROmorphone (DILAUDID) injection 0.5 mg  0.5 mg IntraVENous Q2H PRN Chaitanya Noble MD   0.5 mg at 01/05/24 0812    glucose chewable tablet 16 g  4 tablet Oral PRN Chaitanya Noble MD        dextrose bolus 10% 125 mL  125 mL IntraVENous PRN Chaitanya Noble 
[facility]   Nephrology progress note  1/1/2024, 11:46 AM      Pt Name:    Phi Sanz  MRN:     [id number]     YOB: 1950  Admit Date:    12/30/2023  4:02 AM    Chief Complaint: Nephrology following for JOSE/CKD.    Subjective:  Patient was seen this morning.   Diarrhea is better. Still with abd pain.    Objective:  24HR INTAKE/OUTPUT:    Intake/Output Summary (Last 24 hours) at 1/1/2024 1146  Last data filed at 12/31/2023 1534  Gross per 24 hour   Intake 1806.9 ml   Output --   Net 1806.9 ml         I/O last 3 completed shifts:  In: 4568.4 [P.O.:900; I.V.:3512.1; IV Piggyback:156.3]  Out: -   No intake/output data recorded.   Admission weight: 71.5 kg (157 lb 10.1 oz)  Wt Readings from Last 3 Encounters:   01/01/24 76.1 kg (167 lb 12.3 oz)   09/26/23 77.1 kg (170 lb)   08/08/23 75.8 kg (167 lb)        Vitals :   Vitals:    12/31/23 1945 12/31/23 2343 01/01/24 0400 01/01/24 0815   BP: 132/73 120/71 131/65 (!) 148/69   Pulse: 90 85 78 80   Resp: 18 18 20 18   Temp: 98.8 °F (37.1 °C) 98.8 °F (37.1 °C) 98.2 °F (36.8 °C) 97.8 °F (36.6 °C)   TempSrc: Oral Oral  Oral   SpO2: 95% 98% 97% 97%   Weight:   76.1 kg (167 lb 12.3 oz)        Physical examination  General Appearance: alert and cooperative with exam, appears comfortable, no distress  Mouth/Throat: Oral mucosa moist  Neck: No JVD  Lungs: Air entry B/L, no rales, no use of accessory muscles  Heart:  S1, S2 heard  GI: soft, RLQ tenderness  Extremities: no significant edema    Medications:  Infusion:    sodium bicarbonate 75 mEq in sodium chloride 0.45 % 1,000 mL infusion 125 mL/hr at 01/01/24 0319    dextrose      sodium chloride      heparin (PORCINE) Infusion 21 Units/kg/hr (12/31/23 2116)     Meds:    piperacillin-tazobactam  3,375 mg IntraVENous Q12H    insulin lispro  0-4 Units SubCUTAneous TID WC    insulin lispro  0-4 Units SubCUTAneous Nightly    sodium chloride flush  5-40 mL IntraVENous 2 times per day    amiodarone  100 
[facility]   Nephrology progress note  1/10/2024, 9:58 AM      Pt Name:    Phi Sanz  MRN:     [id number]     YOB: 1950  Admit Date:    12/30/2023  4:02 AM    Chief Complaint: Nephrology following for acute kidney injury.    Subjective:  Patient seen and examined this morning   Feeling ok voiding ok.  DC planning in process.     Objective:  24HR INTAKE/OUTPUT:    Intake/Output Summary (Last 24 hours) at 1/10/2024 0958  Last data filed at 1/10/2024 0305  Gross per 24 hour   Intake 840 ml   Output 151 ml   Net 689 ml      Admission weight: 71.5 kg (157 lb 10.1 oz)  Wt Readings from Last 3 Encounters:   01/09/24 86.4 kg (190 lb 7.6 oz)   09/26/23 77.1 kg (170 lb)   08/08/23 75.8 kg (167 lb)        Vitals :   Vitals:    01/09/24 1541 01/09/24 1945 01/09/24 2340 01/10/24 0750   BP: (!) 147/67 136/62 132/67 (!) 138/56   Pulse: 79 85 80 85   Resp: 17 18 18 17   Temp: 98.9 °F (37.2 °C) 98.5 °F (36.9 °C) 98.2 °F (36.8 °C) 99.1 °F (37.3 °C)   TempSrc: Oral Oral Oral Oral   SpO2: 90% 94% 94% 90%   Weight:       Height:           Physical examination  General Appearance:  Well developed. No distress  Mouth/Throat:  Oral mucosa moist  Neck:  Supple, no JVD  Lungs:  Breath sounds: clear  Heart::  S1,S2 heard  Abdomen:  Soft, non - tender  Musculoskeletal:  Edema -not significant    Medications:  Infusion:    dextrose      sodium chloride       Meds:    sodium bicarbonate  1,300 mg Oral BID    amLODIPine  5 mg Oral Daily    apixaban  5 mg Oral BID    insulin glargine  20 Units SubCUTAneous Nightly    insulin lispro  0-8 Units SubCUTAneous TID     insulin lispro  0-4 Units SubCUTAneous Nightly    sodium chloride flush  5-40 mL IntraVENous 2 times per day    amiodarone  100 mg Oral Daily    busPIRone  10 mg Oral BID    gabapentin  300 mg Oral Nightly    levothyroxine  50 mcg Oral Daily    atorvastatin  10 mg Oral Nightly    QUEtiapine  100 mg Oral BID    tamsulosin  0.4 mg Oral Daily 
[facility]   Nephrology progress note  1/11/2024, 10:47 AM      Pt Name:    Phi Sanz  MRN:     [id number]     YOB: 1950  Admit Date:    12/30/2023  4:02 AM    Chief Complaint: Nephrology following for acute kidney injury.    Subjective:  Patient seen and examined this morning   Hgb dropped this AM denies bloody stools, no significant abd pain noted.     Objective:  24HR INTAKE/OUTPUT:    Intake/Output Summary (Last 24 hours) at 1/11/2024 1047  Last data filed at 1/11/2024 0545  Gross per 24 hour   Intake 910 ml   Output --   Net 910 ml      Admission weight: 71.5 kg (157 lb 10.1 oz)  Wt Readings from Last 3 Encounters:   01/09/24 86.4 kg (190 lb 7.6 oz)   09/26/23 77.1 kg (170 lb)   08/08/23 75.8 kg (167 lb)        Vitals :   Vitals:    01/10/24 2005 01/11/24 0353 01/11/24 0806 01/11/24 1029   BP: (!) 151/59 (!) 114/57 (!) 117/58 (!) 130/59   Pulse: 82 80 78 80   Resp: 19 17 18 18   Temp: 99 °F (37.2 °C) 99.2 °F (37.3 °C) 98.4 °F (36.9 °C) 98 °F (36.7 °C)   TempSrc: Oral Oral Oral Oral   SpO2: 90% 94% 93% 92%   Weight:       Height:           Physical examination  General Appearance:  Well developed. No distress  Mouth/Throat:  Oral mucosa moist  Neck:  Supple, no JVD  Lungs:  Breath sounds: clear  Heart::  S1,S2 heard  Abdomen:  Soft, non - tender  Musculoskeletal:  Edema -not significant    Medications:  Infusion:    sodium chloride      dextrose      sodium chloride       Meds:    epoetin marbella-epbx  10,000 Units SubCUTAneous Weekly    sodium bicarbonate  1,300 mg Oral BID    amLODIPine  5 mg Oral Daily    [Held by provider] apixaban  5 mg Oral BID    insulin glargine  20 Units SubCUTAneous Nightly    insulin lispro  0-8 Units SubCUTAneous TID WC    insulin lispro  0-4 Units SubCUTAneous Nightly    sodium chloride flush  5-40 mL IntraVENous 2 times per day    amiodarone  100 mg Oral Daily    busPIRone  10 mg Oral BID    gabapentin  300 mg Oral Nightly    levothyroxine  50 
[facility]   Nephrology progress note  1/12/2024, 11:48 AM      Pt Name:    Phi Sanz  MRN:     [id number]     YOB: 1950  Admit Date:    12/30/2023  4:02 AM    Chief Complaint: Nephrology following for acute kidney injury.    Subjective:  Patient seen and examined this morning   Night RN reported 2 bloody Bms overnight. Patient was unaware.   Has cough this AM.  CXR ordered.    Objective:  24HR INTAKE/OUTPUT:    Intake/Output Summary (Last 24 hours) at 1/12/2024 1148  Last data filed at 1/12/2024 1031  Gross per 24 hour   Intake 942.5 ml   Output 0 ml   Net 942.5 ml      Admission weight: 71.5 kg (157 lb 10.1 oz)  Wt Readings from Last 3 Encounters:   01/09/24 86.4 kg (190 lb 7.6 oz)   09/26/23 77.1 kg (170 lb)   08/08/23 75.8 kg (167 lb)        Vitals :   Vitals:    01/11/24 1534 01/11/24 2106 01/12/24 0400 01/12/24 0800   BP: (!) 152/69 (!) 152/78 (!) 127/53 127/65   Pulse: 76 86 79 77   Resp: 18 19 18 18   Temp: 98.5 °F (36.9 °C) 98.5 °F (36.9 °C) 99 °F (37.2 °C) 98.8 °F (37.1 °C)   TempSrc: Oral Oral Oral Oral   SpO2: 93% 94% 94% 94%   Weight:       Height:           Physical examination  General Appearance:  Well developed. No distress  Mouth/Throat:  Oral mucosa moist  Neck:  Supple, no JVD  Lungs:  diminished  Heart::  S1,S2 heard  Abdomen:  Soft, mild tenderness  Musculoskeletal:  Edema -not significant    Medications:  Infusion:    sodium chloride      dextrose      sodium chloride       Meds:    ipratropium 0.5 mg-albuterol 2.5 mg  1 Dose Inhalation Q4H WA RT    epoetin marbella-epbx  10,000 Units SubCUTAneous Weekly    magnesium oxide  400 mg Oral BID    sodium bicarbonate  1,300 mg Oral BID    amLODIPine  5 mg Oral Daily    [Held by provider] apixaban  5 mg Oral BID    insulin glargine  20 Units SubCUTAneous Nightly    insulin lispro  0-8 Units SubCUTAneous TID WC    insulin lispro  0-4 Units SubCUTAneous Nightly    sodium chloride flush  5-40 mL IntraVENous 2 times 
[facility]   Nephrology progress note  1/13/2024, 2:34 PM      Pt Name:    Phi Sanz  MRN:     [id number]     YOB: 1950  Admit Date:    12/30/2023  4:02 AM    Chief Complaint: Nephrology following for acute kidney injury.    Subjective:  Patient seen and examined this morning   Feels ok today.    Objective:  24HR INTAKE/OUTPUT:    Intake/Output Summary (Last 24 hours) at 1/13/2024 1434  Last data filed at 1/13/2024 1107  Gross per 24 hour   Intake 400 ml   Output --   Net 400 ml      Admission weight: 71.5 kg (157 lb 10.1 oz)  Wt Readings from Last 3 Encounters:   01/09/24 86.4 kg (190 lb 7.6 oz)   09/26/23 77.1 kg (170 lb)   08/08/23 75.8 kg (167 lb)        Vitals :   Vitals:    01/12/24 2330 01/13/24 0645 01/13/24 0730 01/13/24 1330   BP: 124/61 (!) 135/42 111/67 (!) 143/71   Pulse: 78 76 89 74   Resp: 16 18 18 18   Temp: 99.2 °F (37.3 °C) 99.2 °F (37.3 °C) 97.7 °F (36.5 °C) 98 °F (36.7 °C)   TempSrc: Oral Oral Oral Oral   SpO2: 95% 93% 97% 98%   Weight:       Height:           Physical examination  General Appearance:  Well developed. No distress  Mouth/Throat:  Oral mucosa moist  Neck:  Supple, no JVD  Lungs:  diminished  Heart::  S1,S2 heard  Abdomen:  Soft, mild tenderness  Musculoskeletal:  Edema -not significant    Medications:  Infusion:    dextrose      sodium chloride       Meds:    ipratropium 0.5 mg-albuterol 2.5 mg  1 Dose Inhalation BID RT    pantoprazole  40 mg Oral QAM AC    polycarbophil  625 mg Oral Daily    epoetin marbella-epbx  10,000 Units SubCUTAneous Weekly    magnesium oxide  400 mg Oral BID    sodium bicarbonate  1,300 mg Oral BID    amLODIPine  5 mg Oral Daily    apixaban  5 mg Oral BID    insulin glargine  20 Units SubCUTAneous Nightly    insulin lispro  0-8 Units SubCUTAneous TID WC    insulin lispro  0-4 Units SubCUTAneous Nightly    sodium chloride flush  5-40 mL IntraVENous 2 times per day    amiodarone  100 mg Oral Daily    busPIRone  10 mg Oral 
[facility]   Nephrology progress note  1/14/2024, 12:57 PM      Pt Name:    Phi Sanz  MRN:     [id number]     YOB: 1950  Admit Date:    12/30/2023  4:02 AM    Chief Complaint: Nephrology following for acute kidney injury.    Subjective:  Patient seen and examined this morning   States he's been having bloody stools.    Objective:  24HR INTAKE/OUTPUT:    Intake/Output Summary (Last 24 hours) at 1/14/2024 1257  Last data filed at 1/14/2024 1047  Gross per 24 hour   Intake 970 ml   Output --   Net 970 ml      Admission weight: 71.5 kg (157 lb 10.1 oz)  Wt Readings from Last 3 Encounters:   01/14/24 77.3 kg (170 lb 6.7 oz)   09/26/23 77.1 kg (170 lb)   08/08/23 75.8 kg (167 lb)        Vitals :   Vitals:    01/14/24 0515 01/14/24 0530 01/14/24 1000 01/14/24 1148   BP: 132/63  (!) 144/66 (!) 147/70   Pulse: 73  73 73   Resp: 16  16 16   Temp: 98.1 °F (36.7 °C)  98.2 °F (36.8 °C) 97.9 °F (36.6 °C)   TempSrc: Oral  Oral Oral   SpO2: 95%  92% 94%   Weight:  77.3 kg (170 lb 6.7 oz)     Height:           Physical examination  General Appearance:  Well developed. No distress  Mouth/Throat:  Oral mucosa moist  Neck:  Supple, no JVD  Lungs:  diminished  Heart::  S1,S2 heard  Abdomen:  Soft, mild tenderness  Musculoskeletal:  Edema -not significant    Medications:  Infusion:    dextrose      sodium chloride       Meds:    ipratropium 0.5 mg-albuterol 2.5 mg  1 Dose Inhalation BID RT    pantoprazole  40 mg Oral QAM AC    polycarbophil  625 mg Oral Daily    epoetin marbella-epbx  10,000 Units SubCUTAneous Weekly    magnesium oxide  400 mg Oral BID    sodium bicarbonate  1,300 mg Oral BID    amLODIPine  5 mg Oral Daily    [Held by provider] apixaban  5 mg Oral BID    insulin glargine  20 Units SubCUTAneous Nightly    insulin lispro  0-8 Units SubCUTAneous TID WC    insulin lispro  0-4 Units SubCUTAneous Nightly    sodium chloride flush  5-40 mL IntraVENous 2 times per day    amiodarone  100 mg 
[facility]   Nephrology progress note  1/15/2024, 8:17 AM      Pt Name:    Phi Sanz  MRN:     [id number]     YOB: 1950  Admit Date:    12/30/2023  4:02 AM    Chief Complaint: Nephrology following for acute kidney injury.    Subjective:  Patient seen and examined this morning   Feels ok, hemoglobin improved.     Objective:  24HR INTAKE/OUTPUT:    Intake/Output Summary (Last 24 hours) at 1/15/2024 0817  Last data filed at 1/15/2024 0642  Gross per 24 hour   Intake 640 ml   Output --   Net 640 ml      Admission weight: 71.5 kg (157 lb 10.1 oz)  Wt Readings from Last 3 Encounters:   01/14/24 77.3 kg (170 lb 6.7 oz)   09/26/23 77.1 kg (170 lb)   08/08/23 75.8 kg (167 lb)        Vitals :   Vitals:    01/14/24 1557 01/14/24 2015 01/15/24 0436 01/15/24 0749   BP: (!) 146/67 (!) 158/65 139/64 132/63   Pulse: 76 75 71 66   Resp: 16 16 16 16   Temp: 98.5 °F (36.9 °C) 98.1 °F (36.7 °C) 98.2 °F (36.8 °C) 97.9 °F (36.6 °C)   TempSrc: Oral Oral Oral Oral   SpO2: 96% 97% 94% 96%   Weight:       Height:           Physical examination  General Appearance:  Well developed. No distress  Mouth/Throat:  Oral mucosa moist  Neck:  Supple, no JVD  Lungs:  diminished  Heart::  S1,S2 heard  Abdomen:  Soft, mild tenderness  Musculoskeletal:  Edema -not significant    Medications:  Infusion:    dextrose      sodium chloride       Meds:    ipratropium 0.5 mg-albuterol 2.5 mg  1 Dose Inhalation BID RT    pantoprazole  40 mg Oral QAM AC    polycarbophil  625 mg Oral Daily    epoetin marbella-epbx  10,000 Units SubCUTAneous Weekly    magnesium oxide  400 mg Oral BID    sodium bicarbonate  1,300 mg Oral BID    amLODIPine  5 mg Oral Daily    [Held by provider] apixaban  5 mg Oral BID    insulin glargine  20 Units SubCUTAneous Nightly    insulin lispro  0-8 Units SubCUTAneous TID WC    insulin lispro  0-4 Units SubCUTAneous Nightly    sodium chloride flush  5-40 mL IntraVENous 2 times per day    amiodarone  100 mg 
[facility]   Nephrology progress note  1/2/2024, 10:34 AM      Pt Name:    Phi Sanz  MRN:     [id number]     YOB: 1950  Admit Date:    12/30/2023  4:02 AM    Chief Complaint: Nephrology following for JOSE/CKD.    Subjective:  Patient was seen this morning.   Still with abdominal pain, surgery planning to take to OR.   He otherwise denies complaints.    Objective:  24HR INTAKE/OUTPUT:    Intake/Output Summary (Last 24 hours) at 1/2/2024 1034  Last data filed at 1/2/2024 0631  Gross per 24 hour   Intake 5070.3 ml   Output 200 ml   Net 4870.3 ml         I/O last 3 completed shifts:  In: 5070.3 [I.V.:4876.2; IV Piggyback:194.1]  Out: 200 [Urine:200]  No intake/output data recorded.   Admission weight: 71.5 kg (157 lb 10.1 oz)  Wt Readings from Last 3 Encounters:   01/02/24 77.8 kg (171 lb 8.3 oz)   09/26/23 77.1 kg (170 lb)   08/08/23 75.8 kg (167 lb)        Vitals :   Vitals:    01/01/24 2030 01/01/24 2304 01/02/24 0346 01/02/24 0531   BP: 139/61 (!) 123/56 128/72    Pulse: 79 81 81    Resp: 20 18 18    Temp: 97.8 °F (36.6 °C) 98.6 °F (37 °C) 98.1 °F (36.7 °C)    TempSrc: Oral Oral Oral    SpO2: 99% 97% 96%    Weight:    77.8 kg (171 lb 8.3 oz)       Physical examination  General Appearance: alert and cooperative with exam, appears comfortable, no distress  Mouth/Throat: Oral mucosa moist  Neck: No JVD  Lungs: Air entry B/L, no rales, no use of accessory muscles  Heart:  S1, S2 heard  GI: soft, RLQ tenderness  Extremities: no significant edema    Medications:  Infusion:    sodium bicarbonate 75 mEq in sodium chloride 0.45 % 1,000 mL infusion 100 mL/hr at 01/02/24 0631    dextrose      sodium chloride       Meds:    insulin glargine  0.15 Units/kg SubCUTAneous Nightly    insulin lispro  0.05 Units/kg SubCUTAneous TID WC    insulin lispro  0-8 Units SubCUTAneous TID WC    insulin lispro  0-4 Units SubCUTAneous Nightly    piperacillin-tazobactam  3,375 mg IntraVENous Q12H    sodium 
[facility]   Nephrology progress note  1/4/2024, 9:11 AM      Pt Name:    Phi Sanz  MRN:     [id number]     YOB: 1950  Admit Date:    12/30/2023  4:02 AM    Chief Complaint: Nephrology following for JOSE    Subjective:  Patient was seen and examined this morning  No chest pain or shortness of breath  s/p day 2 ileocecectomy for perforated appendicitis with abscess     Objective:  24HR INTAKE/OUTPUT:    Intake/Output Summary (Last 24 hours) at 1/4/2024 0911  Last data filed at 1/4/2024 0813  Gross per 24 hour   Intake 3777.4 ml   Output 690 ml   Net 3087.4 ml         I/O last 3 completed shifts:  In: 4148.2 [I.V.:4071.7; IV Piggyback:76.5]  Out: 1275 [Urine:475; Emesis/NG output:125; Drains:575; Blood:100]  I/O this shift:  In: 2517.7 [I.V.:2456.3; IV Piggyback:61.4]  Out: 60 [Drains:60]   Admission weight: 71.5 kg (157 lb 10.1 oz)  Wt Readings from Last 3 Encounters:   01/02/24 77.8 kg (171 lb 8.3 oz)   09/26/23 77.1 kg (170 lb)   08/08/23 75.8 kg (167 lb)        Vitals :   Vitals:    01/03/24 2348 01/04/24 0317 01/04/24 0745 01/04/24 0826   BP: 134/60 (!) 142/61 136/70    Pulse: 80 77 79    Resp: 16 16 16 16   Temp: 98.3 °F (36.8 °C) 98.3 °F (36.8 °C) 98.2 °F (36.8 °C)    TempSrc: Oral Oral Oral    SpO2: 96% 95% 95%    Weight:       Height:           Physical examination  General Appearance: alert and cooperative with exam, appears comfortable, no distress  Mouth/Throat: Oral mucosa moist  Neck: No JVD  Lungs: Air entry B/L, no rales, no use of accessory muscles  Heart:  S1, S2 heard  GI: soft, non-tender, no guarding  Extremities: no LE edema    Medications:  Infusion:    sodium chloride 100 mL/hr at 01/04/24 0702    dextrose      sodium chloride       Meds:    insulin glargine  20 Units SubCUTAneous Nightly    insulin lispro  0-16 Units SubCUTAneous Q6H    insulin lispro  6 Units SubCUTAneous Q6H    piperacillin-tazobactam  3,375 mg IntraVENous Q12H    sodium chloride 
[facility]   Nephrology progress note  1/5/2024, 9:10 AM      Pt Name:    Phi Sanz  MRN:     [id number]     YOB: 1950  Admit Date:    12/30/2023  4:02 AM    Chief Complaint: Nephrology following for JOSE    Subjective:  Patient was seen and examined this morning  No chest pain or shortness of breath  s/p day 3 ileocecectomy for perforated appendicitis with abscess   Tolerating diet well  Still complaining of abdominal pain     Objective:  24HR INTAKE/OUTPUT:    Intake/Output Summary (Last 24 hours) at 1/5/2024 0910  Last data filed at 1/5/2024 0814  Gross per 24 hour   Intake 2443.12 ml   Output 3160 ml   Net -716.88 ml         I/O last 3 completed shifts:  In: 4960.8 [P.O.:100; I.V.:4713.9; IV Piggyback:146.9]  Out: 3440 [Urine:2900; Emesis/NG output:50; Drains:490]  I/O this shift:  In: -   Out: 100 [Drains:100]   Admission weight: 71.5 kg (157 lb 10.1 oz)  Wt Readings from Last 3 Encounters:   01/02/24 77.8 kg (171 lb 8.3 oz)   09/26/23 77.1 kg (170 lb)   08/08/23 75.8 kg (167 lb)        Vitals :   Vitals:    01/04/24 1600 01/04/24 1900 01/05/24 0011 01/05/24 0330   BP:  (!) 154/68 (!) 146/72 (!) 141/71   Pulse:  76 78 78   Resp:  18 20 18   Temp:  98 °F (36.7 °C) 98.1 °F (36.7 °C) 98 °F (36.7 °C)   TempSrc:  Oral  Oral   SpO2: 95% 96% 94% 96%   Weight:       Height:           Physical examination  General Appearance: alert and cooperative with exam, appears comfortable, no distress  Mouth/Throat: Oral mucosa moist  Neck: No JVD  Lungs: Air entry B/L, no rales, no use of accessory muscles  Heart:  S1, S2 heard  GI: soft, non-tender, no guarding  Extremities: no LE edema    Medications:  Infusion:    sodium chloride 100 mL/hr at 01/05/24 0541    dextrose      sodium chloride       Meds:    potassium bicarb-citric acid  40 mEq Oral Once    insulin glargine  20 Units SubCUTAneous Nightly    insulin lispro  0-16 Units SubCUTAneous Q6H    insulin lispro  6 Units SubCUTAneous 
[facility]   Nephrology progress note  1/6/2024, 10:51 AM      Pt Name:    Phi Sanz  MRN:     [id number]     YOB: 1950  Admit Date:    12/30/2023  4:02 AM    Chief Complaint: Nephrology following for acute kidney injury.    Subjective:  Patient seen and examined  No chest pain or shortness of breath  Feels okay making excellent urine output    Objective:  24HR INTAKE/OUTPUT:    Intake/Output Summary (Last 24 hours) at 1/6/2024 1051  Last data filed at 1/6/2024 0907  Gross per 24 hour   Intake 2190 ml   Output 4520 ml   Net -2330 ml      Admission weight: 71.5 kg (157 lb 10.1 oz)  Wt Readings from Last 3 Encounters:   01/06/24 86.9 kg (191 lb 9.3 oz)   09/26/23 77.1 kg (170 lb)   08/08/23 75.8 kg (167 lb)        Vitals :   Vitals:    01/06/24 0015 01/06/24 0344 01/06/24 0415 01/06/24 0852   BP: (!) 154/75 (!) 163/78 (!) 158/72 (!) 166/77   Pulse: 76 75 72 70   Resp: 18 15 16 16   Temp: 98.3 °F (36.8 °C) 98.3 °F (36.8 °C) 98.4 °F (36.9 °C) 97.7 °F (36.5 °C)   TempSrc: Oral Oral Oral Axillary   SpO2: 95% 98% 98% 98%   Weight:  86.9 kg (191 lb 9.3 oz)     Height:           Physical examination  General Appearance:  Well developed. No distress  Mouth/Throat:  Oral mucosa moist  Neck:  Supple, no JVD  Lungs:  Breath sounds: clear  Heart::  S1,S2 heard  Abdomen:  Soft, non - tender  Musculoskeletal:  Edema -not significant    Medications:  Infusion:    sodium chloride 100 mL/hr at 01/06/24 0030    dextrose      sodium chloride       Meds:    [Held by provider] insulin glargine  15 Units SubCUTAneous Nightly    [Held by provider] insulin lispro  0-8 Units SubCUTAneous TID WC    [Held by provider] insulin lispro  0-4 Units SubCUTAneous Nightly    piperacillin-tazobactam  3,375 mg IntraVENous Q12H    sodium chloride flush  5-40 mL IntraVENous 2 times per day    amiodarone  100 mg Oral Daily    [Held by provider] amLODIPine  5 mg Oral Daily    busPIRone  10 mg Oral BID    gabapentin  
[facility]   Nephrology progress note  1/7/2024, 10:17 AM      Pt Name:    Phi Sanz  MRN:     [id number]     YOB: 1950  Admit Date:    12/30/2023  4:02 AM    Chief Complaint: Nephrology following for acute kidney injury.    Subjective:  Patient seen and examined  No chest pain or shortness of breath  Feels okay making excellent urine output    Objective:  24HR INTAKE/OUTPUT:    Intake/Output Summary (Last 24 hours) at 1/7/2024 1017  Last data filed at 1/7/2024 0452  Gross per 24 hour   Intake 1318 ml   Output 2175 ml   Net -857 ml        Admission weight: 71.5 kg (157 lb 10.1 oz)  Wt Readings from Last 3 Encounters:   01/07/24 91.2 kg (201 lb 1 oz)   09/26/23 77.1 kg (170 lb)   08/08/23 75.8 kg (167 lb)        Vitals :   Vitals:    01/07/24 0019 01/07/24 0345 01/07/24 0452 01/07/24 0855   BP:  (!) 156/75  (!) 155/75   Pulse:  70  72   Resp: 18 18  19   Temp:  98.1 °F (36.7 °C)  98 °F (36.7 °C)   TempSrc:  Oral  Oral   SpO2:  95%  96%   Weight:   91.2 kg (201 lb 1 oz)    Height:           Physical examination  General Appearance:  Well developed. No distress  Mouth/Throat:  Oral mucosa moist  Neck:  Supple, no JVD  Lungs:  Breath sounds: clear  Heart::  S1,S2 heard  Abdomen:  Soft, non - tender  Musculoskeletal:  Edema -not significant    Medications:  Infusion:    sodium chloride 125 mL/hr at 01/06/24 2241    dextrose      sodium chloride       Meds:    [Held by provider] insulin glargine  15 Units SubCUTAneous Nightly    [Held by provider] insulin lispro  0-8 Units SubCUTAneous TID WC    [Held by provider] insulin lispro  0-4 Units SubCUTAneous Nightly    piperacillin-tazobactam  3,375 mg IntraVENous Q12H    sodium chloride flush  5-40 mL IntraVENous 2 times per day    amiodarone  100 mg Oral Daily    [Held by provider] amLODIPine  5 mg Oral Daily    busPIRone  10 mg Oral BID    gabapentin  300 mg Oral Nightly    levothyroxine  50 mcg Oral Daily    atorvastatin  10 mg Oral 
[facility]   Nephrology progress note  1/8/2024, 9:48 AM      Pt Name:    Phi Sanz  MRN:     [id number]     YOB: 1950  Admit Date:    12/30/2023  4:02 AM    Chief Complaint: Nephrology following for acute kidney injury.    Subjective:  Patient seen and examined  Feels ok. Good urine output.     Objective:  24HR INTAKE/OUTPUT:    Intake/Output Summary (Last 24 hours) at 1/8/2024 0948  Last data filed at 1/8/2024 0904  Gross per 24 hour   Intake 6982.26 ml   Output 2810 ml   Net 4172.26 ml      Admission weight: 71.5 kg (157 lb 10.1 oz)  Wt Readings from Last 3 Encounters:   01/08/24 82.8 kg (182 lb 8.7 oz)   09/26/23 77.1 kg (170 lb)   08/08/23 75.8 kg (167 lb)        Vitals :   Vitals:    01/07/24 2051 01/07/24 2327 01/08/24 0400 01/08/24 0630   BP:  (!) 161/77 (!) 160/74    Pulse:  77 72    Resp: 18 19 18    Temp:  98.4 °F (36.9 °C) 98.2 °F (36.8 °C)    TempSrc:  Oral Oral    SpO2:  94% 95%    Weight:    82.8 kg (182 lb 8.7 oz)   Height:           Physical examination  General Appearance:  Well developed. No distress  Mouth/Throat:  Oral mucosa moist  Neck:  Supple, no JVD  Lungs:  Breath sounds: clear  Heart::  S1,S2 heard  Abdomen:  Soft, non - tender  Musculoskeletal:  Edema -not significant    Medications:  Infusion:    dextrose      sodium chloride       Meds:    amLODIPine  5 mg Oral Daily    insulin glargine  15 Units SubCUTAneous Nightly    insulin lispro  0-8 Units SubCUTAneous TID WC    insulin lispro  0-4 Units SubCUTAneous Nightly    piperacillin-tazobactam  3,375 mg IntraVENous Q12H    sodium chloride flush  5-40 mL IntraVENous 2 times per day    amiodarone  100 mg Oral Daily    busPIRone  10 mg Oral BID    gabapentin  300 mg Oral Nightly    levothyroxine  50 mcg Oral Daily    atorvastatin  10 mg Oral Nightly    QUEtiapine  100 mg Oral BID    [Held by provider] tamsulosin  0.4 mg Oral Daily       Lab Data :  CBC:   Recent Labs     01/06/24  0404 01/07/24  0424 
[facility]   Nephrology progress note  1/9/2024, 10:46 AM      Pt Name:    Phi Sanz  MRN:     [id number]     YOB: 1950  Admit Date:    12/30/2023  4:02 AM    Chief Complaint: Nephrology following for acute kidney injury.    Subjective:  Patient seen and examined this morning   Meraz was removed. He needed straight cathed twice overnight and then had incontinent episode with zero residual.  He is now agreeable to SNF.    Objective:  24HR INTAKE/OUTPUT:    Intake/Output Summary (Last 24 hours) at 1/9/2024 1046  Last data filed at 1/9/2024 0044  Gross per 24 hour   Intake 1257.59 ml   Output 1705 ml   Net -447.41 ml      Admission weight: 71.5 kg (157 lb 10.1 oz)  Wt Readings from Last 3 Encounters:   01/09/24 86.4 kg (190 lb 7.6 oz)   09/26/23 77.1 kg (170 lb)   08/08/23 75.8 kg (167 lb)        Vitals :   Vitals:    01/08/24 2000 01/09/24 0044 01/09/24 0406 01/09/24 0743   BP: (!) 144/65 (!) 137/57 (!) 155/68 (!) 151/68   Pulse: 83 85 91 86   Resp: 16 16 16 17   Temp: 98.5 °F (36.9 °C) 100 °F (37.8 °C) 98.9 °F (37.2 °C) 98.9 °F (37.2 °C)   TempSrc: Oral Oral Oral Oral   SpO2:  93% 91% 91%   Weight:   86.4 kg (190 lb 7.6 oz)    Height:           Physical examination  General Appearance:  Well developed. No distress  Mouth/Throat:  Oral mucosa moist  Neck:  Supple, no JVD  Lungs:  Breath sounds: clear  Heart::  S1,S2 heard  Abdomen:  Soft, non - tender  Musculoskeletal:  Edema -not significant    Medications:  Infusion:    dextrose      sodium chloride       Meds:    sodium bicarbonate  1,300 mg Oral BID    amLODIPine  5 mg Oral Daily    apixaban  5 mg Oral BID    insulin glargine  20 Units SubCUTAneous Nightly    insulin lispro  0-8 Units SubCUTAneous TID WC    insulin lispro  0-4 Units SubCUTAneous Nightly    sodium chloride flush  5-40 mL IntraVENous 2 times per day    amiodarone  100 mg Oral Daily    busPIRone  10 mg Oral BID    gabapentin  300 mg Oral Nightly    
[facility]   Nephrology progress note  12/31/2023, 11:53 AM      Pt Name:    Phi Sanz  MRN:     [id number]     YOB: 1950  Admit Date:    12/30/2023  4:02 AM    Chief Complaint: Nephrology following for JOSE/CKD.    Subjective:  Patient was seen this morning.   Diarrhea seems improving.   Still has some abd pain.   Urine output is low.     Objective:  24HR INTAKE/OUTPUT:    Intake/Output Summary (Last 24 hours) at 12/31/2023 1153  Last data filed at 12/31/2023 0411  Gross per 24 hour   Intake 3001.49 ml   Output --   Net 3001.49 ml         I/O last 3 completed shifts:  In: 3525.1 [P.O.:740; I.V.:2596.3; IV Piggyback:188.8]  Out: 200 [Urine:200]  No intake/output data recorded.   Admission weight: 71.5 kg (157 lb 10.1 oz)  Wt Readings from Last 3 Encounters:   12/31/23 74.5 kg (164 lb 3.9 oz)   09/26/23 77.1 kg (170 lb)   08/08/23 75.8 kg (167 lb)        Vitals :   Vitals:    12/31/23 0400 12/31/23 0441 12/31/23 0800 12/31/23 1118   BP: 118/63  119/67 101/64   Pulse: 88  84 80   Resp: 18  20 18   Temp: 98.5 °F (36.9 °C)  98.6 °F (37 °C) 98.2 °F (36.8 °C)   TempSrc: Oral  Oral Oral   SpO2: 98%  97% 97%   Weight:  74.5 kg (164 lb 3.9 oz)         Physical examination  General Appearance: alert and cooperative with exam, appears comfortable, no distress  Mouth/Throat: Oral mucosa moist  Neck: No JVD  Lungs: Air entry B/L, no rales, no use of accessory muscles  Heart:  S1, S2 heard  GI: soft, RLQ tenderness  Extremities: no significant edema    Medications:  Infusion:    sodium bicarbonate 75 mEq in sodium chloride 0.45 % 1,000 mL infusion 125 mL/hr at 12/31/23 0920    dextrose      sodium chloride      heparin (PORCINE) Infusion 17 Units/kg/hr (12/31/23 0501)     Meds:    piperacillin-tazobactam  3,375 mg IntraVENous Q12H    insulin lispro  0-4 Units SubCUTAneous TID WC    insulin lispro  0-4 Units SubCUTAneous Nightly    sodium chloride flush  5-40 mL IntraVENous 2 times per day 
[facility]   Dr. Chaitanya Noble MD  Daily Progress Note  Pt Name: Phi Sanz  Medical Record Number: [id number]  Date of Birth 1950   Today's Date: 1/1/2024    HD#3    CHIEF COMPLAINT possible perforated appendicitis versus colitis of the right colon    SUBJECTIVE  Patient denies any nausea or vomiting no significant diarrhea at this time persist with right lower quadrant pain    OBJECTIVE  CURRENT VITALS /65   Pulse 78   Temp 98.2 °F (36.8 °C)   Resp 20   Wt 76.1 kg (167 lb 12.3 oz)   SpO2 97%   BMI 22.75 kg/m²   LUNGS: Lungs clear   ABDOMEN: All quadrants soft but right lower quadrant  WOUNDS: Not applicable  24 HR INTAKE/OUTPUT :   Intake/Output Summary (Last 24 hours) at 1/1/2024 0810  Last data filed at 12/31/2023 1534  Gross per 24 hour   Intake 1806.9 ml   Output --   Net 1806.9 ml     DRAIN/TUBE OUTPUT :      LABS  CBC :   Lab Results   Component Value Date/Time    WBC 9.7 01/01/2024 03:42 AM    HGB 8.7 01/01/2024 03:42 AM    HCT 26.4 01/01/2024 03:42 AM     01/01/2024 03:42 AM     BMP:   Lab Results   Component Value Date/Time     01/01/2024 03:42 AM    K 4.2 01/01/2024 03:42 AM    CL 98 01/01/2024 03:42 AM    CO2 17 01/01/2024 03:42 AM    BUN 62 01/01/2024 03:42 AM    CREATININE 5.7 01/01/2024 03:42 AM    MG 2.3 12/31/2023 03:42 AM    PHOS 4.4 01/01/2024 03:42 AM       ASSESSMENT  1.  Patient persists with right lower quadrant pain and a firmness is growing pathogenic E. coli however his abdominal exam is not improving he has no diffuse peritonitis we will repeat CT scan of the abdomen and pelvis today patient may still require surgery which could include a right colon resection we will await results of CT scan      PLAN  1.  Repeat CT scan today      Chaitanya Noble MD  Electronically signed 1/1/2024 at 8:10 AM    
[facility]  OCCUPATIONAL THERAPY MISSED TREATMENT NOTE  [facility] CCU-STEPDOWN 3B  3B-32/032-A      Date: 1/3/2024  Patient Name: Phi Sanz        CSN: [id number]   : 1950  (73 y.o.)  Gender: male                REASON FOR MISSED TREATMENT: Family Refused; patient supine in bed with telesitter present and NG tube in place. Patient stated that too many people have come in room too close together and refusing OT eval and to sit EOB. Edu patient in benefits of OT with patient continuing to refuse. OT to check back as able and time allows.               
Patient arrived per cart to 3B. Heart monitor applied and vitals taken.  Admission paperwork completed.  Explained to patient that [facility] is not responsible for any lost or stolen items.  Patient verbalized understanding. Oriented to room and use of call light and bed controls.  Patient denies pain or needs. No signs of distress noted.  Bed locked & in low position, side-rails up x2.  Call light in reach.  Reminded patient to call nurse if any needs arise.     2 person skin assessment performed by this nurse and Gavin ABURTO.      
Patient educated on how to use incentive spirometer. Patient verbalized understanding and demonstrated proper use. Emphasized importance and usage of device, with coughing and deep breathing every 1 hours    
Patient educated on how to use incentive spirometer. Patient verbalized understanding and demonstrated proper use. Emphasized importance and usage of device, with coughing and deep breathing every 1 hours while awake.         
Patient educated on how to use incentive spirometer. Patient verbalized understanding and demonstrated proper use. Emphasized importance and usage of device, with coughing and deep breathing every 1 hours.     
Patient educated on how to use incentive spirometer. Patient verbalized understanding and demonstrated proper use. Emphasized importance and usage of device, with coughing and deep breathing every 4 hours while awake.        
Patient educated on how to use incentive spirometer. Patient verbalized understanding and demonstrated proper use. Emphasized importance and usage of device, with coughing and deep breathing every 4 hours while awake. Pt was agreeable.         
Patient refusing straight cath at this time. Bladder scan shows 343 post void.   
Patient to operating room, no glasses, contacts, dentures, hearing aids, or jewelery. All personal items left in room on floor    
Radiology called for results of abdominal CT, Dr Coleman received results via phone.   
Report called to Nelsy ABURTO. Patient transferring to 8A12. Order received for one time 2gm Magnesium Sulfate per Dr. Richardson.  
[facility]  OCCUPATIONAL THERAPY MISSED TREATMENT NOTE  STRZ CCU-STEPDOWN 3B  3B-32/032-A      Date: 2024  Patient Name: Phi Sanz        CSN: [id number]   : 1950  (73 y.o.)  Gender: male                REASON FOR MISSED TREATMENT: OT attempted X2 this date. In AM, pt declined due to pain. CHecked back after pt had received pain, and pt was having echo completed. Will check back as able           
Spoke with Dr Howell at [facility] re; Phi    HX- CKD, Diabetes.    C/O several episodes of diarrhea today.  He became dizzy this evening so wife called EMS.    On arrival BP-91/42    Given -2L IVF  Rocephin  Flagyl    Now BP-107/55  93, 20. 100% RA  T-98.2    Cr=6.2 (3.0- 2 months ago)  BUN=63  WBC=14  H=10.5  K+= #1-5.8 , #2-4.4  Glucose=60s ( D5NS @ 60)  Lactic=1.1    CT ABD- (limited because of contrast)  Suspicious for ischemic bowel. Moderate small bowel distention. ? Obstruction.    Dr. Howell says these do not correlate clinically.  No guarding, no rebound, no free air, no vomiting.  \"He looks good\"    They spoke with Dr. Richardson.    Accepted on behalf of hospitalist.    
[facility]  STRZ MED SURG 8AB  Occupational Therapy  Daily Note  Time:   Time In: 1139  Time Out: 1203  Timed Code Treatment Minutes: 24 Minutes  Minutes: 24          Date: 2024  Patient Name: Phi Sanz,   Gender: male      Room: -12/012-A  MRN: [id number]  : 1950  (73 y.o.)  Referring Practitioner: Chaitanya Noble MD  Diagnosis: JOSE  Additional Pertinent Hx: Per EMR: Phi Sanz is a 73 y.o. male with a PMH of paroxysmal atrial fibrillation, hyperlipidemia, insulin-dependent diabetes, hypertension, CKD 4 who presented to [facility] with diarrhea and mild abdominal pain.  States he has had waxing and waning diarrhea for at least 4 years and alternates between laxatives and Imodium.  Was too weak to walk and wife brought him to the emergency department. Pt found to have Perforated appendicitis with a large right  lower quadrant abscess. Pt s/p Abdominal exploration, Drainage of pelvic abscess, Ileocecectomy  on 23.    Restrictions/Precautions:  Restrictions/Precautions: General Precautions, Fall Risk  Position Activity Restriction  Other position/activity restrictions: R foot drop, 1/5 L ankle (no AFO's)     SUBJECTIVE: Pt lying supine upon arrival, agreeable to OT session.    PAIN: Did not rate: Abdomen (Incision)    Vitals: Vitals not assessed per clinical judgement, see nursing flowsheet    COGNITION: Decreased Problem Solving, Decreased Safety Awareness, and Impaired Attention    ADL:   Footwear Management: Maximum Assistance.  Adjusting socks onto B feet after pt unable to complete while seated EOB .    BALANCE:  Sitting Balance:  Stand By Assistance. EOB  Standing Balance: Minimal Assistance, X 1.     BED MOBILITY:  Supine to Sit: Minimal Assistance, X 1    Scooting: Minimal Assistance, X 1 EOB    TRANSFERS:  Sit to Stand:  Minimal Assistance, X 1.   Stand to Sit: Minimal Assistance, X 1.     FUNCTIONAL MOBILITY:  Assistive Device: Rolling Walker 
Surg CT noted pt not improved  plan OR this evening discussed with pt  
Surg POD#4  abd soft AVELINA serosang  BS+ jonny cl liq  will inc to full liq  
Surg POD#6  abd soft pain as expected  pt jonny reg diet AVELINA serosang  will remove AVELINA  monitor po in  
Surg POD#7 Patient now on8 A  Patient states he is tolerating diet is passing gas having loose stoolsJP was removed abdomen is softWhite blood cell count noted to be mildly increased at 13.7 but no signs of infectionFrom my standpoint appears to be doing betterDiscussed with patient he is hoping to go to nursing homeReviewed  noteOkay for discharge standpoint  
Surg POD#8Patient resting in recliner feels well abdomen is softBowel sounds are present wound is intact with Steri-Strips in placeAwaiting nursing home placement okay for discharge to nursing home when arrangements made  
Surg Pt seen consult to follow concern for perf appendicitis  hx of diarrhea x 5 days Hx not consistentwith appendicitis  tender with guarding RLQ   I favor colitis no need for urgent OR  pt with Cr 6.4  discussed with Dr Richardson will follow  
[facility]  INPATIENT PHYSICAL THERAPY  EVALUATION  [facility] CCU-STEPDOWN 3B - 3B-32/032-A    Time In: 1038  Time Out: 1101  Timed Code Treatment Minutes: 10 Minutes  Minutes: 23        Date: 2024  Patient Name: Phi Sanz,  Gender:  male        MRN: [id number]  : 1950  (73 y.o.)      Referring Practitioner: Chaitanya Noble MD  Diagnosis: JOSE (acute kidney injury)  Additional Pertinent Hx: Per EMR: Phi Sanz is a 73 y.o. male with a PMH of paroxysmal atrial fibrillation, hyperlipidemia, insulin-dependent diabetes, hypertension, CKD 4 who presented to [facility] with diarrhea and mild abdominal pain. Pt found to have Perforated appendicitis with a large right  lower quadrant abscess. Pt s/p Abdominal exploration, Drainage of pelvic abscess, Ileocecectomy on 23 by Dr Noble.     Restrictions/Precautions:  Restrictions/Precautions: General Precautions, Fall Risk  Position Activity Restriction  Other position/activity restrictions: R foot drop, 1/5 L ankle (no AFO's)    Subjective:  Chart Reviewed: Yes  Patient assessed for rehabilitation services?: Yes  Family / Caregiver Present: No  Subjective: RN approved session, pt is seated in recliner, agreeable to PT.    General:  Overall Orientation Status: Within Functional Limits  Vision: Within Functional Limits  Hearing: Within functional limits       Pain: 9/10    Vitals: Vitals not assessed per clinical judgement, see nursing flowsheet    Social/Functional History:    Lives With: Spouse  Type of Home: House  Home Layout: One level  Home Access: Stairs to enter with rails  Entrance Stairs - Number of Steps: 6  Home Equipment: Rollator, Wheelchair-manual     Bathroom Shower/Tub: Tub/Shower unit  Bathroom Toilet: Handicap height  Bathroom Equipment: Tub transfer bench       ADL Assistance: Independent  Homemaking Assistance: Needs assistance (gets meals on wheels; pt reported other IADL tasks \"just don't get 
VS Temp 99.0 Hr79, resp 18, BP                                                                                                                                                                      127/53. 94%/RA Pt denies acute distress.However he has had 2 episodes of loose bloody stools. Medium,and large. Hospitalist Delores driver served texted and informed. Lab to be drawn in am.  
[facility]   PROGRESS NOTE      Patient: Phi Sanz  Room #: 3B-32/032-A            YOB: 1950  Age: 73 y.o.  Gender: male            Admit Date & Time: 12/30/2023  4:02 AM    Assessment:    The patient declined a visit.    Interventions:  The patient was provided information about Spiritual Care being available.     Outcomes:  The  politely wished the patient a positive day.     Plan:  1.Spiritual care will continue to follow the patient according to [facility] spiritual care SOP.       Electronically signed by Chaplain Genna, on 1/7/2024 at 2:46 PM.  Spiritual Care Department  [facility]  [phone]    
[facility]  OCCUPATIONAL THERAPY MISSED TREATMENT NOTE  STRZ MED SURG 8AB  8A-12/012-A      Date: 2024  Patient Name: Phi Sanz        CSN: [id number]   : 1950  (73 y.o.)  Gender: male   Referring Practitioner: Chaitanya Noble MD  Diagnosis: JOSE         REASON FOR MISSED TREATMENT: RN reports Pt. will receive a blood transfusion this morning due to lower level at 6.5.  Will re-attempt later this date as time allows.                
Retacrit x 1 dose on 1/11  PAF with probable secondary hypercoagulable state--amiodarone 100 mg daily, eliquis on hold with #3  Mod/Severe AS, mild AR, trace MR, mild TR--noted per echo 2018 in care everywhere   Severe AS with CAREN 1.0 cm2--per pt follows with cardio in [place];  monitor fluid status as above  Chronic diastolic heart failure- per echo 1/4/24 with grade 1 DD  Primary HTN, uncontrolled--Norvasc 5 mg daily; holding home lisinopril for JOSE; monitor  Diabetes mellitus type 2, controlled--Lantus to 20 units daily; on medium dose sliding scale, A1C 12/31/23 = 6.2; follows with endocrinology in [place]  Moderate pericardial effusion--noted per echo 1/4/24 - no tamponade findings on echo or clinically; monitor  HLD--statin  Hypothyroidism--home synthroid; follows with Endocrinology in [place]  Anxiety disorder--Buspar, seroquel;  monitor  Probable diabetic neuropathy--Neurontin  GERD -- PPI held with JOSE   BPH--Flomax   Generalized weakness/physical deconditioning--PT/OT consulted; patient agrees to SNF and awaiting pre-CERT  CODE STATUS--limited x 4 per palliative care      Expected discharge date: Awaiting pre-CERT and medical stability    Disposition:    [] Home       [] TCU       [] Rehab       [] Psych       [x] SNF       [] Long Term Care Facility       [] Other-    Chief Complaint: abdominal pain, suspected ruptured appendicitis, diarrhea     Hospital Course: Per initial H&P done 12/30/2023: \"Phi Sanz is a 73 y.o. male with a PMH of paroxysmal atrial fibrillation, hyperlipidemia, insulin-dependent diabetes, hypertension, CKD 4 who presented to [facility] with diarrhea and mild abdominal pain.  States he has had waxing and waning diarrhea for at least 4 years and alternates between laxatives and Imodium.  Was too weak to walk and wife brought him to the emergency department.  Denied fever, chills, nausea, vomiting, chest pain, shortness of breath, urinary symptoms.     Was seen at 
levothyroxine  50 mcg Oral Daily    atorvastatin  10 mg Oral Nightly    QUEtiapine  100 mg Oral BID    tamsulosin  0.4 mg Oral Daily       Lab Data :  CBC:   Recent Labs     01/14/24  0747 01/14/24  1549 01/15/24  0544 01/15/24  0846 01/16/24  0535   WBC 7.0  --  7.0  --  9.7   HGB 7.1*   < > 8.0* 7.9* 8.7*  8.4*   HCT 22.2*   < > 24.6* 25.1* 26.6*  25.5*     --  252  --  265    < > = values in this interval not displayed.     CMP:  Recent Labs     01/14/24  0747 01/15/24  0544 01/16/24  0535    138 138   K 3.7 3.6 3.7    107 107   CO2 20* 22* 22*   BUN 30* 29* 27*   CREATININE 3.2* 3.0* 3.2*   GLUCOSE 124* 166* 133*   CALCIUM 7.8* 8.0* 8.0*   PHOS 3.6 3.6 3.4     Hepatic: No results for input(s): \"LABALBU\", \"AST\", \"ALT\", \"ALB\", \"BILITOT\", \"ALKPHOS\" in the last 72 hours.    Assessment and Plan:  Renal -acute kidney injury most likely secondary to prerenal/ATN  Improving. No need for RRT. Back to his baseline  Ok for DC from renal standpoint see me in the office in 2-3 weeks with BMP and CBC prior    Lytes: stable  Metabolic acidosis: continue oral bicarb  Bilateral renal cysts : monitor as outpatient will obtain renal US in outpatient setting  Essential hypertension, stable  Severe aortic stenosis  Anemia: s/p blood transfusion. MARGA started.  Hold IV iron as ferritin is in 1000s  Perforated appendix with abscess status post surgery 1/2/2024        Natalia Richardson,   [facility]    This report has been created using voice recognition software. It may contain minor errors which are inherent in voice recognition technology  
occasional left lateral lean due to pain. Patient sitting EOB for 10 minutes due to abdomen incision site observation by doctor and due to pain and fatigue  Standing Balance: Moderate Assistance, X 1, with cues for safety, with verbal cues , with increased time for completion. Extensive kyphotic posture with forward flexion requiring cues for upright standing.     BED MOBILITY:  Rolling to Left: Minimal Assistance, X 1, with head of bed flat, with rail, with verbal cues , with increased time for completion patient assisted in and educated in use of LE for pushing and UE use on railing for assist, as well as placement of other extemity for pushing self up from bed   Supine to Sit: Moderate Assistance, X 1, with head of bed raised, with rail, with verbal cues , with increased time for completion patient assisted in and educated in use of LE for pushing and UE use on railing for assist, as well as placement of other extemity for pushing self up from bed  Scooting: Moderate Assistance, X 1, with head of bed flat, with rail, with verbal cues , with increased time for completion scooting self to EOB for balance and stability    TRANSFERS:  Sit to Stand:  Moderate Assistance, X 1, with increased time for completion, cues for hand placement, with verbal cues. EOB to standing with 2ww  Stand to Sit: Moderate Assistance, X 1, with increased time for completion, cues for hand placement, with verbal cues. From 2ww to recliner. Requires cues for reaching hands back and slow descend down to recliner for safety    FUNCTIONAL MOBILITY:  Assistive Device: Rolling Walker  Assist Level:  Moderate Assistance, X 1, with set-up, with verbal cues , and with increased time for completion.   Distance:  3 feet from EOB to reclienr  Significant forward flexion with kyphotic posture and LE dragging with attempts to ambulate forward to recliner requiring cuing and assist for safety.      ADDITIONAL ACTIVITIES:  Patient completed BUE 
quitting medication without provider approval to do is not desirable. Reviewed blood sugar trends with pt this admit, pt states he checks his blood sugar 1x/day PTA. Pt refuses all ONS use when PO diet resumed.  GI Status: BM 1/1 - x3, 1/2 - x1  Pertinent Labs: Na 133, BUN 58, Creatinine 5.7, glucose 274, phosphorus 4.3, HgbA1C 6.2% (12/31/23)  Pertinent Meds: lipitor, lantus, humalog, heparin, synthroid, zosyn      Wound Type: Stage II (coccyx)       Current Nutrition Intake & Therapies:    Average Meal Intake: 26-50% (NPO now for surgery)  Average Supplements Intake: None Ordered (refuses all ONS)  Diet NPO Exceptions are: Sips of Water with Meds    Anthropometric Measures:  Height: 182.9 cm (6' 0.01\") (8/8/23)  Ideal Body Weight (IBW): 178 lbs (81 kg)    Admission Body Weight: 71.5 kg (157 lb 10 oz) (12/30: no edema noted)  Current Body Weight: 77.8 kg (171 lb 8.3 oz) (1/2: no edema noted, variable wts this admit), 96.4 % IBW  Current BMI (kg/m2): 23.3  Usual Body Weight:  (per EMR: 1/27/23: 187.6#, 9/26/23: 170#)     Weight Adjustment For: No Adjustment                 BMI Categories: Normal Weight (BMI 22.0 to 24.9) age over 65    Estimated Daily Nutrient Needs:  Energy Requirements Based On: Kcal/kg  Weight Used for Energy Requirements: Current (77.8 kg)  Energy (kcal/day): 7591-9820 kcals (25-30 kcals/kg)  Weight Used for Protein Requirements: Current (77.8 kg)  Protein (g/day): 47-78 grams (0.6-1.0 grams) pending renal status, to promote wound healing efforts       Nutrition Diagnosis:   Moderate malnutrition, In context of chronic illness related to pain, endocrine dysfuntion as evidenced by Criteria as identified in malnutrition assessment    Nutrition Interventions:   Food and/or Nutrient Delivery: Continue NPO (advance as medically appropriate per provider)  Nutrition Education/Counseling: Education initiated (reinforced importance of taking DM medications and eating consistently to prevent unintentional 
    D/W patient and RN    Wilbur Patrick MD case discussed and reviewed with Dr. Richardson  [facility]    This report has been created using voice recognition software. It may contain minor errors which are inherent in voice recognition technology   
  K 4.4 4.2    100   CO2 18* 15*   BUN 63* 62*   CREATININE 6.4* 6.5*   CALCIUM 8.8 8.2*   PHOS 3.6 4.0     Recent Labs     12/30/23  0612 12/31/23  0342   AST 28 18   ALT 20 16   BILIDIR  --  <0.2   BILITOT 0.2* 0.2*   ALKPHOS 94 82     Recent Labs     12/30/23  1534   INR 1.23*     No results for input(s): \"CKTOTAL\", \"TROPONINI\" in the last 72 hours.    Microbiology:    Blood culture #1:   Lab Results   Component Value Date/Time    BC No growth 24 hours. 12/30/2023 06:12 AM    BC No growth 24 hours. 12/30/2023 06:12 AM       Blood culture #2:No results found for: \"BLOODCULT2\"    Organism:  Lab Results   Component Value Date/Time    ORG Growth of Contaminants 12/30/2023 04:40 AM       No results found for: \"LABGRAM\"    MRSA culture only:No results found for: \"MRSAC\"    Urine culture:   Lab Results   Component Value Date/Time    LABURIN  12/30/2023 04:40 AM     No significant pathogens isolated. Growth likely represents skin anders or distal urethral anders.       Respiratory culture: No results found for: \"CULTRESP\"    Aerobic and Anaerobic :  No results found for: \"LABAERO\"  No results found for: \"LABANAE\"    Urinalysis:      Lab Results   Component Value Date/Time    NITRU NEGATIVE 12/30/2023 04:40 AM    WBCUA 2-4 12/30/2023 04:40 AM    BACTERIA FEW 12/30/2023 04:40 AM    RBCUA 3-5 12/30/2023 04:40 AM    BLOODU SMALL 12/30/2023 04:40 AM    SPECGRAV 1.015 12/30/2023 04:40 AM    GLUCOSEU  09/13/2021 12:00 AM      Comment:      500       Radiology:  CT INTERPRETATION OF OUTSIDE IMAGES   Final Result   1. A tubular structure within the right lower quadrant containing enteric    contrast and locules of air. A single locule of free air within the right    lower quadrant on image 95 series 2. Extensive inflammatory stranding and    edema within the right lower quadrant. Favor perforated appendicitis,    recommend surgical evaluation. Developing phlegmon or abscess within the    right lower quadrant not completely 
1 with RW for several reps in order to complete pericare after several bouts of urine and stool incontinence. Pt able to stand for only about 1 minute at a time before requesting to sit down. Unable to stand up straight due to reported back pain and overall weakness.    Dynamic Standing Balance: Moderate Assistance    Exercise:  Patient was guided in 1 set(s) 10 reps of exercise to both lower extremities.  Ankle pumps, Glut sets, Quad sets, Heelslides, Hip abduction/adduction, and Straight leg raises.  Exercises were completed for increased independence with functional mobility.    Functional Outcome Measures: Completed  -PAC Inpatient Mobility without Stair Climbing Raw Score : 13  -PAC Inpatient without Stair Climbing T-Scale Score : 38.96  Modified Nemo Scale:  Not Applicable    ASSESSMENT:  Assessment: Patient progressing toward established goals.  Activity Tolerance:  Patient tolerance of treatment: good, limited by fatigue   Equipment Recommendations:Equipment Needed: No  Discharge Recommendations: Subacte/Skilled Nursing Facility  Plan: Current Treatment Recommendations: Strengthening, Balance training, Functional mobility training, Transfer training, Gait training, Wheelchair mobility training, Stair training, Neuromuscular re-education, Patient/Caregiver education & training, Safety education & training, Therapeutic activities  General Plan:  (5x GM)    Education  Education:  Learners: Patient  Patient Education: Bed Mobility, Transfers, Gait, Up in Chair for All Meals, Verbal Exercise Instruction,  - Patient Verbalized Understanding, - Patient Requires Continued Education    Goals:  Patient Goals : to go home  Short Term Goals  Time Frame for Short Term Goals: by discharge  Short Term Goal 1: Pt to transfer supine <--> sit S to enable pt to get in/out of bed.  Short Term Goal 2: Pt to transfer sit <--> stand S for increased functional mobility.  Short Term Goal 3: Pt to ambulate >15 feet with RW SBA 
additional 5 feet from BSC to recliner, then additional 3 feet from recliner to EOB   Cues for upright posture, pacing self      ADDITIONAL ACTIVITIES:    AM-PAC Inpatient Daily Activity Raw Score: 15  AM-PAC Inpatient ADL T-Scale Score : 34.69  ADL Inpatient CMS 0-100% Score: 56.46    Modified Camden Scale:  +4 - Moderately severe disability; unable to walk and attend to bodily needs without assistance    ASSESSMENT:     Activity Tolerance:  Patient tolerance of  treatment: Fair treatment tolerance, Limited by fatigue, Limited by medical complications, and Reduced activity pace      Discharge Recommendations: Subacute/skilled nursing facility  Equipment Recommendations: Other: will continue to monitor pending progress, discharge location  Plan: Times Per Week: 5x  Current Treatment Recommendations: Strengthening, Balance training, Functional mobility training, Endurance training, Safety education & training, Patient/Caregiver education & training, Equipment evaluation, education, & procurement, Self-Care / ADL    Education:  Learners: Patient  Role of OT, Plan of Care, ADL's, IADL's, Home Safety, Importance of Increasing Activity, and Assistive Device Safety    Goals  Short Term Goals  Time Frame for Short Term Goals: by discharge  Short Term Goal 1: Pt will safely navigate to/from BSC or chair with SBA in prep for ADL completion.  Short Term Goal 2: Pt will complete functional transfers with SBA and 0 cues for safety in prep for BSC transfers.  Short Term Goal 3: Pt will tolerate standing X2 minutes with SBA and unilateral release in prep for toileting tasks.  Short Term Goal 4: Pt will complete LB ADLs with SBA to increase independence with self care tasks.  Long Term Goals  Time Frame for Long Term Goals : not set due to ELOS    Following session, patient left in safe position with all fall risk precautions in place.      
included skilled education and facilitation of tasks to increase safety and independence with ADL's for improved functional independence and quality of life.    Discharge Recommendations:  Subacute/Skilled Nursing Facility (not safe to return home at this time)    Patient Education:     Patient Education  Education Given To: Patient  Education Provided: Role of Therapy, Plan of Care, Fall Prevention Strategies, Transfer Training, Equipment (importance of increasing activity)  Education Method: Demonstration, Verbal  Barriers to Learning: Cognition  Education Outcome: Verbalized understanding, Continued education needed, Unable to demonstrate understanding    Equipment Recommendations:  Other: will continue to monitor pending progress, discharge location    Plan:  Times Per Week: 5x  Current Treatment Recommendations: Strengthening, Balance training, Functional mobility training, Endurance training, Safety education & training, Patient/Caregiver education & training, Equipment evaluation, education, & procurement, Self-Care / ADL.  See long-term goal time frame for expected duration of plan of care.  If no long-term goals established, a short length of stay is anticipated.    Goals:     Short Term Goals  Time Frame for Short Term Goals: by discharge  Short Term Goal 1: Pt will safely navigate to/from BSC or chair with SBA in prep for ADL completion.  Short Term Goal 2: Pt will complete functional transfers with SBA and 0 cues for safety in prep for BSC transfers.  Short Term Goal 3: Pt will tolerate standing X2 minutes with SBA and unilateral release in prep for toileting tasks.  Short Term Goal 4: Pt will complete LB ADLs with SBA to increase independence with self care tasks.  Long Term Goals  Time Frame for Long Term Goals : not set due to ELOS    [facility] Inpatient Daily Activity Raw Score: 16  AM-PAC Inpatient ADL T-Scale Score : 35.96    Following session, patient left in safe position with all fall risk 
quadrant containing enteric    contrast and locules of air. A single locule of free air within the right    lower quadrant on image 95 series 2. Extensive inflammatory stranding and    edema within the right lower quadrant. Favor perforated appendicitis,    recommend surgical evaluation. Developing phlegmon or abscess within the    right lower quadrant not completely excluded without the use of IV    contrast.   2. Majority of the mid and distal small bowel is distended measuring up to    3.5 cm in transverse diameter and containing intraluminal fluid. These    dilated loops of small bowel extend to the terminal ileum. Favor reactive    small bowel ileus given inflammatory process within the right lower    quadrant.   3. Question mucosal thickening involving the stomach pylorus and proximal    duodenal could represent gastritis.   4. Advanced mucosal thickening of the urinary bladder with some stranding    and induration surrounding the bladder. Recommend correlating with urinary    analysis to exclude cystitis.   5. Small volume free fluid within the dependent pelvis which is abnormal    in a male patient and likely reactive given bowel findings.   6. A small pericardial effusion measuring up to 1.3 cm ventrally.      Findingswere discussed with Dr. Coleman on December 30th, 2023 at 5:34 a.m.    Eastern standard time.      This document has been electronically signed by: Jose Amado DO on    12/30/2023 05:51 AM      All CTs at this facility use dose modulation techniques and iterative    reconstructions, and/or weight-based dosing   when appropriate to reduce radiation to a low as reasonably achievable.        No results found.    Electronically signed by Paul Carcamo DO on 1/1/2024 at 7:12 AM     
                           [] SQ Heparin                                 [] Encourage ambulation           [x] Already on Anticoagulation~Eliquis     Code Status: Full Code    PT/OT Eval Status: On the case    Tele:   [x] yes sinus rhythm heart rate 84~stop             [] no    Active Hospital Problems    Diagnosis Date Noted    Polyneuropathy due to type 2 diabetes mellitus (HCC) [E11.42] 09/19/2022     Priority: Medium    Hyperlipidemia [E78.5] 09/19/2022     Priority: Medium    Essential hypertension [I10] 09/19/2022     Priority: Medium    Type 2 diabetes mellitus with insulin therapy (HCC) [E11.9, Z79.4] 09/19/2022     Priority: Medium    Right lower quadrant abdominal abscess (HCC) [K65.1] 01/03/2024    Hyponatremia [E87.1] 01/03/2024    Diarrhea, infectious, adult [A09] 01/03/2024    Thrombocytopenia (HCC) [D69.6] 01/03/2024    Chronic anticoagulation [Z79.01] 01/03/2024    Murmur [R01.1] 01/03/2024    Hypothyroidism [E03.9] 01/03/2024    Generalized weakness [R53.1] 01/03/2024    Benign prostatic hyperplasia without lower urinary tract symptoms [N40.0] 01/03/2024    JOSE (acute kidney injury) (HCC) [N17.9] 12/30/2023    Perforated appendix [K35.32] 12/30/2023    Ileus (HCC) [K56.7] 12/30/2023    Chronic kidney disease (CKD), stage IV (severe) (HCC) [N18.4] 12/30/2023    Metabolic acidosis [E87.20] 06/08/2022    Hyperphosphatemia [E83.39] 06/08/2022    Acute on chronic anemia [D64.9] 03/18/2022    Moderate malnutrition (HCC) [E44.0] 03/07/2022    PAF (paroxysmal atrial fibrillation) (HCC) [I48.0] 12/16/2021       Electronically signed by ELAINE Campbell CNP on 1/10/2024 at 8:17 AM    
09/13/2021 12:00 AM      Comment:      500       Radiology:  XR ABDOMEN (KUB) (SINGLE AP VIEW)    Result Date: 12/31/2023  PROCEDURE: XR ABDOMEN (KUB) (SINGLE AP VIEW) CLINICAL INFORMATION: Abdominal distention TECHNIQUE: 2 AP supine abdominal radiographs COMPARISON: None FINDINGS: Dilated gas-filled small bowel loops throughout the abdomen measure up to 5.1 cm in diameter. There is distal bowel gas. Degenerative changes in the thoracolumbar spine are incompletely visualized.     Dilated small bowel loops with distal bowel gas. Findings may be secondary to paralytic ileus or partial obstruction. Final report electronically signed by Dr. Samir Marinelli on 12/31/2023 1:06 PM      DVT prophylaxis: [] Lovenox                                 [] SCDs                                 [] SQ Heparin                                 [] Encourage ambulation           [x] Already on Anticoagulation~Eliquis on hold secondary to hemoglobin 6.5     Code Status: Full Code    PT/OT Eval Status: On the case    Tele:   [] yes              [x] no    Active Hospital Problems    Diagnosis Date Noted    Polyneuropathy due to type 2 diabetes mellitus (HCC) [E11.42] 09/19/2022     Priority: Medium    Hyperlipidemia [E78.5] 09/19/2022     Priority: Medium    Essential hypertension [I10] 09/19/2022     Priority: Medium    Type 2 diabetes mellitus with insulin therapy (HCC) [E11.9, Z79.4] 09/19/2022     Priority: Medium    Right lower quadrant abdominal abscess (HCC) [K65.1] 01/03/2024    Hyponatremia [E87.1] 01/03/2024    Diarrhea, infectious, adult [A09] 01/03/2024    Thrombocytopenia (HCC) [D69.6] 01/03/2024    Chronic anticoagulation [Z79.01] 01/03/2024    Murmur [R01.1] 01/03/2024    Hypothyroidism [E03.9] 01/03/2024    Generalized weakness [R53.1] 01/03/2024    Benign prostatic hyperplasia without lower urinary tract symptoms [N40.0] 01/03/2024    JOSE (acute kidney injury) (HCC) [N17.9] 12/30/2023    Perforated appendix [K35.32] 12/30/2023 
      Electronically signed by ELAINE Campbell CNP on 1/11/2024 at 1:01 PM    
Lab [address] [place] [id number]       Gastrointestinal Panel, Molecular [[id number]]  (Abnormal) Collected: 12/30/23 1510    Order Status: Completed Specimen: Stool Updated: 12/30/23 2043     Campylobacter PCR Not Detected     Plesiomonas Shigelloides PCR Not Detected     Salmonella PCR Not Detected     Vibrio PCR Not Detected     Vibrio Cholerae PCR Not Detected     Yersinia Enterocolitica PCR Not Detected     E Coli Enteroaggregative PCR Not Detected     E Coli Enteropathogenic PCR Detected     E Coli Enterotoxigenic PCR Not Detected     E Coli Shiga Like Toxin PCR Not Detected     E Coli O157 PCR NA     E Coli Shigella/Enteroinvasive PCR Not Detected     Cryptosporidium PCR Not Detected     Cyclospora Cayetanensis PCR Not Detected     E HISTOLYTICA GI FILM ARRAY Not Detected     Giardia Lamblia PCR Not Detected     Adenovirus F 40 41 PCR Not Detected     Astrovirus PCR Not Detected     Norovirus GI GII PCR Not Detected     Rotavirus A PCR Not Detected     Sapovirus PCR Not Detected     Comment: Limitations:  Nucleic acid may persist in vivo independently of organism viability.  Additionally, some organisms may be carried asymptomatically. Detection of  target organisms does not imply that the corresponding organisms are  infectious or are the causative agent of clinical symptoms. Results must be  correlated with clinical history, epidemiological data and other clinical  information available.  Performed at [facility] [address] [place] [id number]         Culture, Blood 1 [[id number]] Collected: 12/30/23 0612    Order Status: Completed Specimen: Blood Updated: 01/04/24 0806     Blood Culture, Routine No growth 24 hours. No growth 48 hours. No growth at 5 days    Narrative:      Source: blood-Adult-suboptimal <5.5oz./set volume       Site: Peripheral Vein            Current Antibiotics: not stated    Culture, Blood 2 [[id number]] Collected: 12/30/23 0612    Order Status: Completed Specimen: Blood 
TUBE PLACEMENT   Final Result    Appropriately positioned NG tube.      This document has been electronically signed by: Jose Cortez MD on    01/03/2024 03:42 AM      CT ABDOMEN PELVIS WO CONTRAST Additional Contrast? Oral   Final Result      1. Extensive thickening and inflammatory changes focused in the right lower quadrant involving the cecum and adjacent bowel loops. The appendix cannot be definitively visualized. There is questionable free air on series #2 image #73. The overall    appearance of the abdomen has worsened since the previous CT scan.   2. Markedly dilated and fluid-filled small bowel. This finding has worsened since the previous exam and could be related to a bowel obstruction although this may be reactive given the extensive inflammatory changes in the right lower quadrant.   3. Stable pericardial effusion.               **This report has been created using voice recognition software. It may contain minor errors which are inherent in voice recognition technology.**      Final report electronically signed by Dr Cooper Parish on 1/1/2024 6:31 PM      XR ABDOMEN (KUB) (SINGLE AP VIEW)   Final Result      Dilated small bowel loops with distal bowel gas. Findings may be secondary to paralytic ileus or partial obstruction.      Final report electronically signed by Dr. Samir Marinelli on 12/31/2023 1:06 PM      CT INTERPRETATION OF OUTSIDE IMAGES   Final Result   1. A tubular structure within the right lower quadrant containing enteric    contrast and locules of air. A single locule of free air within the right    lower quadrant on image 95 series 2. Extensive inflammatory stranding and    edema within the right lower quadrant. Favor perforated appendicitis,    recommend surgical evaluation. Developing phlegmon or abscess within the    right lower quadrant not completely excluded without the use of IV    contrast.   2. Majority of the mid and distal small bowel is distended measuring up to    3.5 
urinary    analysis to exclude cystitis.   5. Small volume free fluid within the dependent pelvis which is abnormal    in a male patient and likely reactive given bowel findings.   6. A small pericardial effusion measuring up to 1.3 cm ventrally.      Findingswere discussed with Dr. Coleman on December 30th, 2023 at 5:34 a.m.    Eastern standard time.      This document has been electronically signed by: Jose Amado DO on    12/30/2023 05:51 AM      All CTs at this facility use dose modulation techniques and iterative    reconstructions, and/or weight-based dosing   when appropriate to reduce radiation to a low as reasonably achievable.          Electronically signed by Teto Gann MD on 1/6/2024 at 7:13 AM   
26-Oct-2018 06:03

## 2024-01-16 NOTE — PLAN OF CARE
Problem: Discharge Planning  Goal: Discharge to home or other facility with appropriate resources  1/2/2024 1118 by Estrada Richard RN  Outcome: Progressing  Flowsheets (Taken 1/2/2024 0800)  Discharge to home or other facility with appropriate resources:   Identify barriers to discharge with patient and caregiver   Arrange for needed discharge resources and transportation as appropriate   Identify discharge learning needs (meds, wound care, etc)  1/2/2024 0226 by Raquel Moore RN  Outcome: Progressing  Flowsheets (Taken 1/1/2024 2030)  Discharge to home or other facility with appropriate resources:   Identify barriers to discharge with patient and caregiver   Arrange for needed discharge resources and transportation as appropriate   Identify discharge learning needs (meds, wound care, etc)   Refer to discharge planning if patient needs post-hospital services based on physician order or complex needs related to functional status, cognitive ability or social support system     Problem: Skin/Tissue Integrity  Goal: Absence of new skin breakdown  Description: 1.  Monitor for areas of redness and/or skin breakdown  2.  Assess vascular access sites hourly  3.  Every 4-6 hours minimum:  Change oxygen saturation probe site  4.  Every 4-6 hours:  If on nasal continuous positive airway pressure, respiratory therapy assess nares and determine need for appliance change or resting period.  1/2/2024 1118 by Estrada Richard RN  Outcome: Progressing  1/2/2024 0226 by Raquel Moore RN  Outcome: Progressing  Note: No new skin breakdown noted.  Has previous injury from home.     Problem: Safety - Adult  Goal: Free from fall injury  1/2/2024 1118 by Estrada Richard, RN  Outcome: Progressing  Flowsheets (Taken 1/2/2024 1118)  Free From Fall Injury:   Instruct family/caregiver on patient safety   Based on caregiver fall risk screen, instruct family/caregiver to ask for assistance with transferring infant if caregiver noted to 
  Problem: Discharge Planning  Goal: Discharge to home or other facility with appropriate resources  1/6/2024 0427 by Charito Garcia RN  Outcome: Progressing  Flowsheets (Taken 1/6/2024 0427)  Discharge to home or other facility with appropriate resources:   Identify barriers to discharge with patient and caregiver   Identify discharge learning needs (meds, wound care, etc)   Refer to discharge planning if patient needs post-hospital services based on physician order or complex needs related to functional status, cognitive ability or social support system   Arrange for needed discharge resources and transportation as appropriate   Arrange for interpreters to assist at discharge as needed     Problem: Skin/Tissue Integrity  Goal: Absence of new skin breakdown  Description: 1.  Monitor for areas of redness and/or skin breakdown  2.  Assess vascular access sites hourly  3.  Every 4-6 hours minimum:  Change oxygen saturation probe site  4.  Every 4-6 hours:  If on nasal continuous positive airway pressure, respiratory therapy assess nares and determine need for appliance change or resting period.  Outcome: Progressing     Problem: Safety - Adult  Goal: Free from fall injury  Outcome: Progressing  Flowsheets (Taken 1/6/2024 0427)  Free From Fall Injury: Instruct family/caregiver on patient safety     Problem: Chronic Conditions and Co-morbidities  Goal: Patient's chronic conditions and co-morbidity symptoms are monitored and maintained or improved  Outcome: Progressing  Flowsheets (Taken 1/6/2024 0427)  Care Plan - Patient's Chronic Conditions and Co-Morbidity Symptoms are Monitored and Maintained or Improved:   Monitor and assess patient's chronic conditions and comorbid symptoms for stability, deterioration, or improvement   Collaborate with multidisciplinary team to address chronic and comorbid conditions and prevent exacerbation or deterioration   Update acute care plan with appropriate goals if chronic or 
  Problem: Discharge Planning  Goal: Discharge to home or other facility with appropriate resources  1/7/2024 0006 by Charito Garcia RN  Outcome: Progressing  Flowsheets (Taken 1/7/2024 0006)  Discharge to home or other facility with appropriate resources:   Identify barriers to discharge with patient and caregiver   Identify discharge learning needs (meds, wound care, etc)   Refer to discharge planning if patient needs post-hospital services based on physician order or complex needs related to functional status, cognitive ability or social support system   Arrange for needed discharge resources and transportation as appropriate   Arrange for interpreters to assist at discharge as needed     Problem: Safety - Adult  Goal: Free from fall injury  1/7/2024 0006 by Chariot Garcia RN  Outcome: Progressing  Flowsheets (Taken 1/7/2024 0006)  Free From Fall Injury: Instruct family/caregiver on patient safety     Problem: Chronic Conditions and Co-morbidities  Goal: Patient's chronic conditions and co-morbidity symptoms are monitored and maintained or improved  1/7/2024 0006 by Charito Garcia RN  Outcome: Progressing  Flowsheets (Taken 1/7/2024 0006)  Care Plan - Patient's Chronic Conditions and Co-Morbidity Symptoms are Monitored and Maintained or Improved:   Monitor and assess patient's chronic conditions and comorbid symptoms for stability, deterioration, or improvement   Collaborate with multidisciplinary team to address chronic and comorbid conditions and prevent exacerbation or deterioration   Update acute care plan with appropriate goals if chronic or comorbid symptoms are exacerbated and prevent overall improvement and discharge     Problem: ABCDS Injury Assessment  Goal: Absence of physical injury  1/7/2024 0006 by Charito Gacria RN  Outcome: Progressing  Flowsheets (Taken 1/7/2024 0006)  Absence of Physical Injury: Implement safety measures based on patient assessment     Problem: Nutrition 
  Problem: Discharge Planning  Goal: Discharge to home or other facility with appropriate resources  1/8/2024 0436 by Charito Garcia RN  Outcome: Progressing  Flowsheets (Taken 1/8/2024 0436)  Discharge to home or other facility with appropriate resources:   Identify barriers to discharge with patient and caregiver   Identify discharge learning needs (meds, wound care, etc)   Refer to discharge planning if patient needs post-hospital services based on physician order or complex needs related to functional status, cognitive ability or social support system   Arrange for needed discharge resources and transportation as appropriate   Arrange for interpreters to assist at discharge as needed     Problem: Skin/Tissue Integrity  Goal: Absence of new skin breakdown  Description: 1.  Monitor for areas of redness and/or skin breakdown  2.  Assess vascular access sites hourly  3.  Every 4-6 hours minimum:  Change oxygen saturation probe site  4.  Every 4-6 hours:  If on nasal continuous positive airway pressure, respiratory therapy assess nares and determine need for appliance change or resting period.  1/8/2024 0436 by Charito Garcia RN  Outcome: Progressing     Problem: Safety - Adult  Goal: Free from fall injury  1/8/2024 0436 by Charito Garcia RN  Outcome: Progressing  Flowsheets (Taken 1/8/2024 0436)  Free From Fall Injury: Instruct family/caregiver on patient safety     Problem: Chronic Conditions and Co-morbidities  Goal: Patient's chronic conditions and co-morbidity symptoms are monitored and maintained or improved  1/8/2024 0436 by Charito Garcia RN  Outcome: Progressing  Flowsheets (Taken 1/8/2024 0436)  Care Plan - Patient's Chronic Conditions and Co-Morbidity Symptoms are Monitored and Maintained or Improved:   Monitor and assess patient's chronic conditions and comorbid symptoms for stability, deterioration, or improvement   Collaborate with multidisciplinary team to address chronic and 
  Problem: Discharge Planning  Goal: Discharge to home or other facility with appropriate resources  1/8/2024 1534 by Bisi Pagan RN  Outcome: Progressing  Flowsheets (Taken 1/8/2024 1534)  Discharge to home or other facility with appropriate resources:   Identify barriers to discharge with patient and caregiver   Arrange for needed discharge resources and transportation as appropriate   Identify discharge learning needs (meds, wound care, etc)     Problem: Skin/Tissue Integrity  Goal: Absence of new skin breakdown  Description: 1.  Monitor for areas of redness and/or skin breakdown  2.  Assess vascular access sites hourly  3.  Every 4-6 hours minimum:  Change oxygen saturation probe site  4.  Every 4-6 hours:  If on nasal continuous positive airway pressure, respiratory therapy assess nares and determine need for appliance change or resting period.  1/8/2024 1534 by Bisi Pagan RN  Outcome: Progressing     Problem: Safety - Adult  Goal: Free from fall injury  1/8/2024 1534 by Bisi Pagan RN  Outcome: Progressing     Problem: Chronic Conditions and Co-morbidities  Goal: Patient's chronic conditions and co-morbidity symptoms are monitored and maintained or improved  1/8/2024 1534 by Bisi Pagan RN  Outcome: Progressing     Problem: ABCDS Injury Assessment  Goal: Absence of physical injury  1/8/2024 1534 by Bisi Pagan RN  Outcome: Progressing     Problem: Nutrition Deficit:  Goal: Optimize nutritional status  1/8/2024 1534 by Bisi Pagan RN  Outcome: Progressing     Problem: Pain  Goal: Verbalizes/displays adequate comfort level or baseline comfort level  1/8/2024 1534 by Bisi Pagan RN  Outcome: Progressing  Flowsheets (Taken 1/8/2024 1534)  Verbalizes/displays adequate comfort level or baseline comfort level:   Encourage patient to monitor pain and request assistance   Assess pain using appropriate pain scale     Problem: Respiratory - Adult  Goal: Achieves optimal 
  Problem: Discharge Planning  Goal: Discharge to home or other facility with appropriate resources  Outcome: Adequate for Discharge     Problem: Skin/Tissue Integrity  Goal: Absence of new skin breakdown  Description: 1.  Monitor for areas of redness and/or skin breakdown  2.  Assess vascular access sites hourly  3.  Every 4-6 hours minimum:  Change oxygen saturation probe site  4.  Every 4-6 hours:  If on nasal continuous positive airway pressure, respiratory therapy assess nares and determine need for appliance change or resting period.  Outcome: Adequate for Discharge     Problem: Safety - Adult  Goal: Free from fall injury  Outcome: Adequate for Discharge     Problem: Chronic Conditions and Co-morbidities  Goal: Patient's chronic conditions and co-morbidity symptoms are monitored and maintained or improved  Outcome: Adequate for Discharge     Problem: ABCDS Injury Assessment  Goal: Absence of physical injury  Outcome: Adequate for Discharge     Problem: Nutrition Deficit:  Goal: Optimize nutritional status  Outcome: Adequate for Discharge     Problem: Pain  Goal: Verbalizes/displays adequate comfort level or baseline comfort level  Outcome: Adequate for Discharge     Problem: Respiratory - Adult  Goal: Achieves optimal ventilation and oxygenation  Outcome: Adequate for Discharge     Problem: Skin/Tissue Integrity - Adult  Goal: Incisions, wounds, or drain sites healing without S/S of infection  Outcome: Adequate for Discharge     Problem: Gastrointestinal - Adult  Goal: Maintains or returns to baseline bowel function  Outcome: Adequate for Discharge     Problem: Genitourinary - Adult  Goal: Absence of urinary retention  Outcome: Adequate for Discharge     Problem: Infection - Adult  Goal: Absence of infection at discharge  Outcome: Adequate for Discharge     Problem: Metabolic/Fluid and Electrolytes - Adult  Goal: Hemodynamic stability and optimal renal function maintained  Outcome: Adequate for Discharge     
  Problem: Discharge Planning  Goal: Discharge to home or other facility with appropriate resources  Outcome: Progressing     Problem: Skin/Tissue Integrity  Goal: Absence of new skin breakdown  Description: 1.  Monitor for areas of redness and/or skin breakdown  2.  Assess vascular access sites hourly  3.  Every 4-6 hours minimum:  Change oxygen saturation probe site  4.  Every 4-6 hours:  If on nasal continuous positive airway pressure, respiratory therapy assess nares and determine need for appliance change or resting period.  Outcome: Progressing     Problem: Safety - Adult  Goal: Free from fall injury  Outcome: Progressing     Problem: Chronic Conditions and Co-morbidities  Goal: Patient's chronic conditions and co-morbidity symptoms are monitored and maintained or improved  Outcome: Progressing     Problem: ABCDS Injury Assessment  Goal: Absence of physical injury  Outcome: Progressing     Problem: Nutrition Deficit:  Goal: Optimize nutritional status  Outcome: Progressing     Problem: Pain  Goal: Verbalizes/displays adequate comfort level or baseline comfort level  Outcome: Progressing     Problem: Respiratory - Adult  Goal: Achieves optimal ventilation and oxygenation  Outcome: Progressing     Problem: Skin/Tissue Integrity - Adult  Goal: Incisions, wounds, or drain sites healing without S/S of infection  Outcome: Progressing     Problem: Gastrointestinal - Adult  Goal: Maintains or returns to baseline bowel function  Outcome: Progressing     Problem: Genitourinary - Adult  Goal: Absence of urinary retention  Outcome: Progressing     Problem: Infection - Adult  Goal: Absence of infection at discharge  Outcome: Progressing     Problem: Metabolic/Fluid and Electrolytes - Adult  Goal: Hemodynamic stability and optimal renal function maintained  Outcome: Progressing   Care plan reviewed with patient .  Patient  verbalize understanding of the plan of care and contribute to goal setting.     
  Problem: Discharge Planning  Goal: Discharge to home or other facility with appropriate resources  Outcome: Progressing     Problem: Skin/Tissue Integrity  Goal: Absence of new skin breakdown  Description: 1.  Monitor for areas of redness and/or skin breakdown  2.  Assess vascular access sites hourly  3.  Every 4-6 hours minimum:  Change oxygen saturation probe site  4.  Every 4-6 hours:  If on nasal continuous positive airway pressure, respiratory therapy assess nares and determine need for appliance change or resting period.  Outcome: Progressing     Problem: Safety - Adult  Goal: Free from fall injury  Outcome: Progressing     Problem: Chronic Conditions and Co-morbidities  Goal: Patient's chronic conditions and co-morbidity symptoms are monitored and maintained or improved  Outcome: Progressing     Problem: ABCDS Injury Assessment  Goal: Absence of physical injury  Outcome: Progressing     Problem: Nutrition Deficit:  Goal: Optimize nutritional status  Outcome: Progressing     Problem: Pain  Goal: Verbalizes/displays adequate comfort level or baseline comfort level  Outcome: Progressing     Problem: Respiratory - Adult  Goal: Achieves optimal ventilation and oxygenation  Outcome: Progressing     Problem: Skin/Tissue Integrity - Adult  Goal: Incisions, wounds, or drain sites healing without S/S of infection  Outcome: Progressing  Flowsheets (Taken 1/9/2024 2026 by Milagros Hobbs, RN)  Incisions, Wounds, or Drain Sites Healing Without Sign and Symptoms of Infection: TWICE DAILY: Assess and document skin integrity     Problem: Gastrointestinal - Adult  Goal: Maintains or returns to baseline bowel function  Outcome: Progressing  Flowsheets (Taken 1/9/2024 1949 by Milagros Hobbs, RN)  Maintains or returns to baseline bowel function:   Assess bowel function   Encourage oral fluids to ensure adequate hydration   Encourage mobilization and activity     Problem: Genitourinary - Adult  Goal: Absence of urinary 
  Problem: Discharge Planning  Goal: Discharge to home or other facility with appropriate resources  Outcome: Progressing  Flowsheets (Taken 1/1/2024 0815)  Discharge to home or other facility with appropriate resources:   Identify barriers to discharge with patient and caregiver   Arrange for needed discharge resources and transportation as appropriate   Identify discharge learning needs (meds, wound care, etc)     Problem: Skin/Tissue Integrity  Goal: Absence of new skin breakdown  Description: 1.  Monitor for areas of redness and/or skin breakdown  2.  Assess vascular access sites hourly  3.  Every 4-6 hours minimum:  Change oxygen saturation probe site  4.  Every 4-6 hours:  If on nasal continuous positive airway pressure, respiratory therapy assess nares and determine need for appliance change or resting period.  Outcome: Progressing     Problem: Safety - Adult  Goal: Free from fall injury  Outcome: Progressing  Flowsheets (Taken 1/1/2024 1148)  Free From Fall Injury: Instruct family/caregiver on patient safety     Problem: Chronic Conditions and Co-morbidities  Goal: Patient's chronic conditions and co-morbidity symptoms are monitored and maintained or improved  Outcome: Progressing  Flowsheets (Taken 1/1/2024 0815)  Care Plan - Patient's Chronic Conditions and Co-Morbidity Symptoms are Monitored and Maintained or Improved:   Monitor and assess patient's chronic conditions and comorbid symptoms for stability, deterioration, or improvement   Collaborate with multidisciplinary team to address chronic and comorbid conditions and prevent exacerbation or deterioration     Problem: ABCDS Injury Assessment  Goal: Absence of physical injury  Outcome: Progressing  Flowsheets (Taken 1/1/2024 1148)  Absence of Physical Injury: Implement safety measures based on patient assessment     
  Problem: Discharge Planning  Goal: Discharge to home or other facility with appropriate resources  Outcome: Progressing  Flowsheets (Taken 1/1/2024 2030)  Discharge to home or other facility with appropriate resources:   Identify barriers to discharge with patient and caregiver   Arrange for needed discharge resources and transportation as appropriate   Identify discharge learning needs (meds, wound care, etc)   Refer to discharge planning if patient needs post-hospital services based on physician order or complex needs related to functional status, cognitive ability or social support system     Problem: Skin/Tissue Integrity  Goal: Absence of new skin breakdown  Description: 1.  Monitor for areas of redness and/or skin breakdown  2.  Assess vascular access sites hourly  3.  Every 4-6 hours minimum:  Change oxygen saturation probe site  4.  Every 4-6 hours:  If on nasal continuous positive airway pressure, respiratory therapy assess nares and determine need for appliance change or resting period.  Outcome: Progressing  Note: No new skin breakdown noted.  Has previous injury from home.     Problem: Safety - Adult  Goal: Free from fall injury  Outcome: Progressing  Flowsheets (Taken 1/2/2024 0226)  Free From Fall Injury:   Instruct family/caregiver on patient safety   Based on caregiver fall risk screen, instruct family/caregiver to ask for assistance with transferring infant if caregiver noted to have fall risk factors     Problem: ABCDS Injury Assessment  Goal: Absence of physical injury  Outcome: Progressing  Flowsheets (Taken 1/1/2024 1148 by Estrada Richard, RN)  Absence of Physical Injury: Implement safety measures based on patient assessment   Care plan reviewed with patient.  Patient verbalize understanding of the plan of care and contribute to goal setting.     
  Problem: Discharge Planning  Goal: Discharge to home or other facility with appropriate resources  Outcome: Progressing  Flowsheets (Taken 1/4/2024 0831)  Discharge to home or other facility with appropriate resources:   Identify barriers to discharge with patient and caregiver   Arrange for needed discharge resources and transportation as appropriate     Problem: Safety - Adult  Goal: Free from fall injury  Outcome: Progressing  Flowsheets (Taken 1/4/2024 0831)  Free From Fall Injury: Instruct family/caregiver on patient safety     Problem: ABCDS Injury Assessment  Goal: Absence of physical injury  Outcome: Progressing  Flowsheets (Taken 1/4/2024 0831)  Absence of Physical Injury: Implement safety measures based on patient assessment     Problem: Pain  Goal: Verbalizes/displays adequate comfort level or baseline comfort level  Outcome: Progressing  Flowsheets (Taken 1/4/2024 0831)  Verbalizes/displays adequate comfort level or baseline comfort level:   Encourage patient to monitor pain and request assistance   Assess pain using appropriate pain scale   Administer analgesics based on type and severity of pain and evaluate response   Implement non-pharmacological measures as appropriate and evaluate response     Problem: Respiratory - Adult  Goal: Achieves optimal ventilation and oxygenation  Outcome: Progressing  Flowsheets (Taken 1/4/2024 0831)  Achieves optimal ventilation and oxygenation:   Assess for changes in respiratory status   Position to facilitate oxygenation and minimize respiratory effort   Encourage broncho-pulmonary hygiene including cough, deep breathe, incentive spirometry  Note: Patient educated on how to use incentive spirometer. Patient verbalized understanding and demonstrated proper use. Emphasized importance and usage of device, with coughing and deep breathing every 1 hours.           Problem: Skin/Tissue Integrity - Adult  Goal: Incisions, wounds, or drain sites healing without S/S of 
  Problem: Discharge Planning  Goal: Discharge to home or other facility with appropriate resources  Outcome: Progressing  Flowsheets (Taken 1/5/2024 0547)  Discharge to home or other facility with appropriate resources:   Identify barriers to discharge with patient and caregiver   Identify discharge learning needs (meds, wound care, etc)  Problem: Gastrointestinal - Adult  Goal: Maintains or returns to baseline bowel function  Outcome: Progressing  Flowsheets (Taken 1/5/2024 0547)  Maintains or returns to baseline bowel function:   Assess bowel function   Administer IV fluids as ordered to ensure adequate hydration        Problem: Safety - Adult  Goal: Free from fall injury  Outcome: Progressing  Flowsheets (Taken 1/5/2024 0547)  Free From Fall Injury: Instruct family/caregiver on patient safety     Problem: Chronic Conditions and Co-morbidities  Goal: Patient's chronic conditions and co-morbidity symptoms are monitored and maintained or improved  Outcome: Progressing  Flowsheets (Taken 1/5/2024 0547)  Care Plan - Patient's Chronic Conditions and Co-Morbidity Symptoms are Monitored and Maintained or Improved:   Monitor and assess patient's chronic conditions and comorbid symptoms for stability, deterioration, or improvement   Collaborate with multidisciplinary team to address chronic and comorbid conditions and prevent exacerbation or deterioration     Problem: Nutrition Deficit:  Goal: Optimize nutritional status  Outcome: Progressing  Flowsheets (Taken 1/5/2024 0547)  Nutrient intake appropriate for improving, restoring, or maintaining nutritional needs:   Assess nutritional status and recommend course of action   Monitor oral intake, labs, and treatment plans     Problem: Respiratory - Adult  Goal: Achieves optimal ventilation and oxygenation  Outcome: Progressing  Flowsheets (Taken 1/5/2024 0547)  Achieves optimal ventilation and oxygenation:   Assess for changes in respiratory status   Assess for changes in 
  Problem: Discharge Planning  Goal: Discharge to home or other facility with appropriate resources  Outcome: Progressing  Flowsheets (Taken 1/7/2024 1444)  Discharge to home or other facility with appropriate resources: Identify barriers to discharge with patient and caregiver     Problem: Skin/Tissue Integrity  Goal: Absence of new skin breakdown  Description: 1.  Monitor for areas of redness and/or skin breakdown  2.  Assess vascular access sites hourly  3.  Every 4-6 hours minimum:  Change oxygen saturation probe site  4.  Every 4-6 hours:  If on nasal continuous positive airway pressure, respiratory therapy assess nares and determine need for appliance change or resting period.  Outcome: Progressing  Note: Ongoing assessment & interventions provided throughout shift.  Skin assessments provided.  Encouraging/assisting patient to turn as needed.      Problem: Safety - Adult  Goal: Free from fall injury  Outcome: Progressing  Flowsheets (Taken 1/7/2024 1444)  Free From Fall Injury: Instruct family/caregiver on patient safety     Problem: Chronic Conditions and Co-morbidities  Goal: Patient's chronic conditions and co-morbidity symptoms are monitored and maintained or improved  Outcome: Progressing  Flowsheets (Taken 1/7/2024 1444)  Care Plan - Patient's Chronic Conditions and Co-Morbidity Symptoms are Monitored and Maintained or Improved: Monitor and assess patient's chronic conditions and comorbid symptoms for stability, deterioration, or improvement     Problem: ABCDS Injury Assessment  Goal: Absence of physical injury  Outcome: Progressing  Flowsheets (Taken 1/7/2024 1444)  Absence of Physical Injury: Implement safety measures based on patient assessment     Problem: Nutrition Deficit:  Goal: Optimize nutritional status  Outcome: Progressing  Flowsheets (Taken 1/7/2024 1444)  Nutrient intake appropriate for improving, restoring, or maintaining nutritional needs: Assess nutritional status and recommend course of 
  Problem: Discharge Planning  Goal: Discharge to home or other facility with appropriate resources  Outcome: Progressing  Flowsheets (Taken 12/31/2023 0800)  Discharge to home or other facility with appropriate resources:   Identify barriers to discharge with patient and caregiver   Arrange for needed discharge resources and transportation as appropriate   Identify discharge learning needs (meds, wound care, etc)     Problem: Skin/Tissue Integrity  Goal: Absence of new skin breakdown  Description: 1.  Monitor for areas of redness and/or skin breakdown  2.  Assess vascular access sites hourly  3.  Every 4-6 hours minimum:  Change oxygen saturation probe site  4.  Every 4-6 hours:  If on nasal continuous positive airway pressure, respiratory therapy assess nares and determine need for appliance change or resting period.  Outcome: Progressing     Problem: Safety - Adult  Goal: Free from fall injury  Outcome: Progressing  Flowsheets (Taken 12/31/2023 1458)  Free From Fall Injury: Instruct family/caregiver on patient safety     Problem: Chronic Conditions and Co-morbidities  Goal: Patient's chronic conditions and co-morbidity symptoms are monitored and maintained or improved  Outcome: Progressing  Flowsheets (Taken 12/31/2023 0800)  Care Plan - Patient's Chronic Conditions and Co-Morbidity Symptoms are Monitored and Maintained or Improved:   Monitor and assess patient's chronic conditions and comorbid symptoms for stability, deterioration, or improvement   Collaborate with multidisciplinary team to address chronic and comorbid conditions and prevent exacerbation or deterioration     Problem: ABCDS Injury Assessment  Goal: Absence of physical injury  Outcome: Progressing     
Consult received.  Please see SW note dated 1/5/2024.  
Patient advancing to discharge  
Patient educated on how to use incentive spirometer. Patient verbalized understanding and demonstrated proper use. Emphasized importance and usage of device, with coughing and deep breathing every 4 hours while awake.        
Patient educated on how to use incentive spirometer. Patient verbalized understanding and demonstrated proper use. Emphasized importance and usage of device, with coughing and deep breathing every 4 hours while awake.        
Plan of care    Patient admitted overnight for evaluation of RLQ pain. Imaging concerning for perforated appendicitis. On examination, patient has abdominal guarding, rebound. No rigidity, abd remains soft. No lactic acidosis. Evaluated by general surgery, recommending treating conservatively for now. Currently on zosyn. Start heparin gtt in lieu of eliquis given high CHADS score.     Additionally has JOSE on CKD IV. Nephro on board. Started on IV fluids for prerenal JOSE. D5LR for hypoglycemia/JOSE.     Currently pending infectious workup for diarrheal illness.   
2000)  Verbalizes/displays adequate comfort level or baseline comfort level:   Assess pain using appropriate pain scale   Encourage patient to monitor pain and request assistance   Administer analgesics based on type and severity of pain and evaluate response  Note: Pt denies pain     Problem: Respiratory - Adult  Goal: Achieves optimal ventilation and oxygenation  1/9/2024 0939 by Nelsy Alfonso RN  Outcome: Progressing  1/9/2024 0939 by Nelsy Alfonso RN  Outcome: Progressing  1/9/2024 0511 by Milagros Hobbs RN  Outcome: Progressing     Problem: Skin/Tissue Integrity - Adult  Goal: Incisions, wounds, or drain sites healing without S/S of infection  1/9/2024 0939 by Nelsy Alfonso RN  Outcome: Progressing  1/9/2024 0939 by Nelsy Alfonso RN  Outcome: Progressing  1/9/2024 0511 by Milagros Hobbs RN  Outcome: Progressing     Problem: Gastrointestinal - Adult  Goal: Maintains or returns to baseline bowel function  1/9/2024 0939 by Nelsy Alfonso RN  Outcome: Progressing  1/9/2024 0939 by Nelsy Alfonso RN  Outcome: Progressing  1/9/2024 0511 by Milagros Hobbs RN  Outcome: Progressing     Problem: Genitourinary - Adult  Goal: Absence of urinary retention  1/9/2024 0939 by Nelsy Alfonso RN  Outcome: Progressing  1/9/2024 0939 by Nelsy Alfonso RN  Outcome: Progressing  Flowsheets (Taken 1/8/2024 2000 by Milagros Hobbs RN)  Absence of urinary retention:   Assess patient’s ability to void and empty bladder   Monitor intake/output and perform bladder scan as needed     Problem: Infection - Adult  Goal: Absence of infection at discharge  1/9/2024 0939 by Nelsy Alfonso RN  Outcome: Progressing  1/9/2024 0939 by Nelsy Alfonso RN  Outcome: Progressing  1/9/2024 0511 by Milagros Hobbs RN  Outcome: Progressing  Flowsheets (Taken 1/8/2024 0511)  Absence of infection at discharge:   Monitor all insertion sites i.e., indwelling lines, tubes and drains   Monitor lab/diagnostic results     Problem: 
assistance   Administer analgesics based on type and severity of pain and evaluate response  Note: Pt denies pain  1/8/2024 1534 by Bisi Pagan RN  Outcome: Progressing  Flowsheets (Taken 1/8/2024 1534)  Verbalizes/displays adequate comfort level or baseline comfort level:   Encourage patient to monitor pain and request assistance   Assess pain using appropriate pain scale     Problem: Respiratory - Adult  Goal: Achieves optimal ventilation and oxygenation  1/9/2024 0511 by Milagros Hobbs RN  Outcome: Progressing  1/8/2024 1534 by Bisi Pagan RN  Outcome: Progressing  Flowsheets (Taken 1/8/2024 1534)  Achieves optimal ventilation and oxygenation:   Assess for changes in respiratory status   Assess for changes in mentation and behavior   Position to facilitate oxygenation and minimize respiratory effort   Encourage broncho-pulmonary hygiene including cough, deep breathe, incentive spirometry  Note: Incentive spirometer every 4 hours, with encouragement from nursing.      Problem: Skin/Tissue Integrity - Adult  Goal: Incisions, wounds, or drain sites healing without S/S of infection  1/9/2024 0511 by Milagros Hobbs RN  Outcome: Progressing  1/8/2024 1534 by Bisi Pagan RN  Outcome: Progressing  Flowsheets (Taken 1/8/2024 1534)  Incisions, Wounds, or Drain Sites Healing Without Sign and Symptoms of Infection: ADMISSION and DAILY: Assess and document risk factors for pressure ulcer development     Problem: Gastrointestinal - Adult  Goal: Maintains or returns to baseline bowel function  1/9/2024 0511 by Milagros Hobbs RN  Outcome: Progressing  1/8/2024 1534 by Bisi Pagan RN  Outcome: Progressing  Flowsheets (Taken 1/8/2024 1534)  Maintains or returns to baseline bowel function:   Assess bowel function   Encourage oral fluids to ensure adequate hydration   Administer ordered medications as needed   Encourage mobilization and activity     Problem: Genitourinary - Adult  Goal: Absence of

## 2024-01-16 NOTE — CARE COORDINATION
1/10/24, 11:42 AM EST    DISCHARGE PLANNING EVALUATION    Left message at [facility] regarding referral.  Requested return call.    1:49 PM  Vasile from [facility] left message, they so not have bed.    Left voicemail message for Fern at [facility] regarding referral for [facility].  Requested return call.    Spoke with patient daughter Henna, advised of above, she will talk with sister for next preferences [facility] does not have a bed.    2:55 PM    Referral made to [facility], spoke to Fern.    Patients daughter Henna called, would like [facility] and [facility] called for skilled bed.    Spoke with Paul from [facility], they are not in Network.    Spoke with Julissa at [facility], they are not in network but have been successful with one time auths.  Referral made. Faxed clinicals.    Left Fern from [facility] message asking to put a hold on the referral.    3:43 PM  Updated daughter Henna.    
1/11/24, 4:06 PM EST    DISCHARGE PLANNING EVALUATION    Spoke with Julissa at [facility], they will accept patient.  Starting precert today.      Spoke with patient, informed [facility] nursing accepted and starting precert.    Left daughter message informing of accepted at SNF and precert started.  
1/12/24, 3:41 PM EST    DISCHARGE PLANNING EVALUATION    Received call from Julissa from [facility], precert approved, good through 1/17.  If discharging over the weekend, she would liked called at [phone].  
1/16/24, 10:00 AM EST    Patient goals/plan/ treatment preferences discussed by  and .  Patient goals/plan/ treatment preferences reviewed with patient/ family.  Patient/ family verbalize understanding of discharge plan and are in agreement with goal/plan/treatment preferences.  Understanding was demonstrated using the teach back method.  AVS provided by RN at time of discharge, which includes all necessary medical information pertaining to the patients current course of illness, treatment, post-discharge goals of care, and treatment preferences.     Services At/After Discharge: Skilled Nursing Facility (SNF), Aide services, In ambulance, Nursing service, OT, and PT       IMM Letter  IMM Letter given to Patient/Family/Significant other/Guardian/POA/by:: Katalina ABURTO CM  IMM Letter date given:: 01/16/24  IMM Letter time given:: 0942     Patient discharged to [facility], skilled medicare bed.  Julissa at [facility] informed of discharge and 3:00 transport. Faxed AVS and MAR, RN aware to call report.  Spoke with patient daughter Henna, informed of discharge and 3:00 transport.  Spoke wih patient informed of 3:00 transport.   
1/3/24, 2:58 PM EST    DISCHARGE ON GOING EVALUATION    [facility] day: 4  Location: -32/032-A Reason for admit: Acute kidney injury (HCC) [N17.9]   Procedure:   12/30 CT at outside hospital: A tubular structure within the right lower quadrant containing enteric contrast and locules of air. A single locule of free air within the right lower quadrant on image 95 series 2. Extensive inflammatory stranding and edema within the right lower quadrant. Favor perforated appendicitis,   recommend surgical evaluation. Developing phlegmon or abscess within the right lower quadrant not completely excluded without the use of IV contrast. 2. Majority of the mid and distal small bowel is distended measuring up to 3.5 cm in transverse diameter and containing intraluminal fluid. These dilated loops of small bowel extend to the terminal ileum. Favor reactive   small bowel ileus given inflammatory process within the right lower quadrant. 3. Question mucosal thickening involving the stomach pylorus and proximal  duodenal could represent gastritis.4. Advanced mucosal thickening of the urinary bladder with some stranding and induration surrounding the bladder. Recommend correlating with urinary analysis to exclude cystitis.5. Small volume free fluid within the dependent pelvis which is abnormal in a male patient and likely reactive given bowel findings. 6. A small pericardial effusion measuring up to 1.3 cm ventrally  12/31 KUB: Dilated small bowel loops with distal bowel gas. Findings may be secondary to paralytic ileus or partial obstruction.   1/1 CT A/P WO: Extensive thickening and inflammatory changes focused in the right lower quadrant involving the cecum and adjacent bowel loops. The appendix cannot be definitively visualized. There is questionable free air on series #2 image #73. The overall appearance of the abdomen has worsened since the previous CT scan. 2. Markedly dilated and fluid-filled small bowel. 
1/4/24, 2:03 PM EST    DISCHARGE ON GOING EVALUATION    [facility] day: 5  Location: -32/032-A Reason for admit: Acute kidney injury (HCC) [N17.9]   Procedure:   12/30 CT at outside hospital: A tubular structure within the right lower quadrant containing enteric contrast and locules of air. A single locule of free air within the right lower quadrant on image 95 series 2. Extensive inflammatory stranding and edema within the right lower quadrant. Favor perforated appendicitis,   recommend surgical evaluation. Developing phlegmon or abscess within the right lower quadrant not completely excluded without the use of IV contrast. 2. Majority of the mid and distal small bowel is distended measuring up to 3.5 cm in transverse diameter and containing intraluminal fluid. These dilated loops of small bowel extend to the terminal ileum. Favor reactive   small bowel ileus given inflammatory process within the right lower quadrant. 3. Question mucosal thickening involving the stomach pylorus and proximal  duodenal could represent gastritis.4. Advanced mucosal thickening of the urinary bladder with some stranding and induration surrounding the bladder. Recommend correlating with urinary analysis to exclude cystitis.5. Small volume free fluid within the dependent pelvis which is abnormal in a male patient and likely reactive given bowel findings. 6. A small pericardial effusion measuring up to 1.3 cm ventrally  12/31 KUB: Dilated small bowel loops with distal bowel gas. Findings may be secondary to paralytic ileus or partial obstruction.   1/1 CT A/P WO: Extensive thickening and inflammatory changes focused in the right lower quadrant involving the cecum and adjacent bowel loops. The appendix cannot be definitively visualized. There is questionable free air on series #2 image #73. The overall appearance of the abdomen has worsened since the previous CT scan. 2. Markedly dilated and fluid-filled small bowel. 
1/8/24, 1:50 PM EST    DISCHARGE PLANNING EVALUATION    Spoke with Phi this morning about going to an ECF for rehab.  He told SW that he will not go anywhere for rehab.  He told SW that he is going home.  Reminded him that he is not moving well.  He told SW that he will go live with his son if he can't do it at home.  Asked if his son knows about his plan.  He told SW that they have not spoken about it.  Made him aware he should let his son know about his plan.  Received a call from patients daughter Henna.  She told SW that the patient can't go home at discharge.  Told her that he can't be forced to go to an ECF even if it is unsafe for him to return home.  SW asked her to have family encourage him to go somewhere.  If he is agreeable SW will help find a facility.  Gave her SW number to call if they are able to make him agreeable to ECF.    
1/9/24, 2:02 PM EST    DISCHARGE PLANNING EVALUATION    Spoke with patient regarding discharge plan.  Patient is agreeable to SNF, only wants [facility]. Advised [facility] is not in network but waiting for a return call from facility to confirm. Discussed other options, advised [facility] is on network and [facility].  He was not not happy with either of those.  Advised will return when I hear from [facility].      Spoke with Laila from [facility], they are not in network at this time.  Currently in negotiations with Mis[facility].     Spoke with daughter Henna regarding above.  Advised [facility] is out of network.  He did not want [facility] or [facility].  Gave Henna list of in network facilities over the phone.  She will talk with her mother who is calling patient later. Post-acute (PAC) provider list was provided to patient. Patient was informed of their freedom to choose PAC provider. Discussed and offered to show the patient the relevant PAC Providers quality and resource use measures on Medicare Compare web site via computer based on patient's goals of care and treatment preferences. Questions regarding selection process were answered. Advised Henna ECF list is in patient room. Henna will call and leave message on phone with preferences.    Spoke with patient advised [facility] is out of network.  Reviewed in network choices.  Encouraged him to talk with family.  Will stop back tomorrow to discuss preferences. Post-acute (PAC) provider list was provided to patient. Patient was informed of their freedom to choose PAC provider. Discussed and offered to show the patient the relevant PAC Providers quality and resource use measures on Medicare Compare web site via computer based on patient's goals of care and treatment preferences. Questions regarding selection process were answered.      
1/9/24, 7:27 AM EST    DISCHARGE BARRIERS        Patient transferred to 8A-12. Report given to unit SW, Maria Teresa, regarding discharge plan for this patient.      
Case Management Assessment  Initial Evaluation    Date/Time of Evaluation: 1/2/2024 2:04 PM  Assessment Completed by: Josee Marinelli RN    If patient is discharged prior to next notation, then this note serves as note for discharge by case management.    Patient Name: Phi Sanz                   YOB: 1950  Diagnosis: Acute kidney injury (HCC) [N17.9]                   Date / Time: 12/30/2023  4:02 AM  Location: [address] [place] [address]     Patient Admission Status: Inpatient   Readmission Risk Low 0-14, Mod 15-19), High > 20: Readmission Risk Score: 20.2    Current PCP: Kaushal Ramsay MD  PCP verified by CM? Yes    Chart Reviewed: Yes      History Provided by: Patient  Patient Orientation: Alert and Oriented    Patient Cognition: Alert    Hospitalization in the last 30 days (Readmission):  No    If yes, Readmission Assessment in CM Navigator will be completed.    Advance Directives:      Code Status: Full Code   Patient's Primary Decision Maker is: Patient Declined (Legal Next of Kin Remains as Decision Maker)    Primary Decision Maker: Fern Sanz - Spouse - [phone]    Discharge Planning:    Patient lives with: Spouse/Significant Other Type of Home: House  Primary Care Giver: Self  Patient Support Systems include: Children, Spouse/Significant Other   Current Financial resources: Medicare  Current community resources: None  Current services prior to admission: Durable Medical Equipment            Current DME: Walker (Rollator)            Type of Home Care services:  None    ADLS  Prior functional level: Independent in ADLs/IADLs  Current functional level: Assistance with the following:, Bathing, Dressing, Cooking, Housework, Mobility, Shopping    Family can provide assistance at DC: No  Would you like Case Management to discuss the discharge plan with any other family members/significant others, and if so, who? No  Plans to Return to Present Housing: Yes  Other Identified Issues/Barriers to 
DISCHARGE PLANNING EVALUATION  1/5/24, 2:57 PM EST    Reason for Referral: Needs SNF  Mental Status: Alert and oriented to person, place and time.    Decision Making: Independent with decision making.    Family/Social/Home Environment: Lives at home with his spouse.  Family is supportive.   Current Services including food security, transportation and housekeeping: No current services.    Current Equipment:rollator, high toilet, tub transfer bench.   Payment Source:[facility] Medicare and Medicaid  Concerns or Barriers to Discharge: None  Post-acute (PAC) provider list was provided to patient. Patient was informed of their freedom to choose PAC provider. Discussed and offered to show the patient the relevant PAC Providers quality and resource use measures on Medicare Compare web site via computer based on patient's goals of care and treatment preferences. Questions regarding selection process were answered.      Teach Back Method used with Phi regarding care plan and discharge planning.   Patient verbalized understanding of the plan of care and contribute to goal setting.       Patient goals, treatment preferences and discharge plan: Phi plans to return home at discharge.  He is aware that the team is recommending ECF at discharge.  He told SW that he will see how he does over the weekend and make a decision after that.  He would be agreeable to [facility].      Electronically signed by WINDY Betts on 1/5/2024 at 2:57 PM           
Spoke with Anneliese  with [employer] [phone]. Patient receives services with them.  He has emergency button for at home. Incontinent supplies and meals with them. Does qualify for aid services but they do not currently have an aid for him. If any questions she said to call  
This finding has worsened since the previous exam and could be related to a bowel obstruction although this may be reactive given the extensive inflammatory changes in the right lower quadrant. 3. Stable pericardial effusion.   1/2 OR with Dr. Noble: Abdominal exploration. 2.  Drainage of pelvic abscess.3.  Ileocecectomy  1/3 XR Abdomen: Pneumoperitoneum present. 2. OGT/NGT tip body of stomach  1/4 Echo: Normal left ventricular systolic function with a visually estimated EF of 65%. Left ventricle size is normal. Normal wall thickness. Normal wall motion. Grade I diastolic dysfunction with normal LAP.  1/12 CXR: 1. Normal heart size. Tiny bilateral pleural effusions, best seen posteriorly.   2. Mild retrocardiac atelectasis/pneumonia.   Barriers to Discharge: Hospitalist, Nephrology, General surgery, and Palliative care following. Continue with therapy. Trending H&H every 8 hours, transfused yesterday. Holding Eliquis. Retacrit weekly. Consult to GI today for possibel GI bleed.   PCP: Kaushal Ramsay MD  Readmission Risk Score: 21.4%  Patient Goals/Plan/Treatment Preferences: From home with wife.   SW working on placement at [facility]. See SW notes.

## 2024-01-16 NOTE — DISCHARGE SUMMARY
Hospital with diarrhea and mild abdominal pain.  States he has had waxing and waning diarrhea for at least 4 years and alternates between laxatives and Imodium.  Was too weak to walk and wife brought him to the emergency department.  Denied fever, chills, nausea, vomiting, chest pain, shortness of breath, urinary symptoms.     Was seen at [facility], mildly hypotensive with SBP in the 90s, given 2 L fluid bolus at that time.  Mild white count 14.5.  Noted significant elevated creatinine greater than 6, baseline creatinine approximately 3.  Follows with Dr. Richardson outpatient.  Given the significantly worsening kidney function, decided to transfer patient for further management.     HPI     Labs Available on Admission:  BMP: Sodium 135, potassium 4.4, chloride 102, bicarb 18, AG 19, BUN 63, creatinine 6.2  Lactic acid 1.1  CBC: WBC 14.95, hemoglobin 10.5, hematocrit 31.3, platelets 140     1/10--> hemodynamically stable, Tmax 99.1, satting 90% on room air; RN states patient having diarrhea; nephrology note reviewed     1/11--> hemodynamically stable, afebrile, on room air, hemoglobin dropped to 6.5 today so was transfused with 1 unit packed red blood cells, I saw this patient with nephrology this morning     1/12--> hemodynamically stable, afebrile, on room air, hemoglobin improved after transfusion yesterday; on exam patient has some wheezes and fine crackles so ordered a chest x-ray and DuoNebs     1/13--> hemodynamically stable, nurse called yesterday afternoon stating the patient had a medium amount of dark red/black tarry stool, GI was consulted; hemoglobin noted to be 8.2 last evening     1/14--> hemodynamically stable, afebrile and on room air; hemoglobin down to 7.1, GI was okay with resuming Eliquis yesterday however back on hold     1/15--> hemodynamically stable, afebrile and on room air, patient's hemoglobin improved to 8.0 however off anticoagulation, creatinine at 3.0; GI awaiting records from

## 2024-07-21 ENCOUNTER — HOSPITAL ENCOUNTER (INPATIENT)
Age: 74
LOS: 8 days | Discharge: NURSING, REHAB OR LONG-TERM CARE | DRG: 389 | End: 2024-07-29
Payer: MEDICARE

## 2024-07-21 PROBLEM — K56.609 SBO (SMALL BOWEL OBSTRUCTION) (HCC): Status: ACTIVE | Noted: 2024-07-21

## 2024-07-21 LAB
ANION GAP SERPL CALC-SCNC: 16 MEQ/L (ref 8–16)
BASOPHILS ABSOLUTE: 0 THOU/MM3 (ref 0–0.1)
BASOPHILS NFR BLD AUTO: 0.2 %
BUN SERPL-MCNC: 50 MG/DL (ref 7–22)
CALCIUM SERPL-MCNC: 9.4 MG/DL (ref 8.5–10.5)
CHLORIDE SERPL-SCNC: 96 MEQ/L (ref 98–111)
CO2 SERPL-SCNC: 24 MEQ/L (ref 23–33)
CREAT SERPL-MCNC: 5.1 MG/DL (ref 0.4–1.2)
DEPRECATED RDW RBC AUTO: 45.2 FL (ref 35–45)
EOSINOPHIL NFR BLD AUTO: 0.1 %
EOSINOPHILS ABSOLUTE: 0 THOU/MM3 (ref 0–0.4)
ERYTHROCYTE [DISTWIDTH] IN BLOOD BY AUTOMATED COUNT: 14.2 % (ref 11.5–14.5)
GFR SERPL CREATININE-BSD FRML MDRD: 11 ML/MIN/1.73M2
GLUCOSE BLD STRIP.AUTO-MCNC: 168 MG/DL (ref 70–108)
GLUCOSE SERPL-MCNC: 186 MG/DL (ref 70–108)
HCT VFR BLD AUTO: 37.5 % (ref 42–52)
HGB BLD-MCNC: 12.6 GM/DL (ref 14–18)
IMM GRANULOCYTES # BLD AUTO: 0.03 THOU/MM3 (ref 0–0.07)
IMM GRANULOCYTES NFR BLD AUTO: 0.4 %
LACTATE SERPL-SCNC: 1.2 MMOL/L (ref 0.5–2)
LYMPHOCYTES ABSOLUTE: 0.6 THOU/MM3 (ref 1–4.8)
LYMPHOCYTES NFR BLD AUTO: 7.2 %
MCH RBC QN AUTO: 29.9 PG (ref 26–33)
MCHC RBC AUTO-ENTMCNC: 33.6 GM/DL (ref 32.2–35.5)
MCV RBC AUTO: 89.1 FL (ref 80–94)
MONOCYTES ABSOLUTE: 0.7 THOU/MM3 (ref 0.4–1.3)
MONOCYTES NFR BLD AUTO: 8 %
NEUTROPHILS ABSOLUTE: 7.1 THOU/MM3 (ref 1.8–7.7)
NEUTROPHILS NFR BLD AUTO: 84.1 %
NRBC BLD AUTO-RTO: 0 /100 WBC
PLATELET # BLD AUTO: 199 THOU/MM3 (ref 130–400)
PMV BLD AUTO: 10.9 FL (ref 9.4–12.4)
POTASSIUM SERPL-SCNC: 4.5 MEQ/L (ref 3.5–5.2)
RBC # BLD AUTO: 4.21 MILL/MM3 (ref 4.7–6.1)
SODIUM SERPL-SCNC: 136 MEQ/L (ref 135–145)
WBC # BLD AUTO: 8.5 THOU/MM3 (ref 4.8–10.8)

## 2024-07-21 PROCEDURE — 6360000002 HC RX W HCPCS: Performed by: PHYSICIAN ASSISTANT

## 2024-07-21 PROCEDURE — 1200000003 HC TELEMETRY R&B

## 2024-07-21 PROCEDURE — 36415 COLL VENOUS BLD VENIPUNCTURE: CPT

## 2024-07-21 PROCEDURE — 2580000003 HC RX 258: Performed by: PHYSICIAN ASSISTANT

## 2024-07-21 PROCEDURE — 85025 COMPLETE CBC W/AUTO DIFF WBC: CPT

## 2024-07-21 PROCEDURE — 80048 BASIC METABOLIC PNL TOTAL CA: CPT

## 2024-07-21 PROCEDURE — 82948 REAGENT STRIP/BLOOD GLUCOSE: CPT

## 2024-07-21 PROCEDURE — 99223 1ST HOSP IP/OBS HIGH 75: CPT | Performed by: PHYSICIAN ASSISTANT

## 2024-07-21 PROCEDURE — 83605 ASSAY OF LACTIC ACID: CPT

## 2024-07-21 RX ORDER — SODIUM CHLORIDE 0.9 % (FLUSH) 0.9 %
10 SYRINGE (ML) INJECTION PRN
Status: DISCONTINUED | OUTPATIENT
Start: 2024-07-21 | End: 2024-07-29 | Stop reason: HOSPADM

## 2024-07-21 RX ORDER — ENOXAPARIN SODIUM 100 MG/ML
40 INJECTION SUBCUTANEOUS DAILY
Status: DISCONTINUED | OUTPATIENT
Start: 2024-07-21 | End: 2024-07-22

## 2024-07-21 RX ORDER — DEXTROSE MONOHYDRATE 100 MG/ML
INJECTION, SOLUTION INTRAVENOUS CONTINUOUS PRN
Status: DISCONTINUED | OUTPATIENT
Start: 2024-07-21 | End: 2024-07-29 | Stop reason: HOSPADM

## 2024-07-21 RX ORDER — GLUCAGON 1 MG/ML
1 KIT INJECTION PRN
Status: DISCONTINUED | OUTPATIENT
Start: 2024-07-21 | End: 2024-07-29 | Stop reason: HOSPADM

## 2024-07-21 RX ORDER — LOVASTATIN 40 MG/1
40 TABLET ORAL NIGHTLY
Status: ON HOLD | COMMUNITY
End: 2024-07-21

## 2024-07-21 RX ORDER — INSULIN LISPRO 100 [IU]/ML
0-4 INJECTION, SOLUTION INTRAVENOUS; SUBCUTANEOUS NIGHTLY
Status: DISCONTINUED | OUTPATIENT
Start: 2024-07-21 | End: 2024-07-29 | Stop reason: HOSPADM

## 2024-07-21 RX ORDER — GABAPENTIN 400 MG/1
800 CAPSULE ORAL 2 TIMES DAILY
COMMUNITY

## 2024-07-21 RX ORDER — INSULIN LISPRO 100 [IU]/ML
0-4 INJECTION, SOLUTION INTRAVENOUS; SUBCUTANEOUS
Status: DISCONTINUED | OUTPATIENT
Start: 2024-07-22 | End: 2024-07-29 | Stop reason: HOSPADM

## 2024-07-21 RX ORDER — ACETAMINOPHEN 325 MG/1
650 TABLET ORAL EVERY 6 HOURS PRN
Status: DISCONTINUED | OUTPATIENT
Start: 2024-07-21 | End: 2024-07-29 | Stop reason: HOSPADM

## 2024-07-21 RX ORDER — SODIUM CHLORIDE 0.9 % (FLUSH) 0.9 %
10 SYRINGE (ML) INJECTION EVERY 12 HOURS SCHEDULED
Status: DISCONTINUED | OUTPATIENT
Start: 2024-07-21 | End: 2024-07-29 | Stop reason: HOSPADM

## 2024-07-21 RX ORDER — ACETAMINOPHEN 650 MG/1
650 SUPPOSITORY RECTAL EVERY 6 HOURS PRN
Status: DISCONTINUED | OUTPATIENT
Start: 2024-07-21 | End: 2024-07-29 | Stop reason: HOSPADM

## 2024-07-21 RX ORDER — FERROUS SULFATE 325(65) MG
325 TABLET ORAL
COMMUNITY

## 2024-07-21 RX ORDER — ONDANSETRON 4 MG/1
4 TABLET, ORALLY DISINTEGRATING ORAL EVERY 8 HOURS PRN
Status: DISCONTINUED | OUTPATIENT
Start: 2024-07-21 | End: 2024-07-29 | Stop reason: HOSPADM

## 2024-07-21 RX ORDER — SODIUM CHLORIDE 9 MG/ML
INJECTION, SOLUTION INTRAVENOUS PRN
Status: DISCONTINUED | OUTPATIENT
Start: 2024-07-21 | End: 2024-07-29 | Stop reason: HOSPADM

## 2024-07-21 RX ORDER — POLYETHYLENE GLYCOL 3350 17 G/17G
17 POWDER, FOR SOLUTION ORAL DAILY PRN
Status: DISCONTINUED | OUTPATIENT
Start: 2024-07-21 | End: 2024-07-29 | Stop reason: HOSPADM

## 2024-07-21 RX ORDER — LOPERAMIDE HYDROCHLORIDE 2 MG/1
2 CAPSULE ORAL EVERY 6 HOURS PRN
COMMUNITY
Start: 2024-05-13

## 2024-07-21 RX ORDER — ONDANSETRON 2 MG/ML
4 INJECTION INTRAMUSCULAR; INTRAVENOUS EVERY 6 HOURS PRN
Status: DISCONTINUED | OUTPATIENT
Start: 2024-07-21 | End: 2024-07-29 | Stop reason: HOSPADM

## 2024-07-21 RX ADMIN — SODIUM CHLORIDE, PRESERVATIVE FREE 10 ML: 5 INJECTION INTRAVENOUS at 22:00

## 2024-07-21 RX ADMIN — HYDROMORPHONE HYDROCHLORIDE 0.5 MG: 1 INJECTION, SOLUTION INTRAMUSCULAR; INTRAVENOUS; SUBCUTANEOUS at 21:55

## 2024-07-21 ASSESSMENT — PAIN SCALES - GENERAL
PAINLEVEL_OUTOF10: 9
PAINLEVEL_OUTOF10: 8

## 2024-07-21 ASSESSMENT — PAIN DESCRIPTION - LOCATION: LOCATION: ABDOMEN

## 2024-07-22 ENCOUNTER — APPOINTMENT (OUTPATIENT)
Dept: GENERAL RADIOLOGY | Age: 74
DRG: 389 | End: 2024-07-22
Payer: MEDICARE

## 2024-07-22 PROBLEM — I10 PRIMARY HYPERTENSION: Status: ACTIVE | Noted: 2024-07-22

## 2024-07-22 PROBLEM — N40.0 BENIGN PROSTATIC HYPERPLASIA: Status: ACTIVE | Noted: 2024-07-22

## 2024-07-22 PROBLEM — D63.1 ANEMIA OF CHRONIC RENAL FAILURE, STAGE 4 (SEVERE) (HCC): Status: ACTIVE | Noted: 2024-07-22

## 2024-07-22 PROBLEM — N18.4 ANEMIA OF CHRONIC RENAL FAILURE, STAGE 4 (SEVERE) (HCC): Status: ACTIVE | Noted: 2024-07-22

## 2024-07-22 PROBLEM — Z66 DNR (DO NOT RESUSCITATE): Status: ACTIVE | Noted: 2024-07-22

## 2024-07-22 LAB
GLUCOSE BLD STRIP.AUTO-MCNC: 103 MG/DL (ref 70–108)
GLUCOSE BLD STRIP.AUTO-MCNC: 140 MG/DL (ref 70–108)
GLUCOSE BLD STRIP.AUTO-MCNC: 192 MG/DL (ref 70–108)
GLUCOSE BLD STRIP.AUTO-MCNC: 255 MG/DL (ref 70–108)

## 2024-07-22 PROCEDURE — 99222 1ST HOSP IP/OBS MODERATE 55: CPT | Performed by: INTERNAL MEDICINE

## 2024-07-22 PROCEDURE — 6370000000 HC RX 637 (ALT 250 FOR IP): Performed by: PHYSICIAN ASSISTANT

## 2024-07-22 PROCEDURE — 1200000000 HC SEMI PRIVATE

## 2024-07-22 PROCEDURE — 82948 REAGENT STRIP/BLOOD GLUCOSE: CPT

## 2024-07-22 PROCEDURE — 74018 RADEX ABDOMEN 1 VIEW: CPT

## 2024-07-22 PROCEDURE — 1200000003 HC TELEMETRY R&B

## 2024-07-22 PROCEDURE — 2580000003 HC RX 258: Performed by: PHYSICIAN ASSISTANT

## 2024-07-22 PROCEDURE — 6360000002 HC RX W HCPCS: Performed by: PHYSICIAN ASSISTANT

## 2024-07-22 PROCEDURE — 99232 SBSQ HOSP IP/OBS MODERATE 35: CPT | Performed by: INTERNAL MEDICINE

## 2024-07-22 PROCEDURE — 2580000003 HC RX 258: Performed by: INTERNAL MEDICINE

## 2024-07-22 RX ORDER — ATORVASTATIN CALCIUM 10 MG/1
10 TABLET, FILM COATED ORAL NIGHTLY
Status: DISCONTINUED | OUTPATIENT
Start: 2024-07-22 | End: 2024-07-29 | Stop reason: HOSPADM

## 2024-07-22 RX ORDER — SODIUM CHLORIDE 9 MG/ML
INJECTION, SOLUTION INTRAVENOUS CONTINUOUS
Status: DISCONTINUED | OUTPATIENT
Start: 2024-07-22 | End: 2024-07-29 | Stop reason: HOSPADM

## 2024-07-22 RX ORDER — FERROUS SULFATE 325(65) MG
325 TABLET ORAL
Status: DISCONTINUED | OUTPATIENT
Start: 2024-07-22 | End: 2024-07-29 | Stop reason: HOSPADM

## 2024-07-22 RX ORDER — TAMSULOSIN HYDROCHLORIDE 0.4 MG/1
0.4 CAPSULE ORAL DAILY
Status: DISCONTINUED | OUTPATIENT
Start: 2024-07-22 | End: 2024-07-29 | Stop reason: HOSPADM

## 2024-07-22 RX ORDER — INSULIN GLARGINE 100 [IU]/ML
15 INJECTION, SOLUTION SUBCUTANEOUS 2 TIMES DAILY
Status: DISCONTINUED | OUTPATIENT
Start: 2024-07-22 | End: 2024-07-22

## 2024-07-22 RX ORDER — SODIUM CHLORIDE, SODIUM LACTATE, POTASSIUM CHLORIDE, AND CALCIUM CHLORIDE .6; .31; .03; .02 G/100ML; G/100ML; G/100ML; G/100ML
1000 INJECTION, SOLUTION INTRAVENOUS ONCE
Status: COMPLETED | OUTPATIENT
Start: 2024-07-22 | End: 2024-07-22

## 2024-07-22 RX ORDER — QUETIAPINE FUMARATE 100 MG/1
100 TABLET, FILM COATED ORAL 2 TIMES DAILY
Status: DISCONTINUED | OUTPATIENT
Start: 2024-07-22 | End: 2024-07-29 | Stop reason: HOSPADM

## 2024-07-22 RX ORDER — AMLODIPINE BESYLATE 5 MG/1
5 TABLET ORAL DAILY
Status: DISCONTINUED | OUTPATIENT
Start: 2024-07-22 | End: 2024-07-24

## 2024-07-22 RX ORDER — INSULIN GLARGINE 100 [IU]/ML
15 INJECTION, SOLUTION SUBCUTANEOUS NIGHTLY
Status: DISCONTINUED | OUTPATIENT
Start: 2024-07-22 | End: 2024-07-29 | Stop reason: HOSPADM

## 2024-07-22 RX ORDER — LEVOTHYROXINE SODIUM 0.12 MG/1
125 TABLET ORAL DAILY
Status: DISCONTINUED | OUTPATIENT
Start: 2024-07-22 | End: 2024-07-29 | Stop reason: HOSPADM

## 2024-07-22 RX ORDER — BUSPIRONE HYDROCHLORIDE 10 MG/1
10 TABLET ORAL 2 TIMES DAILY
Status: DISCONTINUED | OUTPATIENT
Start: 2024-07-22 | End: 2024-07-29 | Stop reason: HOSPADM

## 2024-07-22 RX ORDER — GABAPENTIN 300 MG/1
300 CAPSULE ORAL 2 TIMES DAILY
Status: DISCONTINUED | OUTPATIENT
Start: 2024-07-22 | End: 2024-07-22

## 2024-07-22 RX ORDER — INSULIN GLARGINE 100 [IU]/ML
20 INJECTION, SOLUTION SUBCUTANEOUS NIGHTLY
Status: DISCONTINUED | OUTPATIENT
Start: 2024-07-22 | End: 2024-07-22

## 2024-07-22 RX ORDER — SODIUM BICARBONATE 650 MG/1
1300 TABLET ORAL 2 TIMES DAILY
Status: DISCONTINUED | OUTPATIENT
Start: 2024-07-22 | End: 2024-07-29 | Stop reason: HOSPADM

## 2024-07-22 RX ORDER — PANTOPRAZOLE SODIUM 40 MG/1
40 TABLET, DELAYED RELEASE ORAL
Status: DISCONTINUED | OUTPATIENT
Start: 2024-07-22 | End: 2024-07-29 | Stop reason: HOSPADM

## 2024-07-22 RX ORDER — LANOLIN ALCOHOL/MO/W.PET/CERES
400 CREAM (GRAM) TOPICAL 2 TIMES DAILY
Status: DISCONTINUED | OUTPATIENT
Start: 2024-07-22 | End: 2024-07-29 | Stop reason: HOSPADM

## 2024-07-22 RX ORDER — AMIODARONE HYDROCHLORIDE 200 MG/1
100 TABLET ORAL DAILY
Status: DISCONTINUED | OUTPATIENT
Start: 2024-07-22 | End: 2024-07-29 | Stop reason: HOSPADM

## 2024-07-22 RX ORDER — HEPARIN SODIUM 5000 [USP'U]/ML
5000 INJECTION, SOLUTION INTRAVENOUS; SUBCUTANEOUS EVERY 8 HOURS SCHEDULED
Status: DISCONTINUED | OUTPATIENT
Start: 2024-07-22 | End: 2024-07-22

## 2024-07-22 RX ADMIN — Medication 400 MG: at 01:52

## 2024-07-22 RX ADMIN — APIXABAN 5 MG: 5 TABLET, FILM COATED ORAL at 12:04

## 2024-07-22 RX ADMIN — APIXABAN 5 MG: 5 TABLET, FILM COATED ORAL at 01:52

## 2024-07-22 RX ADMIN — SODIUM BICARBONATE 1300 MG: 650 TABLET ORAL at 12:03

## 2024-07-22 RX ADMIN — PANTOPRAZOLE SODIUM 40 MG: 40 TABLET, DELAYED RELEASE ORAL at 12:04

## 2024-07-22 RX ADMIN — AMLODIPINE BESYLATE 5 MG: 5 TABLET ORAL at 12:03

## 2024-07-22 RX ADMIN — BUSPIRONE HYDROCHLORIDE 10 MG: 10 TABLET ORAL at 12:03

## 2024-07-22 RX ADMIN — INSULIN LISPRO 2 UNITS: 100 INJECTION, SOLUTION INTRAVENOUS; SUBCUTANEOUS at 08:53

## 2024-07-22 RX ADMIN — BUSPIRONE HYDROCHLORIDE 10 MG: 10 TABLET ORAL at 01:52

## 2024-07-22 RX ADMIN — QUETIAPINE FUMARATE 100 MG: 100 TABLET ORAL at 01:52

## 2024-07-22 RX ADMIN — AMIODARONE HYDROCHLORIDE 100 MG: 200 TABLET ORAL at 12:02

## 2024-07-22 RX ADMIN — SODIUM CHLORIDE, POTASSIUM CHLORIDE, SODIUM LACTATE AND CALCIUM CHLORIDE 1000 ML: 600; 310; 30; 20 INJECTION, SOLUTION INTRAVENOUS at 04:30

## 2024-07-22 RX ADMIN — ATORVASTATIN CALCIUM 10 MG: 10 TABLET, FILM COATED ORAL at 21:32

## 2024-07-22 RX ADMIN — QUETIAPINE FUMARATE 100 MG: 100 TABLET ORAL at 12:03

## 2024-07-22 RX ADMIN — Medication 400 MG: at 12:03

## 2024-07-22 RX ADMIN — Medication 400 MG: at 21:32

## 2024-07-22 RX ADMIN — BUSPIRONE HYDROCHLORIDE 10 MG: 10 TABLET ORAL at 21:32

## 2024-07-22 RX ADMIN — HYDROMORPHONE HYDROCHLORIDE 0.5 MG: 1 INJECTION, SOLUTION INTRAMUSCULAR; INTRAVENOUS; SUBCUTANEOUS at 02:03

## 2024-07-22 RX ADMIN — TAMSULOSIN HYDROCHLORIDE 0.4 MG: 0.4 CAPSULE ORAL at 12:03

## 2024-07-22 RX ADMIN — LEVOTHYROXINE SODIUM 125 MCG: 0.12 TABLET ORAL at 12:05

## 2024-07-22 RX ADMIN — SODIUM BICARBONATE 1300 MG: 650 TABLET ORAL at 01:52

## 2024-07-22 RX ADMIN — QUETIAPINE FUMARATE 100 MG: 100 TABLET ORAL at 21:32

## 2024-07-22 RX ADMIN — APIXABAN 5 MG: 5 TABLET, FILM COATED ORAL at 21:32

## 2024-07-22 RX ADMIN — FERROUS SULFATE TAB 325 MG (65 MG ELEMENTAL FE) 325 MG: 325 (65 FE) TAB at 12:06

## 2024-07-22 RX ADMIN — SODIUM CHLORIDE: 9 INJECTION, SOLUTION INTRAVENOUS at 17:08

## 2024-07-22 RX ADMIN — SODIUM BICARBONATE 1300 MG: 650 TABLET ORAL at 21:32

## 2024-07-22 ASSESSMENT — PAIN SCALES - GENERAL
PAINLEVEL_OUTOF10: 7
PAINLEVEL_OUTOF10: 6

## 2024-07-22 ASSESSMENT — PAIN DESCRIPTION - LOCATION: LOCATION: ABDOMEN

## 2024-07-22 NOTE — CONSULTS
[facility]    [address], [place] [address]  [phone]     Hospital Consult  7/22/2024 4:17 PM    Pt Name:    Phi Sanz  MRN:     [id number]   [id number]  YOB: 1950  Admit Date:    7/21/2024  8:20 PM  Primary Care Physician:  Kaushal Ramsay MD        Reason for Consult:  JOSE/CKD    History:   The patient is a 74 y.o. male seen in renal consult for JOSE/CKD IV. He has past hx of uncontrolled DM with diabetic nephropathy, prostate CA, HTN, Afib, CVA, hypothyroidism, severe AS, Afib, and as below. He was here in December with acute cholecystitis and had worsening renal function/ATN. Never needed HD, creatinine came down to 3.2 at discharge, but he never followed up with me after and said he hasn't had any renal labs done since then. He presents this admission as a transfer from [facility] for abdominal pain, nausea, vomiting and concern for small bowel obstruction. Has NG tube placed and surgery consulted. Serum creatinine was 5.1.  he reports no edema or SOB.  Has been having nausea, vomiting,abd pain,  poor historian.    Past Medical History:  Past Medical History:   Diagnosis Date    A-fib (HCC)     Chronic pain syndrome     CVA (cerebral vascular accident) (HCC)     Diabetes mellitus (HCC)     Heart murmur     Hyperlipidemia        Past Surgical History:  Past Surgical History:   Procedure Laterality Date    CATARACT REMOVAL Bilateral     CIRCUMCISION      HEMICOLECTOMY N/A 1/2/2024    Right Colon Resection, ileocecectomy, appendectomy performed by Chaitanya Noble MD at [facility] OR    SHOULDER SURGERY      TONSILLECTOMY      UPPER GASTROINTESTINAL ENDOSCOPY         Family History:  Family History   Problem Relation Age of Onset    Heart Disease Mother     Heart Attack Mother        Social History:  Social History     Socioeconomic History    Marital status:      Spouse name: Not on file    Number of children: Not on file    Years of education: Not

## 2024-07-22 NOTE — CARE COORDINATION
7/22/24, 10:50 AM EDT  Discharge Planning Evaluation  Social work consult received, patient from [facility].    Patient/Family preference is to return to [facility].    The patient's current payor source at the facility is medicaid.   Medicare skilled days available: yes  Medicare does the patient have a three midnight qualifying stay? N/a  Insurance precert:  yes if needs skilled  Message left with admissions at the facility.  Patient bed hold: yes  Anticipated transport plan: unknown at this time  Patient's Healthcare Decision Maker: Named in Scanned ACP Document    Readmission Risk Low 0-14, Mod 15-19), High > 20: Readmission Risk Score: 16.2    Current PCP: Kaushal Ramsay MD  PCP verified by CM? Yes    Patient Orientation: Alert and Oriented    Patient Cognition: Alert  History Provided by: Patient    Advance Directives:      Code Status: DNR-CCA   Patient's Primary Decision Maker is: Named in Scanned ACP Document    Primary Decision Maker: Fern Sanz - [facility] - [phone]     Discharge Planning:    Patient lives with: Other (Comment) (from [facility]) Type of Home: Skilled Nursing Facility  Primary Care Giver: Self  Patient Support Systems include: Family Members   Current Financial resources: None  Current community resources: ECF/Home Care  Current services prior to admission: Skilled Nursing Facility            Current DME:              Type of Home Care services:  None    ADLS  Prior functional level: Assistance with the following:, Housework, Shopping, Mobility, Cooking, Dressing  Current functional level: Assistance with the following:, Housework, Shopping, Mobility, Cooking, Dressing    Family can provide assistance at DC: No  Would you like Case Management to discuss the discharge plan with any other family members/significant others, and if so, who? No  Plans to Return to Present Housing: Yes  Other Identified Issues/Barriers to RETURNING to current housing: n/a  Potential Assistance needed at

## 2024-07-22 NOTE — CARE COORDINATION
Case Management Assessment Initial Evaluation    Date/Time of Evaluation: 2024 7:25 AM  Assessment Completed by: Barrington Valderrama RN    If patient is discharged prior to next notation, then this note serves as note for discharge by case management.    Patient Name: Phi Sanz                   YOB: 1950  Diagnosis: SBO (small bowel obstruction) (HCC) [K56.609]                   Date / Time: 2024  8:20 PM  Location: [address] [place] [address]     Patient Admission Status: Inpatient   Readmission Risk Low 0-14, Mod 15-19), High > 20: Readmission Risk Score: 16.2    Current PCP: Kaushal Ramsay MD    Additional Case Management Notes: Admitted from [facility]. Creat 5.1. LR infusion, pain control. NG to [facility]. General surgery and nephrology consults pending.     Procedures: none    Imagin/22 KUB: s high-grade distal small-bowel obstruction with bowel loops in the upper abdomen measuring 4 cm in diameter, with gastric distention.    Patient Goals/Plan/Treatment Preferences: from [facility]. SW consulted.

## 2024-07-22 NOTE — DISCHARGE INSTR - COC
requires Intermediate Nursing Care for greater 30 days.     Update Admission H&P: No change in H&P    PHYSICIAN SIGNATURE:  Electronically signed by Brianne Sharpe PA-C on 7/29/24 at 10:24 AM EDT

## 2024-07-23 LAB
ALBUMIN SERPL BCG-MCNC: 2.9 G/DL (ref 3.5–5.1)
ALP SERPL-CCNC: 88 U/L (ref 38–126)
ALT SERPL W/O P-5'-P-CCNC: 14 U/L (ref 11–66)
ANION GAP SERPL CALC-SCNC: 14 MEQ/L (ref 8–16)
AST SERPL-CCNC: 16 U/L (ref 5–40)
BASOPHILS ABSOLUTE: 0 THOU/MM3 (ref 0–0.1)
BASOPHILS NFR BLD AUTO: 1.5 %
BILIRUB SERPL-MCNC: 0.3 MG/DL (ref 0.3–1.2)
BUN SERPL-MCNC: 70 MG/DL (ref 7–22)
CALCIUM SERPL-MCNC: 8.1 MG/DL (ref 8.5–10.5)
CHLORIDE SERPL-SCNC: 102 MEQ/L (ref 98–111)
CO2 SERPL-SCNC: 26 MEQ/L (ref 23–33)
CREAT SERPL-MCNC: 6.3 MG/DL (ref 0.4–1.2)
DEPRECATED RDW RBC AUTO: 50.4 FL (ref 35–45)
EOSINOPHIL NFR BLD AUTO: 2.6 %
EOSINOPHILS ABSOLUTE: 0.1 THOU/MM3 (ref 0–0.4)
ERYTHROCYTE [DISTWIDTH] IN BLOOD BY AUTOMATED COUNT: 14.8 % (ref 11.5–14.5)
GFR SERPL CREATININE-BSD FRML MDRD: 9 ML/MIN/1.73M2
GLUCOSE BLD STRIP.AUTO-MCNC: 66 MG/DL (ref 70–108)
GLUCOSE BLD STRIP.AUTO-MCNC: 67 MG/DL (ref 70–108)
GLUCOSE BLD STRIP.AUTO-MCNC: 84 MG/DL (ref 70–108)
GLUCOSE BLD STRIP.AUTO-MCNC: 98 MG/DL (ref 70–108)
GLUCOSE BLD STRIP.AUTO-MCNC: 98 MG/DL (ref 70–108)
GLUCOSE BLD STRIP.AUTO-MCNC: 99 MG/DL (ref 70–108)
GLUCOSE SERPL-MCNC: 67 MG/DL (ref 70–108)
HCT VFR BLD AUTO: 31.3 % (ref 42–52)
HGB BLD-MCNC: 10.2 GM/DL (ref 14–18)
IMM GRANULOCYTES # BLD AUTO: 0.05 THOU/MM3 (ref 0–0.07)
IMM GRANULOCYTES NFR BLD AUTO: 1.8 %
LYMPHOCYTES ABSOLUTE: 0.7 THOU/MM3 (ref 1–4.8)
LYMPHOCYTES NFR BLD AUTO: 26.8 %
MCH RBC QN AUTO: 30.6 PG (ref 26–33)
MCHC RBC AUTO-ENTMCNC: 32.6 GM/DL (ref 32.2–35.5)
MCV RBC AUTO: 94 FL (ref 80–94)
MONOCYTES ABSOLUTE: 0.3 THOU/MM3 (ref 0.4–1.3)
MONOCYTES NFR BLD AUTO: 12.5 %
NEUTROPHILS ABSOLUTE: 1.5 THOU/MM3 (ref 1.8–7.7)
NEUTROPHILS NFR BLD AUTO: 54.8 %
NRBC BLD AUTO-RTO: 0 /100 WBC
PLATELET # BLD AUTO: 145 THOU/MM3 (ref 130–400)
PMV BLD AUTO: 11.3 FL (ref 9.4–12.4)
POTASSIUM SERPL-SCNC: 4.5 MEQ/L (ref 3.5–5.2)
PROT SERPL-MCNC: 5.1 G/DL (ref 6.1–8)
RBC # BLD AUTO: 3.33 MILL/MM3 (ref 4.7–6.1)
REASON FOR REJECTION: NORMAL
REJECTED TEST: NORMAL
SODIUM SERPL-SCNC: 142 MEQ/L (ref 135–145)
WBC # BLD AUTO: 2.7 THOU/MM3 (ref 4.8–10.8)

## 2024-07-23 PROCEDURE — 99232 SBSQ HOSP IP/OBS MODERATE 35: CPT | Performed by: INTERNAL MEDICINE

## 2024-07-23 PROCEDURE — 1200000003 HC TELEMETRY R&B

## 2024-07-23 PROCEDURE — 2580000003 HC RX 258: Performed by: INTERNAL MEDICINE

## 2024-07-23 PROCEDURE — 1200000000 HC SEMI PRIVATE

## 2024-07-23 PROCEDURE — 85025 COMPLETE CBC W/AUTO DIFF WBC: CPT

## 2024-07-23 PROCEDURE — 2580000003 HC RX 258: Performed by: PHYSICIAN ASSISTANT

## 2024-07-23 PROCEDURE — 36415 COLL VENOUS BLD VENIPUNCTURE: CPT

## 2024-07-23 PROCEDURE — 82948 REAGENT STRIP/BLOOD GLUCOSE: CPT

## 2024-07-23 PROCEDURE — 6370000000 HC RX 637 (ALT 250 FOR IP): Performed by: PHYSICIAN ASSISTANT

## 2024-07-23 PROCEDURE — 80053 COMPREHEN METABOLIC PANEL: CPT

## 2024-07-23 RX ADMIN — AMIODARONE HYDROCHLORIDE 100 MG: 200 TABLET ORAL at 08:25

## 2024-07-23 RX ADMIN — AMLODIPINE BESYLATE 5 MG: 5 TABLET ORAL at 08:25

## 2024-07-23 RX ADMIN — Medication 400 MG: at 20:38

## 2024-07-23 RX ADMIN — SODIUM BICARBONATE 1300 MG: 650 TABLET ORAL at 08:21

## 2024-07-23 RX ADMIN — TAMSULOSIN HYDROCHLORIDE 0.4 MG: 0.4 CAPSULE ORAL at 08:21

## 2024-07-23 RX ADMIN — QUETIAPINE FUMARATE 100 MG: 100 TABLET ORAL at 08:21

## 2024-07-23 RX ADMIN — SODIUM CHLORIDE: 9 INJECTION, SOLUTION INTRAVENOUS at 20:40

## 2024-07-23 RX ADMIN — FERROUS SULFATE TAB 325 MG (65 MG ELEMENTAL FE) 325 MG: 325 (65 FE) TAB at 08:21

## 2024-07-23 RX ADMIN — PANTOPRAZOLE SODIUM 40 MG: 40 TABLET, DELAYED RELEASE ORAL at 08:21

## 2024-07-23 RX ADMIN — ATORVASTATIN CALCIUM 10 MG: 10 TABLET, FILM COATED ORAL at 20:38

## 2024-07-23 RX ADMIN — QUETIAPINE FUMARATE 100 MG: 100 TABLET ORAL at 20:38

## 2024-07-23 RX ADMIN — SODIUM BICARBONATE 1300 MG: 650 TABLET ORAL at 20:38

## 2024-07-23 RX ADMIN — Medication 400 MG: at 08:21

## 2024-07-23 RX ADMIN — BUSPIRONE HYDROCHLORIDE 10 MG: 10 TABLET ORAL at 08:21

## 2024-07-23 RX ADMIN — SODIUM CHLORIDE: 9 INJECTION, SOLUTION INTRAVENOUS at 08:21

## 2024-07-23 RX ADMIN — LEVOTHYROXINE SODIUM 125 MCG: 0.12 TABLET ORAL at 08:21

## 2024-07-23 RX ADMIN — DEXTROSE MONOHYDRATE 125 ML: 100 INJECTION, SOLUTION INTRAVENOUS at 11:32

## 2024-07-23 RX ADMIN — APIXABAN 5 MG: 5 TABLET, FILM COATED ORAL at 20:38

## 2024-07-23 RX ADMIN — BUSPIRONE HYDROCHLORIDE 10 MG: 10 TABLET ORAL at 20:38

## 2024-07-23 RX ADMIN — DEXTROSE MONOHYDRATE 125 ML: 100 INJECTION, SOLUTION INTRAVENOUS at 06:55

## 2024-07-23 RX ADMIN — APIXABAN 5 MG: 5 TABLET, FILM COATED ORAL at 08:21

## 2024-07-23 NOTE — CONSULTS
[address] [address]                              CONSULTATION      PATIENT NAME: NABIL GUALLPA              : 1950  MED REC NO: [id number]                       ROOM: [facility]  ACCOUNT NO: [id number]                       ADMIT DATE: 2024  PROVIDER: Chaitanya Noble MD      CONSULT DATE: 2024    REFERRING PHYSICIAN:  MARYLOU WHITNEY    CHIEF COMPLAINT:  Possible small bowel obstruction.    HISTORY OF PRESENT ILLNESS:  The patient is a 74-year-old male, very noncompliant with his medical care, who has chronic renal failure, does not really follow with the Renal Service as needed, but nonetheless had presented at the end of 2023 with a perforated appendicitis.  He had actually gone to [facility] at that time and was admitted at that time with a creatinine of 6.  He eventually was taken to surgery on , undergoing an abdominal exploration and drainage of a large pelvic abscess and an ileocecectomy.  The patient did not follow up in the office with me, was discharged actually on January 10 at that time and apparently he has been having onset of nausea, vomiting, abdominal pain over the last 2 or 3 days.  Again, he is very noncompliant even with giving a good history.  His wife and family were at the bedside today during this examination.  He has an NG in place, but Surgery has been consulted due to CT findings of a small bowel obstruction.    PAST MEDICAL HISTORY:  Positive for chronic renal disease, type 2 diabetes, hypothyroidism, hypertension, hyperlipidemia, chronic anemia, BPH.    PAST SURGICAL HISTORY:  Includes the ileocecectomy as mentioned above.  He has also had a cataract removal in the past.  Remote circumcision.  Remote tonsillectomy.  He has had orthopedic surgery on his shoulder.    FAMILY HISTORY:  Positive for coronary artery disease.    SOCIAL HISTORY:  The patient is .

## 2024-07-24 ENCOUNTER — APPOINTMENT (OUTPATIENT)
Dept: ULTRASOUND IMAGING | Age: 74
DRG: 389 | End: 2024-07-24
Payer: MEDICARE

## 2024-07-24 ENCOUNTER — APPOINTMENT (OUTPATIENT)
Dept: GENERAL RADIOLOGY | Age: 74
DRG: 389 | End: 2024-07-24
Payer: MEDICARE

## 2024-07-24 LAB
ALBUMIN SERPL BCG-MCNC: 2.9 G/DL (ref 3.5–5.1)
ALP SERPL-CCNC: 93 U/L (ref 38–126)
ALT SERPL W/O P-5'-P-CCNC: 13 U/L (ref 11–66)
ANION GAP SERPL CALC-SCNC: 14 MEQ/L (ref 8–16)
AST SERPL-CCNC: 14 U/L (ref 5–40)
BASOPHILS ABSOLUTE: 0 THOU/MM3 (ref 0–0.1)
BASOPHILS NFR BLD AUTO: 0.8 %
BILIRUB SERPL-MCNC: 0.4 MG/DL (ref 0.3–1.2)
BUN SERPL-MCNC: 71 MG/DL (ref 7–22)
CALCIUM SERPL-MCNC: 8.1 MG/DL (ref 8.5–10.5)
CHLORIDE SERPL-SCNC: 103 MEQ/L (ref 98–111)
CO2 SERPL-SCNC: 24 MEQ/L (ref 23–33)
CREAT SERPL-MCNC: 6.2 MG/DL (ref 0.4–1.2)
DEPRECATED RDW RBC AUTO: 49.9 FL (ref 35–45)
EOSINOPHIL NFR BLD AUTO: 2.4 %
EOSINOPHILS ABSOLUTE: 0.1 THOU/MM3 (ref 0–0.4)
ERYTHROCYTE [DISTWIDTH] IN BLOOD BY AUTOMATED COUNT: 14.5 % (ref 11.5–14.5)
GFR SERPL CREATININE-BSD FRML MDRD: 9 ML/MIN/1.73M2
GLUCOSE BLD STRIP.AUTO-MCNC: 116 MG/DL (ref 70–108)
GLUCOSE BLD STRIP.AUTO-MCNC: 124 MG/DL (ref 70–108)
GLUCOSE BLD STRIP.AUTO-MCNC: 153 MG/DL (ref 70–108)
GLUCOSE BLD STRIP.AUTO-MCNC: 222 MG/DL (ref 70–108)
GLUCOSE SERPL-MCNC: 98 MG/DL (ref 70–108)
HCT VFR BLD AUTO: 29.6 % (ref 42–52)
HGB BLD-MCNC: 9.2 GM/DL (ref 14–18)
IMM GRANULOCYTES # BLD AUTO: 0.01 THOU/MM3 (ref 0–0.07)
IMM GRANULOCYTES NFR BLD AUTO: 0.4 %
LYMPHOCYTES ABSOLUTE: 0.8 THOU/MM3 (ref 1–4.8)
LYMPHOCYTES NFR BLD AUTO: 31.5 %
MCH RBC QN AUTO: 29.8 PG (ref 26–33)
MCHC RBC AUTO-ENTMCNC: 31.1 GM/DL (ref 32.2–35.5)
MCV RBC AUTO: 95.8 FL (ref 80–94)
MONOCYTES ABSOLUTE: 0.4 THOU/MM3 (ref 0.4–1.3)
MONOCYTES NFR BLD AUTO: 15.7 %
NEUTROPHILS ABSOLUTE: 1.2 THOU/MM3 (ref 1.8–7.7)
NEUTROPHILS NFR BLD AUTO: 49.2 %
NRBC BLD AUTO-RTO: 0 /100 WBC
PLATELET # BLD AUTO: 149 THOU/MM3 (ref 130–400)
PLATELET BLD QL SMEAR: ADEQUATE
PMV BLD AUTO: 11.3 FL (ref 9.4–12.4)
POTASSIUM SERPL-SCNC: 4.4 MEQ/L (ref 3.5–5.2)
PROT SERPL-MCNC: 5.2 G/DL (ref 6.1–8)
RBC # BLD AUTO: 3.09 MILL/MM3 (ref 4.7–6.1)
SCAN OF BLOOD SMEAR: NORMAL
SODIUM SERPL-SCNC: 141 MEQ/L (ref 135–145)
WBC # BLD AUTO: 2.5 THOU/MM3 (ref 4.8–10.8)

## 2024-07-24 PROCEDURE — 99232 SBSQ HOSP IP/OBS MODERATE 35: CPT | Performed by: INTERNAL MEDICINE

## 2024-07-24 PROCEDURE — 2580000003 HC RX 258: Performed by: INTERNAL MEDICINE

## 2024-07-24 PROCEDURE — 6370000000 HC RX 637 (ALT 250 FOR IP): Performed by: INTERNAL MEDICINE

## 2024-07-24 PROCEDURE — 76770 US EXAM ABDO BACK WALL COMP: CPT

## 2024-07-24 PROCEDURE — 36415 COLL VENOUS BLD VENIPUNCTURE: CPT

## 2024-07-24 PROCEDURE — 85025 COMPLETE CBC W/AUTO DIFF WBC: CPT

## 2024-07-24 PROCEDURE — 1200000000 HC SEMI PRIVATE

## 2024-07-24 PROCEDURE — 74018 RADEX ABDOMEN 1 VIEW: CPT

## 2024-07-24 PROCEDURE — 80053 COMPREHEN METABOLIC PANEL: CPT

## 2024-07-24 PROCEDURE — 82948 REAGENT STRIP/BLOOD GLUCOSE: CPT

## 2024-07-24 PROCEDURE — 6370000000 HC RX 637 (ALT 250 FOR IP): Performed by: PHYSICIAN ASSISTANT

## 2024-07-24 RX ORDER — POLYVINYL ALCOHOL 14 MG/ML
1 SOLUTION/ DROPS OPHTHALMIC 3 TIMES DAILY
Status: DISCONTINUED | OUTPATIENT
Start: 2024-07-24 | End: 2024-07-29 | Stop reason: HOSPADM

## 2024-07-24 RX ORDER — CARBOXYMETHYLCELLULOSE SODIUM 5 MG/ML
1 SOLUTION/ DROPS OPHTHALMIC 3 TIMES DAILY
Status: DISCONTINUED | OUTPATIENT
Start: 2024-07-24 | End: 2024-07-24 | Stop reason: SDUPTHER

## 2024-07-24 RX ORDER — AMLODIPINE BESYLATE 2.5 MG/1
2.5 TABLET ORAL DAILY
Status: DISCONTINUED | OUTPATIENT
Start: 2024-07-25 | End: 2024-07-29

## 2024-07-24 RX ADMIN — QUETIAPINE FUMARATE 100 MG: 100 TABLET ORAL at 09:13

## 2024-07-24 RX ADMIN — APIXABAN 5 MG: 5 TABLET, FILM COATED ORAL at 09:13

## 2024-07-24 RX ADMIN — AMLODIPINE BESYLATE 5 MG: 5 TABLET ORAL at 09:13

## 2024-07-24 RX ADMIN — Medication 400 MG: at 09:13

## 2024-07-24 RX ADMIN — APIXABAN 5 MG: 5 TABLET, FILM COATED ORAL at 19:29

## 2024-07-24 RX ADMIN — LEVOTHYROXINE SODIUM 125 MCG: 0.12 TABLET ORAL at 05:05

## 2024-07-24 RX ADMIN — ACETAMINOPHEN 650 MG: 325 TABLET ORAL at 09:13

## 2024-07-24 RX ADMIN — SODIUM CHLORIDE: 9 INJECTION, SOLUTION INTRAVENOUS at 16:25

## 2024-07-24 RX ADMIN — BUSPIRONE HYDROCHLORIDE 10 MG: 10 TABLET ORAL at 09:12

## 2024-07-24 RX ADMIN — ATORVASTATIN CALCIUM 10 MG: 10 TABLET, FILM COATED ORAL at 19:30

## 2024-07-24 RX ADMIN — AMIODARONE HYDROCHLORIDE 100 MG: 200 TABLET ORAL at 09:15

## 2024-07-24 RX ADMIN — POLYVINYL ALCOHOL 1 DROP: 1.4 SOLUTION/ DROPS OPHTHALMIC at 19:30

## 2024-07-24 RX ADMIN — POLYVINYL ALCOHOL 1 DROP: 1.4 SOLUTION/ DROPS OPHTHALMIC at 17:20

## 2024-07-24 RX ADMIN — Medication 400 MG: at 19:30

## 2024-07-24 RX ADMIN — SODIUM CHLORIDE: 9 INJECTION, SOLUTION INTRAVENOUS at 06:32

## 2024-07-24 RX ADMIN — INSULIN GLARGINE 15 UNITS: 100 INJECTION, SOLUTION SUBCUTANEOUS at 20:28

## 2024-07-24 RX ADMIN — TAMSULOSIN HYDROCHLORIDE 0.4 MG: 0.4 CAPSULE ORAL at 09:12

## 2024-07-24 RX ADMIN — FERROUS SULFATE TAB 325 MG (65 MG ELEMENTAL FE) 325 MG: 325 (65 FE) TAB at 09:13

## 2024-07-24 RX ADMIN — QUETIAPINE FUMARATE 100 MG: 100 TABLET ORAL at 19:30

## 2024-07-24 RX ADMIN — BUSPIRONE HYDROCHLORIDE 10 MG: 10 TABLET ORAL at 19:29

## 2024-07-24 RX ADMIN — PANTOPRAZOLE SODIUM 40 MG: 40 TABLET, DELAYED RELEASE ORAL at 05:05

## 2024-07-24 RX ADMIN — SODIUM BICARBONATE 1300 MG: 650 TABLET ORAL at 19:29

## 2024-07-24 RX ADMIN — SODIUM BICARBONATE 1300 MG: 650 TABLET ORAL at 09:13

## 2024-07-24 ASSESSMENT — PAIN SCALES - GENERAL
PAINLEVEL_OUTOF10: 5
PAINLEVEL_OUTOF10: 0
PAINLEVEL_OUTOF10: 5

## 2024-07-24 ASSESSMENT — PAIN - FUNCTIONAL ASSESSMENT: PAIN_FUNCTIONAL_ASSESSMENT: ACTIVITIES ARE NOT PREVENTED

## 2024-07-24 ASSESSMENT — PAIN DESCRIPTION - PAIN TYPE: TYPE: CHRONIC PAIN

## 2024-07-24 ASSESSMENT — PAIN DESCRIPTION - LOCATION: LOCATION: OTHER (COMMENT)

## 2024-07-24 ASSESSMENT — PAIN DESCRIPTION - DESCRIPTORS: DESCRIPTORS: TINGLING;ACHING;DISCOMFORT

## 2024-07-24 NOTE — CARE COORDINATION
7/24/24, 7:33 AM EDT    DISCHARGE ON GOING EVALUATION    [facility] day: 3  Location: 6-27/027-A Reason for admit: SBO (small bowel obstruction) (HCC) [K56.609]     Procedures: No    Imaging since last note: 7-23-24 KUB    IMPRESSION:  Improving bowel loops.    Barriers to Discharge: Gen Surg consulted and has seen for possible SBO. Plan conservative tx. NG in place. IVF at 125/hr.     It is noted that later in day yesterday pt removed his NG. Bowel sounds were noted. Remained NPO.     Nephrology following for JOSE/CKD. Low urine output. Creat 6.3 yesterday.     Eliquis, hx PAF.     PCP: Kaushal Ramsay MD  Readmission Risk Score: 20.3    Patient Goals/Plan/Treatment Preferences: From [facility]. SW following.

## 2024-07-25 LAB
ANION GAP SERPL CALC-SCNC: 15 MEQ/L (ref 8–16)
BASOPHILS ABSOLUTE: 0 THOU/MM3 (ref 0–0.1)
BASOPHILS NFR BLD AUTO: 0.9 %
BUN SERPL-MCNC: 68 MG/DL (ref 7–22)
CALCIUM SERPL-MCNC: 7.8 MG/DL (ref 8.5–10.5)
CHLORIDE SERPL-SCNC: 99 MEQ/L (ref 98–111)
CO2 SERPL-SCNC: 21 MEQ/L (ref 23–33)
CREAT SERPL-MCNC: 5.5 MG/DL (ref 0.4–1.2)
DEPRECATED RDW RBC AUTO: 45.8 FL (ref 35–45)
EOSINOPHIL NFR BLD AUTO: 2.6 %
EOSINOPHILS ABSOLUTE: 0.1 THOU/MM3 (ref 0–0.4)
ERYTHROCYTE [DISTWIDTH] IN BLOOD BY AUTOMATED COUNT: 13.9 % (ref 11.5–14.5)
GFR SERPL CREATININE-BSD FRML MDRD: 10 ML/MIN/1.73M2
GLUCOSE BLD STRIP.AUTO-MCNC: 115 MG/DL (ref 70–108)
GLUCOSE BLD STRIP.AUTO-MCNC: 153 MG/DL (ref 70–108)
GLUCOSE BLD STRIP.AUTO-MCNC: 154 MG/DL (ref 70–108)
GLUCOSE BLD STRIP.AUTO-MCNC: 89 MG/DL (ref 70–108)
GLUCOSE SERPL-MCNC: 179 MG/DL (ref 70–108)
HCT VFR BLD AUTO: 27.4 % (ref 42–52)
HGB BLD-MCNC: 8.8 GM/DL (ref 14–18)
IMM GRANULOCYTES # BLD AUTO: 0 THOU/MM3 (ref 0–0.07)
IMM GRANULOCYTES NFR BLD AUTO: 0 %
LYMPHOCYTES ABSOLUTE: 0.7 THOU/MM3 (ref 1–4.8)
LYMPHOCYTES NFR BLD AUTO: 29.2 %
MCH RBC QN AUTO: 29.8 PG (ref 26–33)
MCHC RBC AUTO-ENTMCNC: 32.1 GM/DL (ref 32.2–35.5)
MCV RBC AUTO: 92.9 FL (ref 80–94)
MONOCYTES ABSOLUTE: 0.3 THOU/MM3 (ref 0.4–1.3)
MONOCYTES NFR BLD AUTO: 15 %
NEUTROPHILS ABSOLUTE: 1.2 THOU/MM3 (ref 1.8–7.7)
NEUTROPHILS NFR BLD AUTO: 52.3 %
NRBC BLD AUTO-RTO: 0 /100 WBC
PLATELET # BLD AUTO: 144 THOU/MM3 (ref 130–400)
PMV BLD AUTO: 11.1 FL (ref 9.4–12.4)
POTASSIUM SERPL-SCNC: 4.1 MEQ/L (ref 3.5–5.2)
RBC # BLD AUTO: 2.95 MILL/MM3 (ref 4.7–6.1)
SODIUM SERPL-SCNC: 135 MEQ/L (ref 135–145)
WBC # BLD AUTO: 2.3 THOU/MM3 (ref 4.8–10.8)

## 2024-07-25 PROCEDURE — 2580000003 HC RX 258: Performed by: INTERNAL MEDICINE

## 2024-07-25 PROCEDURE — 1200000000 HC SEMI PRIVATE

## 2024-07-25 PROCEDURE — 99232 SBSQ HOSP IP/OBS MODERATE 35: CPT | Performed by: INTERNAL MEDICINE

## 2024-07-25 PROCEDURE — 85025 COMPLETE CBC W/AUTO DIFF WBC: CPT

## 2024-07-25 PROCEDURE — 6370000000 HC RX 637 (ALT 250 FOR IP): Performed by: PHYSICIAN ASSISTANT

## 2024-07-25 PROCEDURE — 6370000000 HC RX 637 (ALT 250 FOR IP): Performed by: INTERNAL MEDICINE

## 2024-07-25 PROCEDURE — 80048 BASIC METABOLIC PNL TOTAL CA: CPT

## 2024-07-25 PROCEDURE — 82948 REAGENT STRIP/BLOOD GLUCOSE: CPT

## 2024-07-25 PROCEDURE — 36415 COLL VENOUS BLD VENIPUNCTURE: CPT

## 2024-07-25 PROCEDURE — 99233 SBSQ HOSP IP/OBS HIGH 50: CPT | Performed by: PHYSICIAN ASSISTANT

## 2024-07-25 RX ADMIN — ATORVASTATIN CALCIUM 10 MG: 10 TABLET, FILM COATED ORAL at 19:34

## 2024-07-25 RX ADMIN — PANTOPRAZOLE SODIUM 40 MG: 40 TABLET, DELAYED RELEASE ORAL at 05:06

## 2024-07-25 RX ADMIN — LEVOTHYROXINE SODIUM 125 MCG: 0.12 TABLET ORAL at 05:06

## 2024-07-25 RX ADMIN — Medication 400 MG: at 08:11

## 2024-07-25 RX ADMIN — POLYVINYL ALCOHOL 1 DROP: 1.4 SOLUTION/ DROPS OPHTHALMIC at 19:34

## 2024-07-25 RX ADMIN — TAMSULOSIN HYDROCHLORIDE 0.4 MG: 0.4 CAPSULE ORAL at 08:11

## 2024-07-25 RX ADMIN — SODIUM BICARBONATE 1300 MG: 650 TABLET ORAL at 08:11

## 2024-07-25 RX ADMIN — SODIUM CHLORIDE: 9 INJECTION, SOLUTION INTRAVENOUS at 19:43

## 2024-07-25 RX ADMIN — APIXABAN 5 MG: 5 TABLET, FILM COATED ORAL at 08:11

## 2024-07-25 RX ADMIN — Medication 400 MG: at 19:34

## 2024-07-25 RX ADMIN — SODIUM CHLORIDE: 9 INJECTION, SOLUTION INTRAVENOUS at 01:51

## 2024-07-25 RX ADMIN — POLYVINYL ALCOHOL 1 DROP: 1.4 SOLUTION/ DROPS OPHTHALMIC at 08:10

## 2024-07-25 RX ADMIN — BUSPIRONE HYDROCHLORIDE 10 MG: 10 TABLET ORAL at 08:11

## 2024-07-25 RX ADMIN — BUSPIRONE HYDROCHLORIDE 10 MG: 10 TABLET ORAL at 19:34

## 2024-07-25 RX ADMIN — POLYVINYL ALCOHOL 1 DROP: 1.4 SOLUTION/ DROPS OPHTHALMIC at 16:28

## 2024-07-25 RX ADMIN — FERROUS SULFATE TAB 325 MG (65 MG ELEMENTAL FE) 325 MG: 325 (65 FE) TAB at 08:11

## 2024-07-25 RX ADMIN — QUETIAPINE FUMARATE 100 MG: 100 TABLET ORAL at 19:34

## 2024-07-25 RX ADMIN — AMIODARONE HYDROCHLORIDE 100 MG: 200 TABLET ORAL at 08:11

## 2024-07-25 RX ADMIN — AMLODIPINE BESYLATE 2.5 MG: 2.5 TABLET ORAL at 08:11

## 2024-07-25 RX ADMIN — QUETIAPINE FUMARATE 100 MG: 100 TABLET ORAL at 08:11

## 2024-07-25 RX ADMIN — INSULIN GLARGINE 15 UNITS: 100 INJECTION, SOLUTION SUBCUTANEOUS at 19:34

## 2024-07-25 RX ADMIN — APIXABAN 5 MG: 5 TABLET, FILM COATED ORAL at 19:34

## 2024-07-25 RX ADMIN — SODIUM BICARBONATE 1300 MG: 650 TABLET ORAL at 19:34

## 2024-07-25 ASSESSMENT — PAIN SCALES - GENERAL
PAINLEVEL_OUTOF10: 0

## 2024-07-25 NOTE — CARE COORDINATION
7/25/24, 8:53 AM EDT    DISCHARGE PLANNING EVALUATION    SW received call from [facility] admissions coordinator, they can accept pt back when medically stable under his medicaid.  Pt is a long-term resident with his spouse.

## 2024-07-25 NOTE — PALLIATIVE CARE
Initial Evaluation        Patient:   Phi Sanz  YOB: 1950  Age:  74 y.o.  Room:  [facility]/027-  MRN:  [id number]   Acct: [id number]    Date of Admission:  7/21/2024  8:20 PM  Date of Service:  7/25/2024  Completed By:  Osman Dexter RN        Reason for Palliative Care Evaluation:-   Goals of Care, High risk readmission, possible need for dialysis, current DNRCCA      Current Concerns   nausea and vomiting     Palliative Performance Scale   60%  Ambulation reduced; Significant disease; Can't do hobbies/housework; intake normal or reduced; occasional assist; LOC full/confusion     History    JOSE, CKD, PAFib, HLD, HTN, SBO possible need for hemodialysis     Goals of Care Discussions and Plan         Advance Care Planning   Goals of Care/Advance Care Planning (ACP) Conversation    Date of Conversation: 07/25/24    Individuals present for the conversation: Patient   Called patients spouse- No Answer  Called patients daughter Carine - she indicates that patients spouse is having a procedure today at a local hospital and will be unavailable today.      ACP documents on file prior to discussion:  -None    Previously completed document/s not on file: Not discussed.    Healthcare Power of /Healthcare Surrogate Decision Makers:  Fern Sanz -spouse [phone]  Reta Aguilar -daughter [phone]    Conversation Summary: Patient resting in bed. Patient is alert & oriented x 3. Palliative care introduced. Patient indicates that he is doing Ok this morning in comparison to when first admitted. Patient indicates that he and his wife have 4 children and his wife had 4 children prior to their marriage. Their 4 children live close by within driving distance of [facility]. Patient is unaware of any advance directives of living valenzuela completed at this time. No documents are on file within the EMR. A recent DNR form was completed during a previous admission on 1/19/24 which indicates

## 2024-07-25 NOTE — CARE COORDINATION
7/25/24, 12:47 PM EDT    DISCHARGE ON GOING EVALUATION    [facility] day: 4  Location: -27/027-A Reason for admit: SBO (small bowel obstruction) (HCC) [K56.609]     Procedures: none    Imaging since last note:   7/24 US Renal: Normal sized kidneys without hydronephrosis. Incidental note a 1.1 x 1.1 x 1.1 cm echogenic structure in the spleen suspicious for a hemangioma.    Barriers to Discharge: Hospitalist, GS and Nephrology following. Dietician. Advanced to regular; carb control diet this afternoon. Continue 0.9% @125. Sodium bicarb tabs TID. Creat 5.5 (6.2) Hgb 8.8 (9.2) Labs in AM.     PCP: Kaushal Ramsay MD  Readmission Risk Score: 20.5    Patient Goals/Plan/Treatment Preferences: Return to [place] [facility]. No precert. SW following.

## 2024-07-26 LAB
ANION GAP SERPL CALC-SCNC: 14 MEQ/L (ref 8–16)
BASOPHILS ABSOLUTE: 0 THOU/MM3 (ref 0–0.1)
BASOPHILS NFR BLD AUTO: 0.6 %
BUN SERPL-MCNC: 56 MG/DL (ref 7–22)
CALCIUM SERPL-MCNC: 8 MG/DL (ref 8.5–10.5)
CHLORIDE SERPL-SCNC: 104 MEQ/L (ref 98–111)
CO2 SERPL-SCNC: 22 MEQ/L (ref 23–33)
CREAT SERPL-MCNC: 4.9 MG/DL (ref 0.4–1.2)
DEPRECATED RDW RBC AUTO: 44.8 FL (ref 35–45)
EOSINOPHIL NFR BLD AUTO: 2.2 %
EOSINOPHILS ABSOLUTE: 0.1 THOU/MM3 (ref 0–0.4)
ERYTHROCYTE [DISTWIDTH] IN BLOOD BY AUTOMATED COUNT: 13.8 % (ref 11.5–14.5)
FERRITIN SERPL IA-MCNC: 543 NG/ML (ref 22–322)
GFR SERPL CREATININE-BSD FRML MDRD: 12 ML/MIN/1.73M2
GLUCOSE BLD STRIP.AUTO-MCNC: 114 MG/DL (ref 70–108)
GLUCOSE BLD STRIP.AUTO-MCNC: 160 MG/DL (ref 70–108)
GLUCOSE BLD STRIP.AUTO-MCNC: 202 MG/DL (ref 70–108)
GLUCOSE BLD STRIP.AUTO-MCNC: 85 MG/DL (ref 70–108)
GLUCOSE SERPL-MCNC: 84 MG/DL (ref 70–108)
HCT VFR BLD AUTO: 27 % (ref 42–52)
HGB BLD-MCNC: 8.9 GM/DL (ref 14–18)
IMM GRANULOCYTES # BLD AUTO: 0.02 THOU/MM3 (ref 0–0.07)
IMM GRANULOCYTES NFR BLD AUTO: 0.6 %
IRON SATN MFR SERPL: 23 % (ref 20–50)
IRON SERPL-MCNC: 35 UG/DL (ref 65–195)
LYMPHOCYTES ABSOLUTE: 0.9 THOU/MM3 (ref 1–4.8)
LYMPHOCYTES NFR BLD AUTO: 29.1 %
MCH RBC QN AUTO: 30.1 PG (ref 26–33)
MCHC RBC AUTO-ENTMCNC: 33 GM/DL (ref 32.2–35.5)
MCV RBC AUTO: 91.2 FL (ref 80–94)
MONOCYTES ABSOLUTE: 0.3 THOU/MM3 (ref 0.4–1.3)
MONOCYTES NFR BLD AUTO: 11.2 %
NEUTROPHILS ABSOLUTE: 1.7 THOU/MM3 (ref 1.8–7.7)
NEUTROPHILS NFR BLD AUTO: 56.3 %
NRBC BLD AUTO-RTO: 0 /100 WBC
PLATELET # BLD AUTO: 159 THOU/MM3 (ref 130–400)
PMV BLD AUTO: 10.8 FL (ref 9.4–12.4)
POTASSIUM SERPL-SCNC: 3.6 MEQ/L (ref 3.5–5.2)
RBC # BLD AUTO: 2.96 MILL/MM3 (ref 4.7–6.1)
SODIUM SERPL-SCNC: 140 MEQ/L (ref 135–145)
TIBC SERPL-MCNC: 151 UG/DL (ref 171–450)
WBC # BLD AUTO: 3.1 THOU/MM3 (ref 4.8–10.8)

## 2024-07-26 PROCEDURE — 6370000000 HC RX 637 (ALT 250 FOR IP): Performed by: INTERNAL MEDICINE

## 2024-07-26 PROCEDURE — 36415 COLL VENOUS BLD VENIPUNCTURE: CPT

## 2024-07-26 PROCEDURE — 82728 ASSAY OF FERRITIN: CPT

## 2024-07-26 PROCEDURE — 85025 COMPLETE CBC W/AUTO DIFF WBC: CPT

## 2024-07-26 PROCEDURE — 99232 SBSQ HOSP IP/OBS MODERATE 35: CPT | Performed by: PHYSICIAN ASSISTANT

## 2024-07-26 PROCEDURE — 2580000003 HC RX 258: Performed by: INTERNAL MEDICINE

## 2024-07-26 PROCEDURE — 80048 BASIC METABOLIC PNL TOTAL CA: CPT

## 2024-07-26 PROCEDURE — 83540 ASSAY OF IRON: CPT

## 2024-07-26 PROCEDURE — 1200000000 HC SEMI PRIVATE

## 2024-07-26 PROCEDURE — 99232 SBSQ HOSP IP/OBS MODERATE 35: CPT | Performed by: INTERNAL MEDICINE

## 2024-07-26 PROCEDURE — 82948 REAGENT STRIP/BLOOD GLUCOSE: CPT

## 2024-07-26 PROCEDURE — 83550 IRON BINDING TEST: CPT

## 2024-07-26 PROCEDURE — 6370000000 HC RX 637 (ALT 250 FOR IP): Performed by: PHYSICIAN ASSISTANT

## 2024-07-26 RX ADMIN — SODIUM CHLORIDE: 9 INJECTION, SOLUTION INTRAVENOUS at 03:46

## 2024-07-26 RX ADMIN — QUETIAPINE FUMARATE 100 MG: 100 TABLET ORAL at 21:40

## 2024-07-26 RX ADMIN — POLYVINYL ALCOHOL 1 DROP: 1.4 SOLUTION/ DROPS OPHTHALMIC at 08:30

## 2024-07-26 RX ADMIN — ATORVASTATIN CALCIUM 10 MG: 10 TABLET, FILM COATED ORAL at 21:40

## 2024-07-26 RX ADMIN — QUETIAPINE FUMARATE 100 MG: 100 TABLET ORAL at 08:28

## 2024-07-26 RX ADMIN — INSULIN GLARGINE 15 UNITS: 100 INJECTION, SOLUTION SUBCUTANEOUS at 21:41

## 2024-07-26 RX ADMIN — SODIUM BICARBONATE 1300 MG: 650 TABLET ORAL at 08:28

## 2024-07-26 RX ADMIN — AMLODIPINE BESYLATE 2.5 MG: 2.5 TABLET ORAL at 08:28

## 2024-07-26 RX ADMIN — Medication 400 MG: at 08:28

## 2024-07-26 RX ADMIN — Medication 400 MG: at 21:40

## 2024-07-26 RX ADMIN — PANTOPRAZOLE SODIUM 40 MG: 40 TABLET, DELAYED RELEASE ORAL at 03:43

## 2024-07-26 RX ADMIN — FERROUS SULFATE TAB 325 MG (65 MG ELEMENTAL FE) 325 MG: 325 (65 FE) TAB at 08:28

## 2024-07-26 RX ADMIN — APIXABAN 5 MG: 5 TABLET, FILM COATED ORAL at 08:28

## 2024-07-26 RX ADMIN — POLYVINYL ALCOHOL 1 DROP: 1.4 SOLUTION/ DROPS OPHTHALMIC at 21:40

## 2024-07-26 RX ADMIN — TAMSULOSIN HYDROCHLORIDE 0.4 MG: 0.4 CAPSULE ORAL at 08:27

## 2024-07-26 RX ADMIN — POLYVINYL ALCOHOL 1 DROP: 1.4 SOLUTION/ DROPS OPHTHALMIC at 14:45

## 2024-07-26 RX ADMIN — SODIUM BICARBONATE 1300 MG: 650 TABLET ORAL at 21:40

## 2024-07-26 RX ADMIN — BUSPIRONE HYDROCHLORIDE 10 MG: 10 TABLET ORAL at 21:40

## 2024-07-26 RX ADMIN — APIXABAN 5 MG: 5 TABLET, FILM COATED ORAL at 21:40

## 2024-07-26 RX ADMIN — BUSPIRONE HYDROCHLORIDE 10 MG: 10 TABLET ORAL at 08:28

## 2024-07-26 RX ADMIN — SODIUM CHLORIDE: 9 INJECTION, SOLUTION INTRAVENOUS at 12:54

## 2024-07-26 RX ADMIN — LEVOTHYROXINE SODIUM 125 MCG: 0.12 TABLET ORAL at 03:43

## 2024-07-26 RX ADMIN — AMIODARONE HYDROCHLORIDE 100 MG: 200 TABLET ORAL at 08:28

## 2024-07-26 ASSESSMENT — PAIN SCALES - GENERAL: PAINLEVEL_OUTOF10: 0

## 2024-07-26 NOTE — CARE COORDINATION
7/26/24, 11:41 AM EDT    Patient goals/plan/ treatment preferences discussed by  and .  Patient goals/plan/ treatment preferences reviewed with patient/ family.  Patient/ family verbalize understanding of discharge plan and are in agreement with goal/plan/treatment preferences.  Understanding was demonstrated using the teach back method.  AVS provided by RN at time of discharge, which includes all necessary medical information pertaining to the patients current course of illness, treatment, post-discharge goals of care, and treatment preferences.     Services At/After Discharge: Long Term Care, Aide services, and Nursing service    LULU spoke with Attending, possible weekend discharge back to [place].    LULU left message with ECF.    [employer] on chart with discharge instructions.  LAMONTE Perales is aware

## 2024-07-27 LAB
ANION GAP SERPL CALC-SCNC: 8 MEQ/L (ref 8–16)
BUN SERPL-MCNC: 50 MG/DL (ref 7–22)
CALCIUM SERPL-MCNC: 7.8 MG/DL (ref 8.5–10.5)
CHLORIDE SERPL-SCNC: 107 MEQ/L (ref 98–111)
CO2 SERPL-SCNC: 23 MEQ/L (ref 23–33)
CREAT SERPL-MCNC: 4.5 MG/DL (ref 0.4–1.2)
DEPRECATED RDW RBC AUTO: 45.2 FL (ref 35–45)
ERYTHROCYTE [DISTWIDTH] IN BLOOD BY AUTOMATED COUNT: 13.7 % (ref 11.5–14.5)
GFR SERPL CREATININE-BSD FRML MDRD: 13 ML/MIN/1.73M2
GLUCOSE BLD STRIP.AUTO-MCNC: 104 MG/DL (ref 70–108)
GLUCOSE BLD STRIP.AUTO-MCNC: 105 MG/DL (ref 70–108)
GLUCOSE BLD STRIP.AUTO-MCNC: 134 MG/DL (ref 70–108)
GLUCOSE BLD STRIP.AUTO-MCNC: 162 MG/DL (ref 70–108)
GLUCOSE SERPL-MCNC: 99 MG/DL (ref 70–108)
HCT VFR BLD AUTO: 26.9 % (ref 42–52)
HGB BLD-MCNC: 8.7 GM/DL (ref 14–18)
MAGNESIUM SERPL-MCNC: 2 MG/DL (ref 1.6–2.4)
MCH RBC QN AUTO: 29.8 PG (ref 26–33)
MCHC RBC AUTO-ENTMCNC: 32.3 GM/DL (ref 32.2–35.5)
MCV RBC AUTO: 92.1 FL (ref 80–94)
PLATELET # BLD AUTO: 148 THOU/MM3 (ref 130–400)
PMV BLD AUTO: 10.6 FL (ref 9.4–12.4)
POTASSIUM SERPL-SCNC: 3.7 MEQ/L (ref 3.5–5.2)
RBC # BLD AUTO: 2.92 MILL/MM3 (ref 4.7–6.1)
SODIUM SERPL-SCNC: 138 MEQ/L (ref 135–145)
WBC # BLD AUTO: 4 THOU/MM3 (ref 4.8–10.8)

## 2024-07-27 PROCEDURE — 36415 COLL VENOUS BLD VENIPUNCTURE: CPT

## 2024-07-27 PROCEDURE — 85027 COMPLETE CBC AUTOMATED: CPT

## 2024-07-27 PROCEDURE — 1200000000 HC SEMI PRIVATE

## 2024-07-27 PROCEDURE — 82948 REAGENT STRIP/BLOOD GLUCOSE: CPT

## 2024-07-27 PROCEDURE — 99232 SBSQ HOSP IP/OBS MODERATE 35: CPT | Performed by: INTERNAL MEDICINE

## 2024-07-27 PROCEDURE — 6370000000 HC RX 637 (ALT 250 FOR IP): Performed by: PHYSICIAN ASSISTANT

## 2024-07-27 PROCEDURE — 83735 ASSAY OF MAGNESIUM: CPT

## 2024-07-27 PROCEDURE — 6370000000 HC RX 637 (ALT 250 FOR IP): Performed by: INTERNAL MEDICINE

## 2024-07-27 PROCEDURE — 80048 BASIC METABOLIC PNL TOTAL CA: CPT

## 2024-07-27 PROCEDURE — 99232 SBSQ HOSP IP/OBS MODERATE 35: CPT | Performed by: PHYSICIAN ASSISTANT

## 2024-07-27 RX ADMIN — QUETIAPINE FUMARATE 100 MG: 100 TABLET ORAL at 08:48

## 2024-07-27 RX ADMIN — TAMSULOSIN HYDROCHLORIDE 0.4 MG: 0.4 CAPSULE ORAL at 08:48

## 2024-07-27 RX ADMIN — INSULIN GLARGINE 15 UNITS: 100 INJECTION, SOLUTION SUBCUTANEOUS at 21:09

## 2024-07-27 RX ADMIN — BUSPIRONE HYDROCHLORIDE 10 MG: 10 TABLET ORAL at 08:48

## 2024-07-27 RX ADMIN — Medication 400 MG: at 11:09

## 2024-07-27 RX ADMIN — SODIUM BICARBONATE 1300 MG: 650 TABLET ORAL at 21:09

## 2024-07-27 RX ADMIN — LEVOTHYROXINE SODIUM 125 MCG: 0.12 TABLET ORAL at 06:47

## 2024-07-27 RX ADMIN — SODIUM BICARBONATE 1300 MG: 650 TABLET ORAL at 08:48

## 2024-07-27 RX ADMIN — POLYVINYL ALCOHOL 1 DROP: 1.4 SOLUTION/ DROPS OPHTHALMIC at 21:09

## 2024-07-27 RX ADMIN — ATORVASTATIN CALCIUM 10 MG: 10 TABLET, FILM COATED ORAL at 21:09

## 2024-07-27 RX ADMIN — PANTOPRAZOLE SODIUM 40 MG: 40 TABLET, DELAYED RELEASE ORAL at 06:47

## 2024-07-27 RX ADMIN — APIXABAN 5 MG: 5 TABLET, FILM COATED ORAL at 08:47

## 2024-07-27 RX ADMIN — QUETIAPINE FUMARATE 100 MG: 100 TABLET ORAL at 21:09

## 2024-07-27 RX ADMIN — AMIODARONE HYDROCHLORIDE 100 MG: 200 TABLET ORAL at 08:47

## 2024-07-27 RX ADMIN — FERROUS SULFATE TAB 325 MG (65 MG ELEMENTAL FE) 325 MG: 325 (65 FE) TAB at 08:48

## 2024-07-27 RX ADMIN — BUSPIRONE HYDROCHLORIDE 10 MG: 10 TABLET ORAL at 21:09

## 2024-07-27 RX ADMIN — APIXABAN 5 MG: 5 TABLET, FILM COATED ORAL at 21:09

## 2024-07-27 RX ADMIN — AMLODIPINE BESYLATE 2.5 MG: 2.5 TABLET ORAL at 08:48

## 2024-07-27 RX ADMIN — Medication 400 MG: at 21:09

## 2024-07-28 LAB
ANION GAP SERPL CALC-SCNC: 11 MEQ/L (ref 8–16)
BUN SERPL-MCNC: 44 MG/DL (ref 7–22)
CALCIUM SERPL-MCNC: 7.7 MG/DL (ref 8.5–10.5)
CHLORIDE SERPL-SCNC: 108 MEQ/L (ref 98–111)
CO2 SERPL-SCNC: 20 MEQ/L (ref 23–33)
CREAT SERPL-MCNC: 4 MG/DL (ref 0.4–1.2)
DEPRECATED RDW RBC AUTO: 46.1 FL (ref 35–45)
ERYTHROCYTE [DISTWIDTH] IN BLOOD BY AUTOMATED COUNT: 13.8 % (ref 11.5–14.5)
GFR SERPL CREATININE-BSD FRML MDRD: 15 ML/MIN/1.73M2
GLUCOSE BLD STRIP.AUTO-MCNC: 123 MG/DL (ref 70–108)
GLUCOSE BLD STRIP.AUTO-MCNC: 235 MG/DL (ref 70–108)
GLUCOSE BLD STRIP.AUTO-MCNC: 72 MG/DL (ref 70–108)
GLUCOSE SERPL-MCNC: 105 MG/DL (ref 70–108)
HCT VFR BLD AUTO: 28 % (ref 42–52)
HGB BLD-MCNC: 8.9 GM/DL (ref 14–18)
MCH RBC QN AUTO: 29.8 PG (ref 26–33)
MCHC RBC AUTO-ENTMCNC: 31.8 GM/DL (ref 32.2–35.5)
MCV RBC AUTO: 93.6 FL (ref 80–94)
PLATELET # BLD AUTO: 144 THOU/MM3 (ref 130–400)
PMV BLD AUTO: 10.5 FL (ref 9.4–12.4)
POTASSIUM SERPL-SCNC: 3.7 MEQ/L (ref 3.5–5.2)
RBC # BLD AUTO: 2.99 MILL/MM3 (ref 4.7–6.1)
SODIUM SERPL-SCNC: 139 MEQ/L (ref 135–145)
WBC # BLD AUTO: 4.7 THOU/MM3 (ref 4.8–10.8)

## 2024-07-28 PROCEDURE — 6370000000 HC RX 637 (ALT 250 FOR IP): Performed by: PHYSICIAN ASSISTANT

## 2024-07-28 PROCEDURE — 36415 COLL VENOUS BLD VENIPUNCTURE: CPT

## 2024-07-28 PROCEDURE — 99232 SBSQ HOSP IP/OBS MODERATE 35: CPT | Performed by: INTERNAL MEDICINE

## 2024-07-28 PROCEDURE — 99232 SBSQ HOSP IP/OBS MODERATE 35: CPT | Performed by: PHYSICIAN ASSISTANT

## 2024-07-28 PROCEDURE — 80048 BASIC METABOLIC PNL TOTAL CA: CPT

## 2024-07-28 PROCEDURE — 6370000000 HC RX 637 (ALT 250 FOR IP): Performed by: INTERNAL MEDICINE

## 2024-07-28 PROCEDURE — 85027 COMPLETE CBC AUTOMATED: CPT

## 2024-07-28 PROCEDURE — 1200000000 HC SEMI PRIVATE

## 2024-07-28 PROCEDURE — 82948 REAGENT STRIP/BLOOD GLUCOSE: CPT

## 2024-07-28 RX ADMIN — FERROUS SULFATE TAB 325 MG (65 MG ELEMENTAL FE) 325 MG: 325 (65 FE) TAB at 07:57

## 2024-07-28 RX ADMIN — Medication 400 MG: at 20:59

## 2024-07-28 RX ADMIN — AMLODIPINE BESYLATE 2.5 MG: 2.5 TABLET ORAL at 07:57

## 2024-07-28 RX ADMIN — INSULIN GLARGINE 15 UNITS: 100 INJECTION, SOLUTION SUBCUTANEOUS at 20:59

## 2024-07-28 RX ADMIN — BUSPIRONE HYDROCHLORIDE 10 MG: 10 TABLET ORAL at 07:57

## 2024-07-28 RX ADMIN — APIXABAN 5 MG: 5 TABLET, FILM COATED ORAL at 20:59

## 2024-07-28 RX ADMIN — POLYVINYL ALCOHOL 1 DROP: 1.4 SOLUTION/ DROPS OPHTHALMIC at 07:58

## 2024-07-28 RX ADMIN — AMIODARONE HYDROCHLORIDE 100 MG: 200 TABLET ORAL at 07:57

## 2024-07-28 RX ADMIN — BUSPIRONE HYDROCHLORIDE 10 MG: 10 TABLET ORAL at 20:59

## 2024-07-28 RX ADMIN — LEVOTHYROXINE SODIUM 125 MCG: 0.12 TABLET ORAL at 06:19

## 2024-07-28 RX ADMIN — APIXABAN 5 MG: 5 TABLET, FILM COATED ORAL at 07:57

## 2024-07-28 RX ADMIN — QUETIAPINE FUMARATE 100 MG: 100 TABLET ORAL at 20:59

## 2024-07-28 RX ADMIN — QUETIAPINE FUMARATE 100 MG: 100 TABLET ORAL at 07:57

## 2024-07-28 RX ADMIN — PANTOPRAZOLE SODIUM 40 MG: 40 TABLET, DELAYED RELEASE ORAL at 06:19

## 2024-07-28 RX ADMIN — POLYVINYL ALCOHOL 1 DROP: 1.4 SOLUTION/ DROPS OPHTHALMIC at 20:59

## 2024-07-28 RX ADMIN — ATORVASTATIN CALCIUM 10 MG: 10 TABLET, FILM COATED ORAL at 20:59

## 2024-07-28 RX ADMIN — Medication 400 MG: at 07:58

## 2024-07-28 RX ADMIN — TAMSULOSIN HYDROCHLORIDE 0.4 MG: 0.4 CAPSULE ORAL at 07:57

## 2024-07-28 RX ADMIN — INSULIN LISPRO 1 UNITS: 100 INJECTION, SOLUTION INTRAVENOUS; SUBCUTANEOUS at 16:48

## 2024-07-28 RX ADMIN — SODIUM BICARBONATE 1300 MG: 650 TABLET ORAL at 07:57

## 2024-07-28 RX ADMIN — SODIUM BICARBONATE 1300 MG: 650 TABLET ORAL at 20:59

## 2024-07-29 LAB
ANION GAP SERPL CALC-SCNC: 12 MEQ/L (ref 8–16)
BUN SERPL-MCNC: 40 MG/DL (ref 7–22)
CALCIUM SERPL-MCNC: 7.9 MG/DL (ref 8.5–10.5)
CHLORIDE SERPL-SCNC: 109 MEQ/L (ref 98–111)
CO2 SERPL-SCNC: 19 MEQ/L (ref 23–33)
CREAT SERPL-MCNC: 3.8 MG/DL (ref 0.4–1.2)
GFR SERPL CREATININE-BSD FRML MDRD: 16 ML/MIN/1.73M2
GLUCOSE BLD STRIP.AUTO-MCNC: 122 MG/DL (ref 70–108)
GLUCOSE BLD STRIP.AUTO-MCNC: 54 MG/DL (ref 70–108)
GLUCOSE BLD STRIP.AUTO-MCNC: 57 MG/DL (ref 70–108)
GLUCOSE SERPL-MCNC: 54 MG/DL (ref 70–108)
POTASSIUM SERPL-SCNC: 3.8 MEQ/L (ref 3.5–5.2)
SODIUM SERPL-SCNC: 140 MEQ/L (ref 135–145)

## 2024-07-29 PROCEDURE — 82948 REAGENT STRIP/BLOOD GLUCOSE: CPT

## 2024-07-29 PROCEDURE — 99239 HOSP IP/OBS DSCHRG MGMT >30: CPT | Performed by: PHYSICIAN ASSISTANT

## 2024-07-29 PROCEDURE — 36415 COLL VENOUS BLD VENIPUNCTURE: CPT

## 2024-07-29 PROCEDURE — 80048 BASIC METABOLIC PNL TOTAL CA: CPT

## 2024-07-29 PROCEDURE — 6370000000 HC RX 637 (ALT 250 FOR IP): Performed by: PHYSICIAN ASSISTANT

## 2024-07-29 PROCEDURE — 6370000000 HC RX 637 (ALT 250 FOR IP): Performed by: INTERNAL MEDICINE

## 2024-07-29 PROCEDURE — 99232 SBSQ HOSP IP/OBS MODERATE 35: CPT | Performed by: INTERNAL MEDICINE

## 2024-07-29 RX ORDER — LEVOTHYROXINE SODIUM 0.05 MG/1
125 TABLET ORAL DAILY
DISCHARGE
Start: 2024-07-29

## 2024-07-29 RX ORDER — AMLODIPINE BESYLATE 5 MG/1
5 TABLET ORAL DAILY
Status: DISCONTINUED | OUTPATIENT
Start: 2024-07-29 | End: 2024-07-29 | Stop reason: HOSPADM

## 2024-07-29 RX ORDER — INSULIN GLARGINE 100 [IU]/ML
15 INJECTION, SOLUTION SUBCUTANEOUS NIGHTLY
Qty: 10 ML | Refills: 3 | DISCHARGE
Start: 2024-07-29

## 2024-07-29 RX ORDER — AMLODIPINE BESYLATE 5 MG/1
5 TABLET ORAL DAILY
Qty: 30 TABLET | Refills: 0 | DISCHARGE
Start: 2024-07-29

## 2024-07-29 RX ORDER — AMLODIPINE BESYLATE 5 MG/1
2.5 TABLET ORAL DAILY
Qty: 30 TABLET | Refills: 3 | DISCHARGE
Start: 2024-07-29 | End: 2024-07-29

## 2024-07-29 RX ADMIN — Medication 400 MG: at 08:26

## 2024-07-29 RX ADMIN — APIXABAN 5 MG: 5 TABLET, FILM COATED ORAL at 08:26

## 2024-07-29 RX ADMIN — AMLODIPINE BESYLATE 5 MG: 5 TABLET ORAL at 12:26

## 2024-07-29 RX ADMIN — AMLODIPINE BESYLATE 2.5 MG: 2.5 TABLET ORAL at 08:26

## 2024-07-29 RX ADMIN — BUSPIRONE HYDROCHLORIDE 10 MG: 10 TABLET ORAL at 08:26

## 2024-07-29 RX ADMIN — QUETIAPINE FUMARATE 100 MG: 100 TABLET ORAL at 08:26

## 2024-07-29 RX ADMIN — LEVOTHYROXINE SODIUM 125 MCG: 0.12 TABLET ORAL at 06:16

## 2024-07-29 RX ADMIN — TAMSULOSIN HYDROCHLORIDE 0.4 MG: 0.4 CAPSULE ORAL at 08:26

## 2024-07-29 RX ADMIN — PANTOPRAZOLE SODIUM 40 MG: 40 TABLET, DELAYED RELEASE ORAL at 06:16

## 2024-07-29 RX ADMIN — AMIODARONE HYDROCHLORIDE 100 MG: 200 TABLET ORAL at 08:26

## 2024-07-29 RX ADMIN — POLYVINYL ALCOHOL 1 DROP: 1.4 SOLUTION/ DROPS OPHTHALMIC at 08:26

## 2024-07-29 RX ADMIN — SODIUM BICARBONATE 1300 MG: 650 TABLET ORAL at 08:26

## 2024-07-29 RX ADMIN — FERROUS SULFATE TAB 325 MG (65 MG ELEMENTAL FE) 325 MG: 325 (65 FE) TAB at 08:26

## 2024-07-29 NOTE — CARE COORDINATION
7/29/24, 12:00 PM EDT    Patient goals/plan/ treatment preferences discussed by  and .  Patient goals/plan/ treatment preferences reviewed with patient/ family.  Patient/ family verbalize understanding of discharge plan and are in agreement with goal/plan/treatment preferences.  Understanding was demonstrated using the teach back method.  AVS provided by RN at time of discharge, which includes all necessary medical information pertaining to the patients current course of illness, treatment, post-discharge goals of care, and treatment preferences.     Services At/After Discharge: Skilled Nursing Facility (SNF), Aide services, In ambulette, Nursing service, OT, and PT    Pt is being discharged today back to [facility].  LULU left message with Paul at [facility].  LACP for  at 1:15 pm.  Pt is aware.  Pt did not want SW to notify family.    LULU faxed AVS.  LAMONTE Eckert is aware

## 2024-07-29 NOTE — CARE COORDINATION
7/29/24, 9:07 AM EDT    DISCHARGE ON GOING EVALUATION    [facility] day: 8  Location: -27/027-A Reason for admit: SBO (small bowel obstruction) (HCC) [K56.609]     Procedures: none    Imaging since last note: none    Barriers to Discharge: Creatinine 3.7 with Nephrology following, diabetes management, Palliative Care following, IV fluids, Eliquis, Retacrit, Protonix, pain and nausea control.    PCP: Kaushal Ramsay MD  Readmission Risk Score: 20.2    Patient Goals/Plan/Treatment Preferences:   Return to [place] [facility]. No precert. SW following.

## 2024-07-29 NOTE — DISCHARGE SUMMARY
Hospitalist Discharge Summary    Patient: Phi Sanz  YOB: 1950  MRN: [id number]   Acct: [id number]    Primary Care Physician: Kaushal Ramsay MD    Admit date  7/21/2024    Discharge date: 7/29/2024     Chief Complaint on presentation: abd pain     Initial H&P and Hospital course:  Patient Flow Center RE: Phi Sanz Patient is 74 y.o. from [facility] ED with Carlita NP transferring. Patient arrived with c/o diffuse abdominal pain along with vomiting and diarrhea. PMHx CKD stage 4, paroxysmal atrial fibrillation, HLD, insulin-dependent diabetes, HTN, and perforated appendix with abscess s/p ileocecectomy 1/2/24 by Dr Noble. Imaging revealed a high-grade SBO with 2 transition points, no free air. Creatinine 4.8 (baseline reportedly is 5.0, he is not on dialysis), WBC normal, glucose 202, alk phos 158, AST,ALT, and Bilirubin normal, Troponin normal, lipase 29. /85, HR 75, RR 19, SpO2 98% RA, afebrile. [facility] states that they can not keep patient at their hospital due to CKD history). Accepted patient in transfer under MARYJO Howell/Dr Chapa to a medical with telemetry level of care. Diagnosis SBO. Inpatient.     Patient was admitted to hospitalist service for SBO, JOSE on CKD stage IV.  Patient was seen by general surgery for SBO.  Was treated with NGT to LIWS.  NGT was removed on 7/23.  Bowel function returned, patient tolerating a general diet.  SBO resolved.  General surgery signed off.  Nephrology was consulted for JOSE on CKD stage IV.  CKD likely from diabetic nephropathy.  Patient was treated with IV fluids, creatinine improved.  Creatinine 3.8 on day of discharge, which is close to his baseline prior to arrival.  Patient to encourage p.o. hydration at discharge.  Needs close follow-up with nephrology.  Repeat BMP in 1 week.  Patient discharged to ECF in stable condition with appropriate follow-up.    Patient responded well to medical management.

## 2024-07-29 NOTE — PROGRESS NOTES
Progress Note    Patient:  Phi Sanz    Unit/Bed:6K-27/027-A  YOB: 1950  MRN: [id number]   Acct: [id number]   Admit date: 7/21/2024      Principal Problem:    SBO (small bowel obstruction) ([facility])  Active Problems:    DNR (do not resuscitate)    Primary hypertension    Benign prostatic hyperplasia    Anemia of chronic renal failure, stage 4 (severe) ([facility])  Resolved Problems:    * No resolved hospital problems. *    Disposition continue inpatient care with NG tube placement secondary to high-grade small bowel obstruction possibly related to previous hospitalization for perforated appendix in January 2024      Assessment and Plan:  High-grade small bowel obstruction patient is n.p.o. receiving IV isotonic fluids to maintain intravascular support will continue NG tube this is day 2 of NG tube placement he appears to have a good amount of gastric contents aspirated from NG tube will follow surgery recommendations  Chronic kidney disease stage IV patient is followed by nephrology I truly appreciate nephrology assistance will continue IV isotonic fluids and monitor his urine output  Anemia patient's current hemoglobin is over 12 and is adequate however in previous hospitalization in January 2024 he did require 1 unit of PRBC he may have a component of hemoconcentration based on his presentation and clinical presentation will monitor CBC daily  Pericardial effusion patient appears of had a pericardial fusion in previous hospitalization if possible request repeat echocardiogram or consider repeat echocardiogram to ensure there is not an increase and size  Paroxysmal atrial fibrillation patient appears to be in sinus rhythm on palpation and auscultation at bedside  Moderate aortic stenosis  Hospitalization January 2024 for perforated appendix with concomitant acute kidney injury on chronic kidney disease at that time with acute tubular necrosis suspected.        Patient Seen, 
                           Progress Note    Patient:  Phi Sanz    Unit/Bed:6K-27/027-A  YOB: 1950  MRN: [id number]   Acct: [id number]   Admit date: 7/21/2024      Principal Problem:    SBO (small bowel obstruction) ([facility])  Active Problems:    Moderate malnutrition ([facility])    DNR (do not resuscitate)    Primary hypertension    Benign prostatic hyperplasia    Anemia of chronic renal failure, stage 4 (severe) ([facility])  Resolved Problems:    * No resolved hospital problems. *    Disposition continue inpatient care, NG tube has been removed successfully he has had no nausea vomiting however is creatinine has up trended to proximately 6 mg/dL will continue IV isotonic fluids suspected prerenal injury, patient is from a skilled nursing facility and when his renal function stabilizes and he has no recurrence of nausea vomiting or bowel obstruction he had to be eligible for discharge to skilled nursing facility      Assessment and Plan:  High-grade small bowel obstruction, resolved patient has NG tube removed he has had no further episodes of nausea or vomiting this morning and abdomen is soft, may be predisposed with recent appendectomy.   Chronic kidney disease stage IV patient is followed by nephrology I truly appreciate nephrology assistance will continue IV isotonic fluids and monitor his urine output patient's serum creatinine up trended mildly overnight and his serum bicarb is adequate at this time and his serum potassium is adequate therefore no acute hemodialysis ought to be required will continue to monitor urine output  Anemia patient's current hemoglobin is approximately 10 g/dL is adequate however in previous hospitalization in January 2024 he did require 1 unit of PRBC, on presentation his hemoglobin is approximately 12 g/dL and likely had a mild component of hemoconcentration  Pericardial effusion patient appears of had a pericardial fusion in previous hospitalization if possible request 
    Hospitalist Progress Note      Patient:  Phi Sanz 74 y.o. male       : 1950  Unit/Bed:6-27/027-A    Date of Admission: 2024      ASSESSMENT AND PLAN    Active Problems  JOSE on CKD stage IV vs progressive CKD  Nephrology following.   Feels this is more likely progression of CKD from diabetic nephropathy.  Slight improvement in creatinine, hold off on starting HD at this time, but high risk to start.  Continue with IVF per nephrology.  Improving, creatinine 4.9.  Metabolic acidosis  Neph following. On sodium bicarb tabs.   Chronic anemia  No signs of acute bleeding.  Anemia of chronic disease.  Goals of care  Pt unsure if he would want to do dialysis. Palliative care following and assisting pt with decision making.     Resolved Problems  SBO   General surgery following.   NGT removed .   Tolerating general diet, bowel function returned.   GS signing off.   Hypotension     Chronic Conditions (reviewed, stable, and home medications resumed, unless otherwise stated)  Pericardial effusion: Mod, 1-2cm effusion without tamponade on Echo 2024.   PAF: amiodarone, Eliquis.   Severe aortic stenosis: CAREN 1.0 on Echo 2024.   IDDMII  Hypothyroidism  GERD  Essential HTN  BPH  Code status: DNR-CCA.       LDA: []CVC / []PICC / []Midline / []Meraz / []Drains / []Mediport / [x]None  Antibiotics: no  Steroids: no  Labs (still needed?): [x]Yes / []No  IVF (still needed?): [x]Yes / []No    Level of care: []Step Down / [x]Med-Surg  Bed Status: [x]Inpatient / []Observation  Telemetry: []Yes / [x]No  PT/OT: []Yes / [x]No    DVT Prophylaxis: [] Lovenox / [] Heparin / [] SCDs / [x] Already on Systemic Anticoagulation / [] None     Expected discharge date:  tbd  Disposition: tbd     Code status: DNR-CCA     ===================================================================    Chief Complaint: N/V/D and abdominal pain   Subjective (past 24 hours):   : Patient reports generally not feeling well today.  No 
    Hospitalist Progress Note      Patient:  Phi Sanz 74 y.o. male       : 1950  Unit/Bed:6-27/027-A    Date of Admission: 2024      ASSESSMENT AND PLAN    Active Problems  JOSE on CKD stage IV vs progressive CKD  Nephrology following.   Feels this is more likely progression of CKD from diabetic nephropathy.  Slight improvement in creatinine, hold off on starting HD at this time, but high risk to start.  Continue with IVF per nephrology.  Metabolic acidosis  Neph following. On sodium bicarb tabs.   Chronic anemia  No signs of acute bleeding. Iron studies were ordered for AM.   Goals of care  Pt unsure if he wants to do dialysis or continue with any further treatment. Palliative care following and assisting pt with decision making.     Resolved Problems  SBO   General surgery following.   NGT removed .   Tolerating CLD, bowel function returned.   Adv to reg diet per GS.   GS signing off.   Hypotension     Chronic Conditions (reviewed, stable, and home medications resumed, unless otherwise stated)  Pericardial effusion: Mod, 1-2cm effusion without tamponade on Echo 2024.   PAF: amiodarone, Eliquis.   Severe aortic stenosis: CAREN 1.0 on Echo 2024.   IDDMII  Hypothyroidism  GERD  Essential HTN  BPH  Code status: DNR-CCA.       LDA: []CVC / []PICC / []Midline / []Mearz / []Drains / []Mediport / [x]None  Antibiotics: no  Steroids: no  Labs (still needed?): [x]Yes / []No  IVF (still needed?): [x]Yes / []No    Level of care: []Step Down / [x]Med-Surg  Bed Status: [x]Inpatient / []Observation  Telemetry: []Yes / [x]No  PT/OT: []Yes / [x]No    DVT Prophylaxis: [] Lovenox / [] Heparin / [] SCDs / [x] Already on Systemic Anticoagulation / [] None     Expected discharge date:  tbd  Disposition: tbd     Code status: DNR-CCA     ===================================================================    Chief Complaint: N/V/D and abdominal pain   Subjective (past 24 hours):   : I took over care today. 
    Hospitalist Progress Note      Patient:  Phi Sanz 74 y.o. male       : 1950  Unit/Bed:6K-27/027-A    Date of Admission: 2024      ASSESSMENT AND PLAN    Active Problems  JOSE on CKD stage IV vs progressive CKD  Nephrology following.   Feels this is more likely progression of CKD from diabetic nephropathy.  Slight improvement in creatinine, hold off on starting HD at this time, but high risk to start.  Continue with IVF per nephrology.  Improving, creatinine 4.0.  Metabolic acidosis  Neph following. On sodium bicarb tabs.   Chronic anemia  No signs of acute bleeding.    Anemia of chronic disease.  Retacrit added per nephrology  Goals of care  Pt unsure if he would want to do dialysis. Palliative care following and assisting pt with decision making.     Resolved Problems  SBO   General surgery following.   NGT removed .   Tolerating general diet, bowel function returned.   GS signing off.   Hypotension   Fever  Isolated temp of 101.3 overnight . Given patient is asymptomatic and this was an isolated event, will monitor for now.  If fever recurs will order further workup.    Chronic Conditions (reviewed, stable, and home medications resumed, unless otherwise stated)  Pericardial effusion: Mod, 1-2cm effusion without tamponade on Echo 2024.   PAF: amiodarone, Eliquis.   Severe aortic stenosis: CAREN 1.0 on Echo 2024.   IDDMII  Hypothyroidism  GERD  Essential HTN  BPH  Code status: DNR-CCA.       LDA: []CVC / []PICC / []Midline / []Meraz / []Drains / []Mediport / [x]None  Antibiotics: no  Steroids: no  Labs (still needed?): [x]Yes / []No  IVF (still needed?): [x]Yes / []No    Level of care: []Step Down / [x]Med-Surg  Bed Status: [x]Inpatient / []Observation  Telemetry: []Yes / [x]No  PT/OT: []Yes / [x]No    DVT Prophylaxis: [] Lovenox / [] Heparin / [] SCDs / [x] Already on Systemic Anticoagulation / [] None     Expected discharge date:  tbd  Disposition: tbd     Code status: DNR-CCA 
    Hospitalist Progress Note      Patient:  Phi Sanz 74 y.o. male       : 1950  Unit/Bed:[facility]27/027-A    Date of Admission: 2024      ASSESSMENT AND PLAN    Active Problems  JOSE on CKD stage IV vs progressive CKD  Nephrology following.   Feels this is more likely progression of CKD from diabetic nephropathy.  Slight improvement in creatinine, hold off on starting HD at this time, but high risk to start.  Continue with IVF per nephrology.  Improving, creatinine 4.5.  Metabolic acidosis  Neph following. On sodium bicarb tabs.   Chronic anemia  No signs of acute bleeding.    Anemia of chronic disease.  Retacrit added per nephrology  Fever:   Patient had one-time temp of 101.3 overnight.  Patient was unaware of this, states he had no symptoms.  Denies any body aches, diaphoresis, chills, cough, congestion, abdominal pain, N/V/D.  Given patient is asymptomatic and this was an isolated event, will monitor for now.  If fever recurs will order further workup.  Goals of care  Pt unsure if he would want to do dialysis. Palliative care following and assisting pt with decision making.     Resolved Problems  SBO   General surgery following.   NGT removed .   Tolerating general diet, bowel function returned.   GS signing off.   Hypotension     Chronic Conditions (reviewed, stable, and home medications resumed, unless otherwise stated)  Pericardial effusion: Mod, 1-2cm effusion without tamponade on Echo 2024.   PAF: amiodarone, Eliquis.   Severe aortic stenosis: CAREN 1.0 on Echo 2024.   IDDMII  Hypothyroidism  GERD  Essential HTN  BPH  Code status: DNR-CCA.       LDA: []CVC / []PICC / []Midline / []Meraz / []Drains / []Mediport / [x]None  Antibiotics: no  Steroids: no  Labs (still needed?): [x]Yes / []No  IVF (still needed?): [x]Yes / []No    Level of care: []Step Down / [x]Med-Surg  Bed Status: [x]Inpatient / []Observation  Telemetry: []Yes / [x]No  PT/OT: []Yes / [x]No    DVT Prophylaxis: [] Lovenox 
[facility]   Dr. Chaitanya Noble MD  Daily Progress Note  Pt Name: Phi Sanz  Medical Record Number: [id number]  Date of Birth 1950   Today's Date: 7/23/2024    HD#1    CHIEF COMPLAINTPossible small bowel obstruction    SUBJECTIVE  Patient Sleepy not really communicative NG tube has been removed by patient  OBJECTIVE  CURRENT VITALS BP (!) 105/54   Pulse 73   Temp 98.1 °F (36.7 °C) (Oral)   Resp 16   Ht 1.829 m (6' 0.01\")   Wt 74.8 kg (164 lb 14.5 oz)   SpO2 97%   BMI 22.36 kg/m²   LUNGS: Lungs clear   ABDOMEN: Relatively soft and some pain to palpation but bowel sounds positive  WOUNDS: Old midline wound is well-healed  24 HR INTAKE/OUTPUT :   Intake/Output Summary (Last 24 hours) at 7/23/2024 1629  Last data filed at 7/22/2024 2122  Gross per 24 hour   Intake 0 ml   Output 300 ml   Net -300 ml     DRAIN/TUBE OUTPUT : [REMOVED] NG/OG/NJ/NE Tube Nasogastric Left nostril-Output (mL): 1200 ml  [REMOVED] NG/OG/NJ/NE Tube Nasogastric 14 fr Left nostril-Output (mL): 300 ml    LABS  CBC :   Lab Results   Component Value Date/Time    WBC 2.7 07/23/2024 07:44 AM    HGB 10.2 07/23/2024 07:44 AM    HCT 31.3 07/23/2024 07:44 AM     07/23/2024 07:44 AM     BMP:   Lab Results   Component Value Date/Time     07/23/2024 11:16 AM    K 4.5 07/23/2024 11:16 AM    K 4.5 07/21/2024 10:27 PM     07/23/2024 11:16 AM    CO2 26 07/23/2024 11:16 AM    BUN 70 07/23/2024 11:16 AM    CREATININE 6.3 07/23/2024 11:16 AM    MG 1.9 01/12/2024 05:33 AM    PHOS 3.4 01/16/2024 05:35 AM       ASSESSMENT  1. Patient who presented with evidence of small bowel obstruction he is removed his NG tubeHis abdomen is softBowel sounds present would continue to hold diet though to at least tomorrow we will recheck tomorrow if no nausea or vomiting likely clear liquids tomorrow      PLAN  1. As above      Chaitanya Noble MD  Electronically signed 7/23/2024 at 4:29 PM    
Comprehensive Nutrition Assessment    Type and Reason for Visit:  Initial, Positive Nutrition Screen, Wound    Nutrition Recommendations/Plan:   Recommend a Multivitamin daily.  Advance diet when medically appropriate.  Pt declines all ONS during LOS.  Will monitor need for additional nutrition interventions.      Malnutrition Assessment:  Malnutrition Status:  Moderate malnutrition (07/23/24 1117)    Context:  Chronic Illness     Findings of the 6 clinical characteristics of malnutrition:  Energy Intake:  75% or less estimated energy requirements for 1 month or longer  Weight Loss:  No significant weight loss (in 7 months per EMR)     Body Fat Loss:   (Moderate Body Fat Loss)     Muscle Mass Loss:   (Moderate Muscle Mass Loss)    Fluid Accumulation:  No significant fluid accumulation Extremities   Strength:  Not Performed    Nutrition Assessment: Pt. moderately malnourished AEB criteria as listed above. At risk for further nutrition compromise r/t admit d/t nausea/emesis/diarrhea with possible small bowel obstruction, JOSE on CKD, Atrial Fibrillation, increased nutrient needs to aid in wound healing and underlying medical condition (Hx: CKD, DM, Hypothyroidism, HTN, HLD),    Nutrition Related Findings:    Pt. Report/Treatments/Miscellaneous: Pt seen- NGT to LIWS. Pt NPO at present. Pt reports decreased appetite and intake of meals over the past month or so d/t decreased appetite. Pt declines all ONS when diet is advanced and diet education during LOS. when his diet is advanced. Pt denies any N/V or chewing/swallowing difficulty with food.  GI Status: No BM  yet.  Pertinent Labs: 7/21/24: BUN 50, Cr. 5.1, Glucose 186.  Pertinent Meds: Lipitor, Iron 325, Lantus, Humalog, Synthroid, Protonix, Sodium Bicarbonate, IVF   Wound Type: Pressure Injury, Stage II (on coccyx)       Current Nutrition Intake & Therapies:    Average Meal Intake: NPO  Average Supplements Intake: NPO  Diet NPO Exceptions are: Sips of Water with 
Comprehensive Nutrition Assessment    Type and Reason for Visit:  Reassess (po intake)    Nutrition Recommendations/Plan:   Consider renal MVI, folbee plus.  Patient declines all oral nutritional supplements.  Continue current diet.  Encouraged oral intake. Will provide cottage cheese daily.  Goals of care pending-palliative care assisting.      Malnutrition Assessment:  Malnutrition Status:  Moderate malnutrition (07/23/24 1117)    Context:  Chronic Illness     Findings of the 6 clinical characteristics of malnutrition:  Energy Intake:  75% or less estimated energy requirements for 1 month or longer  Weight Loss:  No significant weight loss (in 7 months per EMR)     Body Fat Loss:   (Moderate Body Fat Loss)     Muscle Mass Loss:   (Moderate Muscle Mass Loss)    Fluid Accumulation:  No significant fluid accumulation Extremities   Strength:  Not Performed    Nutrition Assessment:     Pt improving from a nutritional standpoint AEB reports of tolerating diet and some meal intake greater than 75%.  Remains at risk for further nutritional compromise r/t moderate malnutrition, increased nutrient needs to support wound healing, admit d/t nausea/emesis/diarrhea with possible small bowel obstruction, JOSE on CKD, Atrial Fibrillation, increased nutrient needs to aid in wound healing and underlying medical condition (Hx: CKD, DM, Hypothyroidism, HTN, HLD)        Nutrition Related Findings:    Pt. Report/Treatments/Miscellaneous: Patient seen, had just finished breakfast, ate all of [place] toast and sausage.  Denied any diet questions at this time.  1800 mls urine output in the last 24 hours. Note patient at high risk of needing to start HD but he is unsure if he wants to or not, palliative care following.   GI Status: BM 7/26/24  Pertinent Labs: 7/26/24: Sodium 140, Potassium 3.6, BUN 56, Creatinine 4.9  Pertinent Meds: 0.9 NaCl at 125 ml/hr, lipitor, iron, lantus, humalog, synthroid, mag-ox, protonix, sodium bicarbonate    
[facility]   Dr. Chaitanya Noble MD  Daily Progress Note  Pt Name: Phi Sanz  Medical Record Number: [id number]  Date of Birth 1950   Today's Date: 7/25/2024    HD#3    CHIEF COMPLAINTresolving SBO    SUBJECTIVE  Patient feels well. More talkative today    OBJECTIVE  CURRENT VITALS BP (!) 171/76   Pulse 69   Temp 98.3 °F (36.8 °C) (Oral)   Resp 12   Ht 1.829 m (6' 0.01\")   Wt 72.1 kg (158 lb 15.2 oz)   SpO2 96%   BMI 21.55 kg/m²   LUNGS: Lungs clear   ABDOMEN: soft BS active  WOUNDS: n/a  24 HR INTAKE/OUTPUT :   Intake/Output Summary (Last 24 hours) at 7/25/2024 1220  Last data filed at 7/25/2024 0909  Gross per 24 hour   Intake 680 ml   Output 800 ml   Net -120 ml     DRAIN/TUBE OUTPUT : [REMOVED] NG/OG/NJ/NE Tube Nasogastric Left nostril-Output (mL): 1200 ml  [REMOVED] NG/OG/NJ/NE Tube Nasogastric 14 fr Left nostril-Output (mL): 300 ml    LABS  CBC :   Lab Results   Component Value Date/Time    WBC 2.3 07/25/2024 04:55 AM    HGB 8.8 07/25/2024 04:55 AM    HCT 27.4 07/25/2024 04:55 AM     07/25/2024 04:55 AM     BMP:   Lab Results   Component Value Date/Time     07/25/2024 04:55 AM    K 4.1 07/25/2024 04:55 AM    K 4.5 07/21/2024 10:27 PM    CL 99 07/25/2024 04:55 AM    CO2 21 07/25/2024 04:55 AM    BUN 68 07/25/2024 04:55 AM    CREATININE 5.5 07/25/2024 04:55 AM    MG 1.9 01/12/2024 05:33 AM    PHOS 3.4 01/16/2024 05:35 AM       ASSESSMENT  1. Pt SBO appears to have resolved abd soft       PLAN  1. Inc to reg diet will see as needed      Chaitanya Noble MD  Electronically signed 7/25/2024 at 12:20 PM    
[facility]   Nephrology progress note  7/23/2024, 4:36 PM      Pt Name:    Phi Sanz  MRN:     [id number]     YOB: 1950  Admit Date:    7/21/2024  8:20 PM    Chief Complaint: Nephrology following for JOSE/CKD.    Subjective:  Patient was seen and examined this morning.  He removed NG tube.  Not very communciative.   Soft BP at times.   Low urine output.  No SOB/edema. On room air.    Objective:  24HR INTAKE/OUTPUT:    Intake/Output Summary (Last 24 hours) at 7/23/2024 1636  Last data filed at 7/22/2024 2122  Gross per 24 hour   Intake 0 ml   Output 300 ml   Net -300 ml         I/O last 3 completed shifts:  In: 0   Out: 1800 [Emesis/NG output:1800]  No intake/output data recorded.   Admission weight: 74.2 kg (163 lb 9.3 oz)  Wt Readings from Last 3 Encounters:   07/23/24 74.8 kg (164 lb 14.5 oz)   01/14/24 77.3 kg (170 lb 6.7 oz)   09/26/23 77.1 kg (170 lb)        Vitals :   Vitals:    07/23/24 0400 07/23/24 0822 07/23/24 1123 07/23/24 1635   BP: (!) 100/50 (!) 108/55 (!) 105/54 (!) 110/55   Pulse:  72 73 76   Resp:  16 16 16   Temp:  98.3 °F (36.8 °C) 98.1 °F (36.7 °C) 98.5 °F (36.9 °C)   TempSrc:  Oral Oral Oral   SpO2:  95% 97% 94%   Weight:       Height:           Physical examination  General Appearance: awake, not very communicative  Mouth/Throat: Oral mucosa moist  Neck: No JVD  Lungs: clear  Heart:  S1, S2 heard  GI: soft, tender  Extremities: no LE edema    Medications:  Infusion:    sodium chloride 75 mL/hr at 07/23/24 0821    sodium chloride      dextrose       Meds:    amiodarone  100 mg Oral Daily    amLODIPine  5 mg Oral Daily    apixaban  5 mg Oral BID    atorvastatin  10 mg Oral Nightly    busPIRone  10 mg Oral BID    ferrous sulfate  325 mg Oral Daily with breakfast    levothyroxine  125 mcg Oral Daily    magnesium oxide  400 mg Oral BID    QUEtiapine  100 mg Oral BID    sodium bicarbonate  1,300 mg Oral BID    tamsulosin  0.4 mg Oral Daily    pantoprazole  40 
[facility]   Nephrology progress note  7/24/2024, 2:40 PM      Pt Name:    Phi Sanz  MRN:     [id number]     YOB: 1950  Admit Date:    7/21/2024  8:20 PM    Chief Complaint: Nephrology following for JOSE/CKD.    Subjective:  Patient was seen and examined this morning.  Had a BM.   Has been agitated.      Objective:  24HR INTAKE/OUTPUT:    Intake/Output Summary (Last 24 hours) at 7/24/2024 1440  Last data filed at 7/24/2024 1439  Gross per 24 hour   Intake 480 ml   Output --   Net 480 ml         I/O last 3 completed shifts:  In: 0   Out: 300 [Emesis/NG output:300]  I/O this shift:  In: 480 [P.O.:480]  Out: -    Admission weight: 74.2 kg (163 lb 9.3 oz)  Wt Readings from Last 3 Encounters:   07/24/24 72.1 kg (158 lb 15.2 oz)   01/14/24 77.3 kg (170 lb 6.7 oz)   09/26/23 77.1 kg (170 lb)        Vitals :   Vitals:    07/24/24 0006 07/24/24 0358 07/24/24 0857 07/24/24 1157   BP: 135/66 (!) 119/56 139/63 (!) 108/51   Pulse: 78 84 75 74   Resp: 16 16 14 14   Temp: 97.7 °F (36.5 °C) 98.2 °F (36.8 °C) 98.6 °F (37 °C) 98.4 °F (36.9 °C)   TempSrc: Oral Oral Oral Oral   SpO2: 96% 97% 96% 95%   Weight:  72.1 kg (158 lb 15.2 oz)     Height:           Physical examination  General Appearance: awake, agitated  Mouth/Throat: Oral mucosa moist  Neck: No JVD  Lungs: clear  Heart:  S1, S2 heard  GI: soft, tender  Extremities: no LE edema    Medications:  Infusion:    sodium chloride 125 mL/hr at 07/24/24 0632    sodium chloride      dextrose       Meds:    [START ON 7/25/2024] amLODIPine  2.5 mg Oral Daily    amiodarone  100 mg Oral Daily    apixaban  5 mg Oral BID    atorvastatin  10 mg Oral Nightly    busPIRone  10 mg Oral BID    ferrous sulfate  325 mg Oral Daily with breakfast    levothyroxine  125 mcg Oral Daily    magnesium oxide  400 mg Oral BID    QUEtiapine  100 mg Oral BID    sodium bicarbonate  1,300 mg Oral BID    tamsulosin  0.4 mg Oral Daily    pantoprazole  40 mg Oral QAM AC    
[facility]   Nephrology progress note  7/25/2024, 12:03 PM      Pt Name:    Phi Sanz  MRN:     [id number]     YOB: 1950  Admit Date:    7/21/2024  8:20 PM    Chief Complaint: Nephrology following for JOSE/CKD.    Subjective:  Patient was seen and examined this morning.  Had 800 mL urine output past 24 hours.   No SOB, on room air.   Denies abdominal pain.      Objective:  24HR INTAKE/OUTPUT:    Intake/Output Summary (Last 24 hours) at 7/25/2024 1203  Last data filed at 7/25/2024 0909  Gross per 24 hour   Intake 680 ml   Output 800 ml   Net -120 ml         I/O last 3 completed shifts:  In: 480 [P.O.:480]  Out: 800 [Urine:800]  I/O this shift:  In: 200 [P.O.:200]  Out: -    Admission weight: 74.2 kg (163 lb 9.3 oz)  Wt Readings from Last 3 Encounters:   07/24/24 72.1 kg (158 lb 15.2 oz)   01/14/24 77.3 kg (170 lb 6.7 oz)   09/26/23 77.1 kg (170 lb)        Vitals :   Vitals:    07/24/24 0358 07/25/24 0008 07/25/24 0340 07/25/24 0800   BP:  (!) 130/57 (!) 155/64 (!) 171/76   Pulse:  78 72 69   Resp:  16 16 12   Temp:  98 °F (36.7 °C) 97.5 °F (36.4 °C) 98.3 °F (36.8 °C)   TempSrc:  Oral Oral Oral   SpO2:  100% 98% 96%   Weight: 72.1 kg (158 lb 15.2 oz)      Height:           Physical examination  General Appearance: awake, agitated  Mouth/Throat: Oral mucosa moist  Neck: No JVD  Lungs: clear  Heart:  S1, S2 heard  GI: soft, tender  Extremities: no LE edema    Medications:  Infusion:    sodium chloride 125 mL/hr at 07/25/24 0151    sodium chloride      dextrose       Meds:    amLODIPine  2.5 mg Oral Daily    polyvinyl alcohol  1 drop Both Eyes TID    amiodarone  100 mg Oral Daily    apixaban  5 mg Oral BID    atorvastatin  10 mg Oral Nightly    busPIRone  10 mg Oral BID    ferrous sulfate  325 mg Oral Daily with breakfast    levothyroxine  125 mcg Oral Daily    magnesium oxide  400 mg Oral BID    QUEtiapine  100 mg Oral BID    sodium bicarbonate  1,300 mg Oral BID    tamsulosin  
[facility]   Nephrology progress note  7/26/2024, 2:10 PM      Pt Name:    Phi Sanz  MRN:     [id number]     YOB: 1950  Admit Date:    7/21/2024  8:20 PM    Chief Complaint: Nephrology following for JOSE/CKD.    Subjective:  Patient was seen and examined this morning.  Late entry.   Tolerating regular diet.   Good urine output.      Objective:  24HR INTAKE/OUTPUT:    Intake/Output Summary (Last 24 hours) at 7/26/2024 1410  Last data filed at 7/26/2024 1003  Gross per 24 hour   Intake 580 ml   Output 850 ml   Net -270 ml         I/O last 3 completed shifts:  In: 540 [P.O.:540]  Out: 2200 [Urine:2200]  I/O this shift:  In: 240 [P.O.:240]  Out: -    Admission weight: 74.2 kg (163 lb 9.3 oz)  Wt Readings from Last 3 Encounters:   07/24/24 72.1 kg (158 lb 15.2 oz)   01/14/24 77.3 kg (170 lb 6.7 oz)   09/26/23 77.1 kg (170 lb)        Vitals :   Vitals:    07/25/24 2345 07/26/24 0330 07/26/24 0749 07/26/24 1144   BP: (!) 142/65 (!) 150/76 (!) 146/70 (!) 159/78   Pulse: 93 86 70 75   Resp: 18 17 18 18   Temp: 98 °F (36.7 °C) 97.9 °F (36.6 °C) 98 °F (36.7 °C) 97.9 °F (36.6 °C)   TempSrc: Oral Oral Oral Oral   SpO2: 96% 100% 97% 97%   Weight:       Height:           Physical examination  General Appearance: awake, agitated  Mouth/Throat: Oral mucosa moist  Neck: No JVD  Lungs: clear  Heart:  S1, S2 heard  GI: soft, tender  Extremities: no LE edema    Medications:  Infusion:    sodium chloride 125 mL/hr at 07/26/24 1254    sodium chloride      dextrose       Meds:    epoetin marbella-epbx  10,000 Units SubCUTAneous Once    amLODIPine  2.5 mg Oral Daily    polyvinyl alcohol  1 drop Both Eyes TID    amiodarone  100 mg Oral Daily    apixaban  5 mg Oral BID    atorvastatin  10 mg Oral Nightly    busPIRone  10 mg Oral BID    ferrous sulfate  325 mg Oral Daily with breakfast    levothyroxine  125 mcg Oral Daily    magnesium oxide  400 mg Oral BID    QUEtiapine  100 mg Oral BID    sodium 
[facility]   Nephrology progress note  7/29/2024, 1:39 PM      Pt Name:    Phi Sanz  MRN:     [id number]     YOB: 1950  Admit Date:    7/21/2024  8:20 PM    Chief Complaint: Nephrology following for JOSE/CKD.    Subjective:  Patient was seen and examined this morning.  Late entry.   Resting comfortably  On room air      Objective:  24HR INTAKE/OUTPUT:  No intake or output data in the 24 hours ending 07/29/24 1339        I/O last 3 completed shifts:  In: 7506.5 [P.O.:930; I.V.:6576.5]  Out: 0   No intake/output data recorded.   Admission weight: 74.2 kg (163 lb 9.3 oz)  Wt Readings from Last 3 Encounters:   07/24/24 72.1 kg (158 lb 15.2 oz)   01/14/24 77.3 kg (170 lb 6.7 oz)   09/26/23 77.1 kg (170 lb)        Vitals :   Vitals:    07/29/24 0000 07/29/24 0415 07/29/24 0815 07/29/24 1115   BP: (!) 152/80 (!) 158/77 (!) 141/58 (!) 179/90   Pulse: 72 70 72 81   Resp: 16 16 16 16   Temp: 98.6 °F (37 °C) 98.2 °F (36.8 °C) 98.7 °F (37.1 °C) 97.6 °F (36.4 °C)   TempSrc: Oral Oral Oral Oral   SpO2: 99% 99% 98% 99%   Weight:       Height:           Physical examination  General Appearance: awake  Mouth/Throat: Oral mucosa moist  Neck: No JVD  Lungs: No use of accessory muscle use noted.  No labored breathing.  Extremities: no LE edema noted    Medications:  Infusion:    sodium chloride Stopped (07/29/24 1226)    sodium chloride      dextrose       Meds:    amLODIPine  5 mg Oral Daily    epoetin marbella-epbx  10,000 Units SubCUTAneous Once    polyvinyl alcohol  1 drop Both Eyes TID    amiodarone  100 mg Oral Daily    apixaban  5 mg Oral BID    atorvastatin  10 mg Oral Nightly    busPIRone  10 mg Oral BID    ferrous sulfate  325 mg Oral Daily with breakfast    levothyroxine  125 mcg Oral Daily    magnesium oxide  400 mg Oral BID    QUEtiapine  100 mg Oral BID    sodium bicarbonate  1,300 mg Oral BID    tamsulosin  0.4 mg Oral Daily    pantoprazole  40 mg Oral QAM AC    insulin glargine  15 
Patient Flow Center RE: Phi Sanz Patient is 74 y.o. from [facility] ED with Carlita NP transferring. Patient arrived with c/o diffuse abdominal pain along with vomiting and diarrhea. PMHx CKD stage 4, paroxysmal atrial fibrillation, HLD, insulin-dependent diabetes, HTN, and perforated appendix with abscess s/p ileocecectomy 1/2/24 by Dr Noble. Imaging revealed a high-grade SBO with 2 transition points, no free air. Creatinine 4.8 (baseline reportedly is 5.0, he is not on dialysis), WBC normal, glucose 202, alk phos 158, AST,ALT, and Bilirubin normal, Troponin normal, lipase 29. /85, HR 75, RR 19, SpO2 98% RA, afebrile. [facility] states that they can not keep patient at their hospital due to CKD history). Accepted patient in transfer under MARYJO Howell/Dr Chapa to a medical with telemetry level of care. Diagnosis SBO. Inpatient.  
Patient discharged to [facility]. Patient transported via [facility]. This RN attempted to call facility at [phone] extension [phone]. Was transferred to a nurse and was on hold until was hung up on. Patient discharged.   
Patient's IV site leaking. Patient refused to have a new IV access for now and has become agitated/irritable. Will try again later. Provider updated.  
Surg Pt seen consult to follow  
Tech to patient room for vitals. Informed RN that patient removed NG tube. Telesitter did not alarm. Patient did not have any output this shift. Notified Nancy Vanessa NP, stated to leave the NG tube out for now, if patient has any nausea or vomiting, then replace the NG tube.   
This RN went into assess. Pt was found without their NG tube in at this time. The removal time is unknown. The tech was asked if the NG tube was in place when they had their vitals taken and the tech said it was. The patient had removed their NG within the time of being seen by the tech and the nurse.   
sodium bicarbonate  1,300 mg Oral BID    tamsulosin  0.4 mg Oral Daily    pantoprazole  40 mg Oral QAM AC    insulin glargine  15 Units SubCUTAneous Nightly    sodium chloride flush  10 mL IntraVENous 2 times per day    insulin lispro  0-4 Units SubCUTAneous TID WC    insulin lispro  0-4 Units SubCUTAneous Nightly     Meds prn: sodium chloride flush, sodium chloride, ondansetron **OR** ondansetron, polyethylene glycol, acetaminophen **OR** acetaminophen, glucose, dextrose bolus **OR** dextrose bolus, glucagon (rDNA), dextrose, HYDROmorphone **OR** HYDROmorphone     Lab Data :  CBC:   Recent Labs     07/26/24  0554 07/27/24  0742 07/28/24  0350   WBC 3.1* 4.0* 4.7*   HGB 8.9* 8.7* 8.9*   HCT 27.0* 26.9* 28.0*    148 144       CMP:  Recent Labs     07/26/24  0554 07/27/24  0742 07/28/24  0350    138 139   K 3.6 3.7 3.7    107 108   CO2 22* 23 20*   BUN 56* 50* 44*   CREATININE 4.9* 4.5* 4.0*   GLUCOSE 84 99 105   CALCIUM 8.0* 7.8* 7.7*   MG  --  2.0  --        Hepatic:   No results for input(s): \"LABALBU\", \"AST\", \"ALT\", \"BILITOT\", \"ALKPHOS\" in the last 72 hours.    Invalid input(s): \"ALB\"        Assessment and Plan:  JOSE on CKD IV vs progressive CKD from diabetic nephropathy  Baseline creatinine runs around 3.5.  It was 3.5 in January.  Admitted with creatinine of 5.1.  Peaked at 6.3 on 23rd July.  Today down to 4.0-- improving some.  Will continue with fluids.  No urgent indication for RRT  Avoid nephrotoxic agents  Ultrasound negative for hydronephrosis.  Patient usually follows with Dr. Richardson as outpatient  Check BMP in a.m.  Discussed with patient  Lytes: stable  Metabolic acidosis on po bicarb  Small bowel obstruction.  Improved/resolved  DM  Paroxysmal atrial fibrillation  Severe AS  Anemia of CKD: iron stores ok, retacrit 10,000 units ordered by Dr. Richardson 07/26 but refused   Bilateral renal cyst    D/W patient     Andrew Lunsford MD  [facility]    This report 
spine. The left lung   base is clear.   ASSESSMENT  1. KUB better  had BM      PLAN  1. Cl liq      Chaitanya Noble MD  Electronically signed 7/24/2024 at 10:39 AM    
contain minor errors which are inherent in voice recognition technology   
Calcium:Invalid input(s): \"IONCA\"  Magnesium:No results for input(s): \"MG\" in the last 72 hours.  Phosphorus:No results for input(s): \"PHOS\" in the last 72 hours.  BNP:No results for input(s): \"BNP\" in the last 72 hours.  Glucose:  Recent Labs     07/23/24  2117 07/24/24  0710 07/24/24  1201   POCGLU 99 116* 124*       HgbA1C: No results for input(s): \"LABA1C\" in the last 72 hours.  INR: No results for input(s): \"INR\" in the last 72 hours.  Hepatic:   Recent Labs     07/24/24  0510   ALKPHOS 93   ALT 13   AST 14   BILITOT 0.4       Amylase and Lipase:  Recent Labs     07/21/24  2227   LACTA 1.2       Lactic Acid:   Recent Labs     07/21/24  2227   LACTA 1.2       Troponin: No results for input(s): \"CKTOTAL\", \"CKMB\", \"TROPONINT\" in the last 72 hours.  BNP: No results for input(s): \"BNP\" in the last 72 hours.  Lipids: No results for input(s): \"CHOL\", \"TRIG\", \"HDL\", \"LDL\" in the last 72 hours.    Invalid input(s): \"LDLCALC\"  ABGs: No results found for: \"PH\", \"PCO2\", \"PO2\", \"HCO3\", \"O2SAT\"        Radiology reports as per the Radiologist  Radiology: XR ABDOMEN FOR NG/OG/NE TUBE PLACEMENT    Result Date: 7/22/2024  PROCEDURE: XR ABDOMEN FOR NG/OG/NE TUBE PLACEMENT CLINICAL INFORMATION: NG placement COMPARISON: 7/22/2024, 0437 hours. TECHNIQUE: A single mobile x-ray of the abdomen was obtained to assess NG tube position.     NG tube passes into stomach. Findings of moderate small bowel distention **This report has been created using voice recognition software.  It may contain minor errors which are inherent in voice recognition technology.** Electronically signed by Dr. Stalin Haynes    XR ABDOMEN FOR NG/OG/NE TUBE PLACEMENT    Result Date: 7/22/2024  ADDENDUM #1 This report was discussed with Avril Reilly RN on Jul 22, 2024 05:23:00 EDT. This document has been electronically signed by: Cecily Harmon on 07/22/2024 05:23 AMORIGINAL REPORT1 view abdomen Comparison: 01/12/2024 06:31 PM EST: CR,SR: XR ABDOMEN (KUB) (SINGLE

## 2024-07-29 NOTE — PLAN OF CARE
Problem: Discharge Planning  Goal: Discharge to home or other facility with appropriate resources  Outcome: Progressing     Problem: Safety - Adult  Goal: Free from fall injury  Outcome: Progressing     Problem: Chronic Conditions and Co-morbidities  Goal: Patient's chronic conditions and co-morbidity symptoms are monitored and maintained or improved  Outcome: Progressing     Problem: Neurosensory - Adult  Goal: Achieves stable or improved neurological status  Outcome: Progressing     Problem: Gastrointestinal - Adult  Goal: Minimal or absence of nausea and vomiting  Outcome: Progressing     Problem: Metabolic/Fluid and Electrolytes - Adult  Goal: Electrolytes maintained within normal limits  Outcome: Progressing     Problem: Pain  Goal: Verbalizes/displays adequate comfort level or baseline comfort level  Outcome: Progressing     
  Problem: Discharge Planning  Goal: Discharge to home or other facility with appropriate resources  Outcome: Progressing     Problem: Safety - Adult  Goal: Free from fall injury  Outcome: Progressing     Problem: Chronic Conditions and Co-morbidities  Goal: Patient's chronic conditions and co-morbidity symptoms are monitored and maintained or improved  Outcome: Progressing     Problem: Neurosensory - Adult  Goal: Achieves stable or improved neurological status  Outcome: Progressing     Problem: Skin/Tissue Integrity - Adult  Goal: Skin integrity remains intact  Outcome: Progressing     Problem: Metabolic/Fluid and Electrolytes - Adult  Goal: Electrolytes maintained within normal limits  Outcome: Progressing     Problem: Pain  Goal: Verbalizes/displays adequate comfort level or baseline comfort level  Outcome: Progressing     
  Problem: Discharge Planning  Goal: Discharge to home or other facility with appropriate resources  Outcome: Progressing     Problem: Safety - Adult  Goal: Free from fall injury  Outcome: Progressing     Problem: Chronic Conditions and Co-morbidities  Goal: Patient's chronic conditions and co-morbidity symptoms are monitored and maintained or improved  Outcome: Progressing     Problem: Nutrition Deficit:  Goal: Optimize nutritional status  Outcome: Progressing     Problem: Skin/Tissue Integrity - Adult  Goal: Skin integrity remains intact  Outcome: Progressing     Problem: Metabolic/Fluid and Electrolytes - Adult  Goal: Electrolytes maintained within normal limits  Outcome: Progressing     Problem: Pain  Goal: Verbalizes/displays adequate comfort level or baseline comfort level  Outcome: Progressing     Problem: Skin/Tissue Integrity  Goal: Absence of new skin breakdown  Description: 1.  Monitor for areas of redness and/or skin breakdown  2.  Assess vascular access sites hourly  3.  Every 4-6 hours minimum:  Change oxygen saturation probe site  4.  Every 4-6 hours:  If on nasal continuous positive airway pressure, respiratory therapy assess nares and determine need for appliance change or resting period.  Outcome: Progressing     
  Problem: Discharge Planning  Goal: Discharge to home or other facility with appropriate resources  Outcome: Progressing  Flowsheets (Taken 7/22/2024 2126)  Discharge to home or other facility with appropriate resources: Identify barriers to discharge with patient and caregiver  Note: Patient plans to return to [facility].      Problem: Safety - Adult  Goal: Free from fall injury  Outcome: Progressing  Flowsheets (Taken 7/22/2024 2242)  Free From Fall Injury: Instruct family/caregiver on patient safety  Note: No falls noted this shift. Continue falling star program. Bed alarm on, bed in low position. Call light and personal belongings in reach.  Patient uses call light appropriately.       Problem: Chronic Conditions and Co-morbidities  Goal: Patient's chronic conditions and co-morbidity symptoms are monitored and maintained or improved  Outcome: Progressing  Flowsheets (Taken 7/22/2024 2126)  Care Plan - Patient's Chronic Conditions and Co-Morbidity Symptoms are Monitored and Maintained or Improved: Monitor and assess patient's chronic conditions and comorbid symptoms for stability, deterioration, or improvement   Care plan reviewed with patient. Patient verbalizes understanding of plan of care and contributes to goal setting.    
  Problem: Discharge Planning  Goal: Discharge to home or other facility with appropriate resources  Outcome: Progressing  Flowsheets (Taken 7/23/2024 2145)  Discharge to home or other facility with appropriate resources:   Identify barriers to discharge with patient and caregiver   Arrange for needed discharge resources and transportation as appropriate   Identify discharge learning needs (meds, wound care, etc)     Problem: Safety - Adult  Goal: Free from fall injury  Outcome: Progressing  Flowsheets (Taken 7/23/2024 2145)  Free From Fall Injury:   Instruct family/caregiver on patient safety   Based on caregiver fall risk screen, instruct family/caregiver to ask for assistance with transferring infant if caregiver noted to have fall risk factors     Problem: Chronic Conditions and Co-morbidities  Goal: Patient's chronic conditions and co-morbidity symptoms are monitored and maintained or improved  Outcome: Progressing  Flowsheets (Taken 7/23/2024 2145)  Care Plan - Patient's Chronic Conditions and Co-Morbidity Symptoms are Monitored and Maintained or Improved:   Monitor and assess patient's chronic conditions and comorbid symptoms for stability, deterioration, or improvement   Collaborate with multidisciplinary team to address chronic and comorbid conditions and prevent exacerbation or deterioration   Update acute care plan with appropriate goals if chronic or comorbid symptoms are exacerbated and prevent overall improvement and discharge     Problem: Nutrition Deficit:  Goal: Optimize nutritional status  Outcome: Progressing  Flowsheets (Taken 7/23/2024 2145)  Nutrient intake appropriate for improving, restoring, or maintaining nutritional needs:   Assess nutritional status and recommend course of action   Monitor oral intake, labs, and treatment plans     Problem: Neurosensory - Adult  Goal: Achieves stable or improved neurological status  Outcome: Progressing  Flowsheets (Taken 7/23/2024 2145)  Achieves stable 
contributes to goal setting.

## 2024-07-31 ENCOUNTER — TELEPHONE (OUTPATIENT)
Dept: NEPHROLOGY | Age: 74
End: 2024-07-31

## 2024-07-31 VITALS
HEART RATE: 81 BPM | SYSTOLIC BLOOD PRESSURE: 179 MMHG | WEIGHT: 158.95 LBS | RESPIRATION RATE: 16 BRPM | OXYGEN SATURATION: 99 % | DIASTOLIC BLOOD PRESSURE: 90 MMHG | TEMPERATURE: 97.6 F | BODY MASS INDEX: 21.53 KG/M2 | HEIGHT: 72 IN

## 2024-07-31 NOTE — TELEPHONE ENCOUNTER
----- Message from Andrew Lunsford MD sent at 7/30/2024  7:26 PM EDT -----  Pls schedule appt with dr. Richardson post hospital follow-up 2 weeks.

## 2024-07-31 NOTE — TELEPHONE ENCOUNTER
Left message informing pt to please call the office to schedule 2 week hospital fu with Dr. Richardson.

## 2024-08-02 LAB
BASOPHILS ABSOLUTE: ABNORMAL
BASOPHILS RELATIVE PERCENT: ABNORMAL
BUN BLDV-MCNC: 36 MG/DL
CALCIUM SERPL-MCNC: 8.7 MG/DL
CHLORIDE BLD-SCNC: 109 MMOL/L
CO2: 18 MMOL/L
CREAT SERPL-MCNC: 3.9 MG/DL
EGFR: 16
EOSINOPHILS ABSOLUTE: ABNORMAL
EOSINOPHILS RELATIVE PERCENT: ABNORMAL
GLUCOSE BLD-MCNC: 164 MG/DL
HCT VFR BLD CALC: 30.3 % (ref 41–53)
HEMOGLOBIN: 9.8 G/DL (ref 13.5–17.5)
LYMPHOCYTES ABSOLUTE: ABNORMAL
LYMPHOCYTES RELATIVE PERCENT: ABNORMAL
MCH RBC QN AUTO: ABNORMAL PG
MCHC RBC AUTO-ENTMCNC: ABNORMAL G/DL
MCV RBC AUTO: ABNORMAL FL
MONOCYTES ABSOLUTE: ABNORMAL
MONOCYTES RELATIVE PERCENT: ABNORMAL
NEUTROPHILS ABSOLUTE: ABNORMAL
NEUTROPHILS RELATIVE PERCENT: ABNORMAL
PLATELET # BLD: 160 K/ΜL
PMV BLD AUTO: ABNORMAL FL
POTASSIUM SERPL-SCNC: 4.3 MMOL/L
RBC # BLD: 3.25 10^6/ΜL
SODIUM BLD-SCNC: 135 MMOL/L
WBC # BLD: 7.19 10^3/ML

## 2024-08-06 DIAGNOSIS — N18.4 CKD (CHRONIC KIDNEY DISEASE) STAGE 4, GFR 15-29 ML/MIN (HCC): Primary | ICD-10-CM

## 2024-08-13 ENCOUNTER — OFFICE VISIT (OUTPATIENT)
Dept: NEPHROLOGY | Age: 74
End: 2024-08-13
Payer: MEDICARE

## 2024-08-13 VITALS
BODY MASS INDEX: 23.86 KG/M2 | WEIGHT: 176 LBS | SYSTOLIC BLOOD PRESSURE: 136 MMHG | HEART RATE: 80 BPM | OXYGEN SATURATION: 98 % | DIASTOLIC BLOOD PRESSURE: 68 MMHG

## 2024-08-13 DIAGNOSIS — N18.4 CKD (CHRONIC KIDNEY DISEASE) STAGE 4, GFR 15-29 ML/MIN (HCC): Primary | ICD-10-CM

## 2024-08-13 DIAGNOSIS — N18.4 ANEMIA IN STAGE 4 CHRONIC KIDNEY DISEASE (HCC): ICD-10-CM

## 2024-08-13 DIAGNOSIS — D63.1 ANEMIA IN STAGE 4 CHRONIC KIDNEY DISEASE (HCC): ICD-10-CM

## 2024-08-13 PROCEDURE — 3078F DIAST BP <80 MM HG: CPT | Performed by: INTERNAL MEDICINE

## 2024-08-13 PROCEDURE — 3075F SYST BP GE 130 - 139MM HG: CPT | Performed by: INTERNAL MEDICINE

## 2024-08-13 PROCEDURE — 1123F ACP DISCUSS/DSCN MKR DOCD: CPT | Performed by: INTERNAL MEDICINE

## 2024-08-13 PROCEDURE — G2211 COMPLEX E/M VISIT ADD ON: HCPCS | Performed by: INTERNAL MEDICINE

## 2024-08-13 PROCEDURE — 99214 OFFICE O/P EST MOD 30 MIN: CPT | Performed by: INTERNAL MEDICINE

## 2024-08-13 RX ORDER — CHOLECALCIFEROL (VITAMIN D3) 1250 MCG
CAPSULE ORAL
COMMUNITY

## 2024-08-13 RX ORDER — LEVOTHYROXINE SODIUM 0.12 MG/1
125 TABLET ORAL DAILY
COMMUNITY

## 2024-08-13 RX ORDER — BUMETANIDE 1 MG/1
1 TABLET ORAL DAILY
Qty: 30 TABLET | Refills: 3 | Status: SHIPPED | OUTPATIENT
Start: 2024-08-13

## 2024-08-13 RX ORDER — NITROGLYCERIN 0.4 MG/1
0.4 TABLET SUBLINGUAL EVERY 5 MIN PRN
COMMUNITY

## 2024-08-13 NOTE — PROGRESS NOTES
[facility] [place] [facility]  [facility]  [address]  [address]  [place] [address]  Dept: [phone]  Loc: [phone]  Progress Note  8/13/2024 12:43 PM      Pt Name:    Phi Sanz  YOB: 1950  Primary Care Physician:  Kaushal Ramsay MD     Chief Complaint:   Chief Complaint   Patient presents with    Follow-up     CKD IV         History of Present Illness:   This is a hospital follow-up visit for JOSE on CKD IV.     He has a history of uncontrolled DM >20 years.  A1C has been > 14 in the past.  Also has uncontrolled hypothyroidism ( TSH was 172 in Feb), PAFib, Hx CVA, prostate CA s/p radiation, moderate As, renal cysts    Recently hospitalized for bowel obstruction. Is having worsening renal function.   Here for follow up . Accompanied by his daughter. Is residing at [place]. He is swollen. Has gained 18 pounds from his hospital discharge.   Is unsure if he wants to do dialysis.     Pertinent items are noted in HPI.         Past History:  Past Medical History:   Diagnosis Date    A-fib (HCC)     Chronic pain syndrome     CVA (cerebral vascular accident) (HCC)     Diabetes mellitus (HCC)     Heart murmur     Hyperlipidemia      Past Surgical History:   Procedure Laterality Date    CATARACT REMOVAL Bilateral     CIRCUMCISION      HEMICOLECTOMY N/A 1/2/2024    Right Colon Resection, ileocecectomy, appendectomy performed by Chaitanya Noble MD at [facility] OR    SHOULDER SURGERY      TONSILLECTOMY      UPPER GASTROINTESTINAL ENDOSCOPY          VITALS:  /68 (Site: Left Upper Arm, Position: Sitting, Cuff Size: Large Adult)   Pulse 80   Wt 79.8 kg (176 lb)   SpO2 98%   BMI 23.86 kg/m²   Wt Readings from Last 3 Encounters:   08/13/24 79.8 kg (176 lb)   07/24/24 72.1 kg (158 lb 15.2 oz)   01/14/24 77.3 kg (170 lb 6.7 oz)     Body mass index is 23.86 kg/m².     General Appearance: alert and cooperative with exam, appears comfortable, no

## 2024-08-15 ENCOUNTER — TELEPHONE (OUTPATIENT)
Dept: NEPHROLOGY | Age: 74
End: 2024-08-15

## 2024-08-15 DIAGNOSIS — M79.661 BILATERAL CALF PAIN: ICD-10-CM

## 2024-08-15 DIAGNOSIS — M79.662 BILATERAL CALF PAIN: ICD-10-CM

## 2024-08-15 DIAGNOSIS — N18.6 ESRD (END STAGE RENAL DISEASE) (HCC): Primary | ICD-10-CM

## 2024-08-15 NOTE — TELEPHONE ENCOUNTER
Pt's daughters called to inform us that pt wants to proceed with Dialysis and getting the cath in place.

## 2024-08-27 ENCOUNTER — TELEPHONE (OUTPATIENT)
Dept: NEPHROLOGY | Age: 74
End: 2024-08-27

## 2024-08-27 DIAGNOSIS — M79.661 BILATERAL CALF PAIN: Primary | ICD-10-CM

## 2024-08-27 DIAGNOSIS — M79.662 BILATERAL CALF PAIN: Primary | ICD-10-CM

## 2024-08-27 LAB
BUN BLDV-MCNC: 53 MG/DL
CALCIUM SERPL-MCNC: 8.7 MG/DL
CHLORIDE BLD-SCNC: 103 MMOL/L
CO2: 24 MMOL/L
CREAT SERPL-MCNC: 4.7 MG/DL
EGFR: 13
GLUCOSE BLD-MCNC: 364 MG/DL
POTASSIUM SERPL-SCNC: 4.5 MMOL/L
SODIUM BLD-SCNC: 133 MMOL/L

## 2024-08-27 NOTE — RESULT ENCOUNTER NOTE
Kidney function is worse.   How is his swelling?  When does he see Dr. Schwarz?  Repeat another bmp in 2 weeks

## 2024-08-27 NOTE — TELEPHONE ENCOUNTER
Kidney function is worse.   How is his swelling?   When does he see Dr. Schwarz?   Repeat another bmp in 2 week     BMP order placed

## 2024-08-27 NOTE — TELEPHONE ENCOUNTER
Will reach out to [facility] they have tried to contact  him to schedule. Need to put stat order in?   Dr. Cordon phone number [phone]  Fax [phone]

## 2024-08-27 NOTE — TELEPHONE ENCOUNTER
----- Message from Dr. Natalia Richardson, DO sent at 8/27/2024 11:38 AM EDT -----  Kidney function is worse.   How is his swelling?  When does he see Dr. Schwarz?  Repeat another bmp in 2 weeks

## 2024-09-06 NOTE — TELEPHONE ENCOUNTER
Faxed note to [facility] stating patient needs scheduled for vein mapping and appt scheduled with Dr. Cordon to [phone]

## 2024-09-06 NOTE — TELEPHONE ENCOUNTER
[facility] scheduling phoned he was scheduled for the vein mapping on 8-28-24 and the pt canceled the apt- they have been trying to reach pt to rs apt and they cannot reach patient does not answer phone.

## 2024-09-20 DIAGNOSIS — M79.662 BILATERAL CALF PAIN: ICD-10-CM

## 2024-09-20 DIAGNOSIS — N18.6 ESRD (END STAGE RENAL DISEASE) (HCC): ICD-10-CM

## 2024-09-20 DIAGNOSIS — M79.661 BILATERAL CALF PAIN: ICD-10-CM

## 2024-09-24 LAB
BASOPHILS ABSOLUTE: ABNORMAL
BASOPHILS RELATIVE PERCENT: ABNORMAL
BUN BLDV-MCNC: 56 MG/DL
CALCIUM SERPL-MCNC: 8.6 MG/DL
CHLORIDE BLD-SCNC: 109 MMOL/L
CO2: 22 MMOL/L
CREAT SERPL-MCNC: 5.3 MG/DL
EGFR: 11
EOSINOPHILS ABSOLUTE: ABNORMAL
EOSINOPHILS RELATIVE PERCENT: ABNORMAL
FERRITIN: 178 NG/ML (ref 18–300)
GLUCOSE BLD-MCNC: 72 MG/DL
HCT VFR BLD CALC: 25.4 % (ref 41–53)
HEMOGLOBIN: 8.2 G/DL (ref 13.5–17.5)
IRON % SATURATION: 21
IRON: 54
LYMPHOCYTES ABSOLUTE: ABNORMAL
LYMPHOCYTES RELATIVE PERCENT: ABNORMAL
MCH RBC QN AUTO: 31.7 PG
MCHC RBC AUTO-ENTMCNC: 32.2 G/DL
MCV RBC AUTO: 98.4 FL
MONOCYTES ABSOLUTE: ABNORMAL
MONOCYTES RELATIVE PERCENT: ABNORMAL
NEUTROPHILS ABSOLUTE: ABNORMAL
NEUTROPHILS RELATIVE PERCENT: ABNORMAL
PHOSPHORUS: 4.9 MG/DL
PLATELET # BLD: 130 K/ΜL
PMV BLD AUTO: 10.7 FL
POTASSIUM SERPL-SCNC: 4.7 MMOL/L
PTH INTACT: 143.3
RBC # BLD: 2.58 10^6/ΜL
SODIUM BLD-SCNC: 136 MMOL/L
VITAMIN D 25-HYDROXY: 31.8
VITAMIN D2, 25 HYDROXY: NORMAL
VITAMIN D3,25 HYDROXY: NORMAL
WBC # BLD: 5.41 10^3/ML

## 2024-09-30 RX ORDER — MIDODRINE HYDROCHLORIDE 5 MG/1
5 TABLET ORAL 3 TIMES DAILY
COMMUNITY
Start: 2024-09-11

## 2024-09-30 RX ORDER — CHOLESTYRAMINE 4 G/9G
1 POWDER, FOR SUSPENSION ORAL DAILY
COMMUNITY
Start: 2024-08-12

## 2024-10-03 ENCOUNTER — TELEPHONE (OUTPATIENT)
Dept: NEPHROLOGY | Age: 74
End: 2024-10-03

## 2024-10-03 NOTE — TELEPHONE ENCOUNTER
----- Message from Dr. Natalia Richardson DO sent at 10/3/2024  1:32 PM EDT -----  Patient was a no show, I need to see him soon see if can be rescheduled for Tuesday

## 2024-10-03 NOTE — TELEPHONE ENCOUNTER
Spoke to Carlyn, patient's nurse, at [facility].  Appointment rescheduled for Tuesday 10/8/2024 at 12:00pm.  Carlyn verbalized understanding and confirmed appointment and would ensure transportation for patient to attend his appt.

## 2024-10-08 ENCOUNTER — OFFICE VISIT (OUTPATIENT)
Dept: NEPHROLOGY | Age: 74
End: 2024-10-08
Payer: MEDICARE

## 2024-10-08 VITALS
BODY MASS INDEX: 24.41 KG/M2 | WEIGHT: 180 LBS | SYSTOLIC BLOOD PRESSURE: 122 MMHG | DIASTOLIC BLOOD PRESSURE: 62 MMHG | OXYGEN SATURATION: 98 % | HEART RATE: 76 BPM

## 2024-10-08 DIAGNOSIS — N18.5 CKD (CHRONIC KIDNEY DISEASE) STAGE 5, GFR LESS THAN 15 ML/MIN (HCC): Primary | ICD-10-CM

## 2024-10-08 DIAGNOSIS — N18.5 ANEMIA IN STAGE 5 CHRONIC KIDNEY DISEASE (HCC): ICD-10-CM

## 2024-10-08 DIAGNOSIS — N18.6 ESRD (END STAGE RENAL DISEASE) (HCC): ICD-10-CM

## 2024-10-08 DIAGNOSIS — D63.1 ANEMIA IN STAGE 5 CHRONIC KIDNEY DISEASE (HCC): ICD-10-CM

## 2024-10-08 PROCEDURE — 1123F ACP DISCUSS/DSCN MKR DOCD: CPT | Performed by: INTERNAL MEDICINE

## 2024-10-08 PROCEDURE — 99214 OFFICE O/P EST MOD 30 MIN: CPT | Performed by: INTERNAL MEDICINE

## 2024-10-08 PROCEDURE — 3078F DIAST BP <80 MM HG: CPT | Performed by: INTERNAL MEDICINE

## 2024-10-08 PROCEDURE — G2211 COMPLEX E/M VISIT ADD ON: HCPCS | Performed by: INTERNAL MEDICINE

## 2024-10-08 PROCEDURE — 3074F SYST BP LT 130 MM HG: CPT | Performed by: INTERNAL MEDICINE

## 2024-10-08 RX ORDER — INSULIN LISPRO 100 [IU]/ML
INJECTION, SOLUTION INTRAVENOUS; SUBCUTANEOUS
COMMUNITY
Start: 2024-08-28

## 2024-10-10 ENCOUNTER — TELEPHONE (OUTPATIENT)
Dept: NEPHROLOGY | Age: 74
End: 2024-10-10

## 2024-10-16 ENCOUNTER — HOSPITAL ENCOUNTER (OUTPATIENT)
Dept: INTERVENTIONAL RADIOLOGY/VASCULAR | Age: 74
Discharge: HOME | End: 2024-10-16
Payer: MEDICARE

## 2024-10-16 VITALS
OXYGEN SATURATION: 97 % | SYSTOLIC BLOOD PRESSURE: 164 MMHG | DIASTOLIC BLOOD PRESSURE: 71 MMHG | RESPIRATION RATE: 16 BRPM | HEART RATE: 68 BPM

## 2024-10-16 DIAGNOSIS — N18.5 CKD (CHRONIC KIDNEY DISEASE) STAGE 5, GFR LESS THAN 15 ML/MIN (HCC): ICD-10-CM

## 2024-10-16 DIAGNOSIS — N18.6 ESRD (END STAGE RENAL DISEASE) (HCC): ICD-10-CM

## 2024-10-16 LAB
DEPRECATED RDW RBC AUTO: 48.9 FL (ref 35–45)
ERYTHROCYTE [DISTWIDTH] IN BLOOD BY AUTOMATED COUNT: 13.4 % (ref 11.5–14.5)
HCT VFR BLD AUTO: 34.2 % (ref 42–52)
HGB BLD-MCNC: 11 GM/DL (ref 14–18)
MCH RBC QN AUTO: 32 PG (ref 26–33)
MCHC RBC AUTO-ENTMCNC: 32.2 GM/DL (ref 32.2–35.5)
MCV RBC AUTO: 99.4 FL (ref 80–94)
PLATELET # BLD AUTO: 134 THOU/MM3 (ref 130–400)
PMV BLD AUTO: 10.5 FL (ref 9.4–12.4)
RBC # BLD AUTO: 3.44 MILL/MM3 (ref 4.7–6.1)
WBC # BLD AUTO: 4.6 THOU/MM3 (ref 4.8–10.8)

## 2024-10-16 PROCEDURE — 2580000003 HC RX 258: Performed by: RADIOLOGY

## 2024-10-16 PROCEDURE — 77001 FLUOROGUIDE FOR VEIN DEVICE: CPT

## 2024-10-16 PROCEDURE — 6360000002 HC RX W HCPCS: Performed by: RADIOLOGY

## 2024-10-16 PROCEDURE — 76937 US GUIDE VASCULAR ACCESS: CPT

## 2024-10-16 PROCEDURE — 2500000003 HC RX 250 WO HCPCS: Performed by: RADIOLOGY

## 2024-10-16 PROCEDURE — 36558 INSERT TUNNELED CV CATH: CPT

## 2024-10-16 PROCEDURE — 85027 COMPLETE CBC AUTOMATED: CPT

## 2024-10-16 PROCEDURE — 2709999900 IR FLUORO GUIDED CVA DEVICE PLMT/REPLACE/REMOVAL

## 2024-10-16 RX ORDER — LIDOCAINE HYDROCHLORIDE 20 MG/ML
INJECTION, SOLUTION EPIDURAL; INFILTRATION; INTRACAUDAL; PERINEURAL PRN
Status: COMPLETED | OUTPATIENT
Start: 2024-10-16 | End: 2024-10-16

## 2024-10-16 RX ORDER — MIDAZOLAM HYDROCHLORIDE 1 MG/ML
INJECTION INTRAMUSCULAR; INTRAVENOUS PRN
Status: COMPLETED | OUTPATIENT
Start: 2024-10-16 | End: 2024-10-16

## 2024-10-16 RX ORDER — SODIUM CHLORIDE 450 MG/100ML
INJECTION, SOLUTION INTRAVENOUS CONTINUOUS
Status: DISCONTINUED | OUTPATIENT
Start: 2024-10-16 | End: 2024-10-17 | Stop reason: HOSPADM

## 2024-10-16 RX ORDER — MIDAZOLAM HYDROCHLORIDE 1 MG/ML
1 INJECTION INTRAMUSCULAR; INTRAVENOUS ONCE
Status: DISCONTINUED | OUTPATIENT
Start: 2024-10-16 | End: 2024-10-17 | Stop reason: HOSPADM

## 2024-10-16 RX ADMIN — LIDOCAINE HYDROCHLORIDE 13 ML: 20 INJECTION, SOLUTION EPIDURAL; INFILTRATION; INTRACAUDAL; PERINEURAL at 11:33

## 2024-10-16 RX ADMIN — MIDAZOLAM 0.5 MG: 1 INJECTION INTRAMUSCULAR; INTRAVENOUS at 11:14

## 2024-10-16 RX ADMIN — HYDROMORPHONE HYDROCHLORIDE 0.5 MG: 1 INJECTION, SOLUTION INTRAMUSCULAR; INTRAVENOUS; SUBCUTANEOUS at 11:14

## 2024-10-16 RX ADMIN — CEFAZOLIN 2000 MG: 2 INJECTION, POWDER, FOR SOLUTION INTRAVENOUS at 09:44

## 2024-10-16 RX ADMIN — SODIUM CHLORIDE: 4.5 INJECTION, SOLUTION INTRAVENOUS at 09:45

## 2024-10-16 ASSESSMENT — PAIN SCALES - GENERAL
PAINLEVEL_OUTOF10: 0

## 2024-10-16 NOTE — H&P
[facility]  Sedation/Analgesia History & Physical    Pt Name: Phi Sanz  MRN: [id number]  YOB: 1950  Provider Performing Procedure: Stalin Haynes MD, MD  Primary Care Physician: Kaushal Ramsay MD    Formulation and discussion of sedation / procedure plans, risks, benefits, side effects and alternatives with patient and/or responsible adult completed.    PRE-PROCEDURE   DNR-CCA/DNR-CC []Yes [x]No  Brief History/Pre-Procedure Diagnosis: Renal failure           MEDICAL HISTORY  []CAD/Valve  []Liver Disease  []Lung Disease []Diabetes  []Hypertension []Renal Disease  []Additional information:       has a past medical history of A-fib (HCC), Chronic pain syndrome, CVA (cerebral vascular accident) (HCC), Diabetes mellitus (HCC), Heart murmur, and Hyperlipidemia.    SURGICAL HISTORY   has a past surgical history that includes Cataract removal (Bilateral); Tonsillectomy; Circumcision; Upper gastrointestinal endoscopy; shoulder surgery; and hemicolectomy (N/A, 1/2/2024).  Additional information:       ALLERGIES   Allergies as of 10/16/2024    (No Known Allergies)     Additional information:       MEDICATIONS   Coumadin Use Last 5 Days [x]No []Yes  Antiplatelet drug therapy use last 5 days  [x]No []Yes  Other anticoagulant use last 5 days  [x]No []Yes    Current Outpatient Medications:     bumetanide (BUMEX) 1 MG tablet, Take 1 tablet by mouth daily, Disp: 30 tablet, Rfl: 3    amLODIPine (NORVASC) 5 MG tablet, Take 1 tablet by mouth daily, Disp: 30 tablet, Rfl: 0    gabapentin (NEURONTIN) 400 MG capsule, Take 2 capsules by mouth in the morning and at bedtime., Disp: , Rfl:     sodium bicarbonate 650 MG tablet, Take 2 tablets by mouth 2 times daily, Disp: , Rfl:     busPIRone (BUSPAR) 10 MG tablet, Take 1 tablet by mouth in the morning and at bedtime, Disp: , Rfl:     amiodarone (PACERONE) 100 MG tablet, Take 1 tablet by mouth daily, Disp: , Rfl:     apixaban (ELIQUIS) 5 MG TABS

## 2024-10-16 NOTE — PROGRESS NOTES
0915  Phi was wheeled into room via personal wheelchair for tunneled dialysis catheter insertion. Pt rights and responsibilities offered to Phi. Procedure explained and questions were answered. Phi has call light within reach, iv started and atb given. Phi has no other needs at this time. I wheeled him into restroom via wheelchair to use restroom and he tolerated well. He does good pivoting from wheelchair to stretcher. Phi has been NPO for over 4 hours.   Eliquis has been held for 5 days     1050  Phi was picked up for procedure.     1145 Phi returned from procedure. See ANDREW Pearson note    1200  dressings clean dry intact. Site soft. Family at bedside. Phi was brought coffee and water.     1215 dressings clean dry intact, site soft. No pain, tolerating drink. Family at bedside, ice pack on dressing, call light within reach.     1230  Phi has call light within reach. Daughter Carine left. I called transport and said Phi will be ready for  at 1300. Phi dressings unchanged, call light within reach.     1300  dressing clean dry intact, ice pack on dressing and being sent with Phi. Phi has no other needs today.  D/c instructions discussed with Phi and Carine and they verbalized understanding. Phi was wheeled out via wheelchair for d/c today with [facility] transport.   ---I called report to Camila ABURTO at [facility] and she verbalized understanding.       _m___ Safety:       (Environmental)  [place] to environment  Ensure ID band is correct and in place/ allergy band as needed  Assess for fall risk  Initiate fall precautions as applicable (fall band, side rails, etc.)  Call light within reach  Bed in low position/ wheels locked    m____ Pain:       Assess pain level and characteristics  Administer analgesics as ordered  Assess effectiveness of pain management and report to MD as needed    _m___ Knowledge Deficit:  Assess baseline knowledge  Provide teaching at level of

## 2024-10-16 NOTE — H&P
Formulation and discussion of sedation / procedure plans, risks, benefits, side effects and alternatives with patient and/or responsible adult completed.    History and Physical reviewed and unchanged.    Electronically signed by Stalin Haynes MD on 10/16/24 at 10:56 AM EDT

## 2024-10-16 NOTE — DISCHARGE INSTRUCTIONS
DISCHARGE INSTRUCTION SHEET    Diet: As before  Care of catheter site:  Keep dressing clean and dry  Ice to catheter site for next 4 hours (20 minutes every hour)  Limit activity to surgical site (no pushing, pulling, or lifting) for next 2 days  Sponge bath only for next 5 days - then may shower, remove dressing and leave open to air.  Steri strips will fall off on their own - do not remove  No driving today    Return to nearest Emergency Room if any of the following:  Symptoms of bleeding, drainage, swelling  Increase in pain  Elevated temperature over 101 degrees    If you have any problems call your doctor or return to the nearest Emergency Room.               SEDATION/ANALGESIA INFORMATION HOME GOING ADVICE    Review the following information with the patient prior to the procedure.  Sedation/agalgesia is used during short medical procedures under controlled supervision.  The medication will produce a strong relaxation.  You will be able to hear, speak and follow instructions, but you memory and alertness will be decreased.  You will be able to swallow and breathe on your own.  During sedation/analgesia you blood pressure, hear and breathing will be watched closely.  After the procedure, you may not remember what was said or done.    Procedure:      Date:  You may have the following effects from the medication.  Drowsiness, dizziness, sleepiness or confusion.  Difficulty remembering or delayed reaction times.  Loss of fine muscle control or difficulty with your balance especially while walking.  Difficulty focusing or blurred vision.  You may not be aware of slight changes in your behavior and/or your reaction time because of the medication used during the procedure.  Therefore you should follow these instructions.  Have someone responsible help you with your care.  Do not drive for 24 hours.  Do not operate equipment for 24 hours (lawnmowers, power tools, kitchen accessories, stove).  Do not drink any

## 2024-10-16 NOTE — PROGRESS NOTES
1145 patient returned to OPN.  Dressing is clean, dry, intact.  Ice pack over chest    He denies complaints.

## 2024-10-16 NOTE — PROGRESS NOTES
1055 Patient received in IR for tunneled dialysis catheter placement. Family taken to main radiology.   1059 This procedure has been fully reviewed with the patient and written informed consent has been obtained.  1116 Procedure started with Dr. Haynes.   1128 Family called to consult room.   1130 Procedure completed; patient tolerated well. Dressing to right chest; no bleeding noted.  1133 Dr Haynes speaking with patient's family.   1138 Patient on cart; comfort ensured.  1140 Patient taken to OPN via transport. Pt alert and oriented x3; follows commands. Skin pink, warm, and dry. Respirations easy, regular, and nonlabored. Report called to Carolina ABURTO on OPN.

## 2024-10-16 NOTE — OP NOTE
Department of Radiology  Post Procedure Progress Note      Pre-Procedure Diagnosis:  Renal Failure    Procedure Performed: Dialysis Catheter insertion     Anesthesia: local , versed and dilaudid    Findings: successful    Immediate Complications:  None    Estimated Blood Loss: minimal    SEE DICTATED PROCEDURE NOTE FOR COMPLETE DETAILS.      Stalin Haynes MD   10/16/2024 11:31 AM

## 2024-10-21 LAB
ANTIBODY: 0.6
ANTIBODY: 0.6
ANTIBODY: NEGATIVE

## 2025-01-26 NOTE — H&P
(LIPITOR) 10 MG tablet 7/21/2024  No No   Sig: Take 1 tablet by mouth at bedtime   busPIRone (BUSPAR) 10 MG tablet 7/21/2024  No No   Sig: Take 1 tablet by mouth in the morning and at bedtime   epoetin marbella-epbx (RETACRIT) 05958 UNIT/ML SOLN injection   No No   Sig: Inject 1 mL into the skin Once a week at 5 PM   ferrous sulfate (IRON 325) 325 (65 Fe) MG tablet 7/21/2024  Yes Yes   Sig: Take 1 tablet by mouth daily (with breakfast)   gabapentin (NEURONTIN) 400 MG capsule 7/21/2024  Yes Yes   Sig: Take 2 capsules by mouth in the morning and at bedtime.   gabapentin 50 MG TABS   No No   Sig: Take 300 mg by mouth at bedtime for 30 days.   insulin glargine (LANTUS) 100 UNIT/ML injection vial   No No   Sig: Inject 20 Units into the skin nightly   levothyroxine (SYNTHROID) 50 MCG tablet 7/21/2024  No No   Sig: Take 1 tablet by mouth Daily   Patient taking differently: Take 2.5 tablets by mouth Daily   loperamide (IMODIUM) 2 MG capsule Unknown  Yes Yes   Sig: Take 1 capsule by mouth every 6 hours as needed for Diarrhea   lovastatin (MEVACOR) 40 MG tablet 7/20/2024  Yes No   Sig: Take 1 tablet by mouth nightly   magnesium oxide (MAG-OX) 400 (240 Mg) MG tablet 7/21/2024  No No   Sig: Take 1 tablet by mouth 2 times daily   pantoprazole (PROTONIX) 40 MG tablet 7/21/2024  No No   Sig: Take 1 tablet by mouth every morning (before breakfast)   polycarbophil (FIBERCON) 625 MG tablet 7/21/2024  No No   Sig: Take 1 tablet by mouth daily   sodium bicarbonate 650 MG tablet 7/21/2024  No No   Sig: Take 2 tablets by mouth 2 times daily   tamsulosin (FLOMAX) 0.4 MG capsule 7/21/2024  No No   Sig: Take 1 capsule by mouth daily   vitamin D (CHOLECALCIFEROL) 25 MCG (1000 UT) TABS tablet 7/21/2024  Yes Yes   Sig: Take 2 tablets by mouth daily      Facility-Administered Medications: None     Allergies:  Patient has no known allergies.    Past Medical, Family, Social, Surgical Hx      Diagnosis Date    A-fib ([facility])     Chronic pain syndrome      supportive care  fall precautions

## (undated) DEVICE — BLANKET THER AD W24XL60IN FAB COVERING SUP SFT ULT THN LTWT

## (undated) DEVICE — SUTURE VCRL SZ 3-0 L18IN ABSRB VLT L26MM SH 1/2 CIR J774D

## (undated) DEVICE — SUTURE VCRL + SZ 0 L18IN ABSRB TIE VCP106G

## (undated) DEVICE — SYRINGE MED 50ML LUERLOCK TIP

## (undated) DEVICE — RELOAD STPL L75MM OPN H3.8MM CLS 1.5MM WIRE DIA0.2MM REG

## (undated) DEVICE — STAPLER INT 75MM CUT LN L73MM STPL LN L77MM LNAR B-FORM

## (undated) DEVICE — GLOVE SURG SZ 7.5 L11.73IN FNGR THK9.8MIL STRW LTX POLYMER

## (undated) DEVICE — STAPLER INT L60MM REG TISS BLU B FRM 8 FIRING 2 ROW AUTO

## (undated) DEVICE — APPLIER CLP L L13IN TI MULT RNG HNDL 20 CLP STR LIGACLP

## (undated) DEVICE — SUTURE VCRL SZ 1 L18IN ABSRB VLT CTX L48MM 1/2 CIR J765D

## (undated) DEVICE — SPONGE GZ W4XL4IN COT 12 PLY TYP VII WVN C FLD DSGN STERILE

## (undated) DEVICE — TOWEL,OR,DSP,ST,WHITE,DLX,XR,4/PK,20PK/C: Brand: MEDLINE

## (undated) DEVICE — PENCIL SMK EVAC ALL IN 1 DSGN ENH VISIBILITY IMPROVED AIR

## (undated) DEVICE — BREAST HERNIA: Brand: MEDLINE INDUSTRIES, INC.

## (undated) DEVICE — CLEAN CLOSURE PACK: Brand: MEDLINE INDUSTRIES, INC.